# Patient Record
Sex: MALE | Race: WHITE | NOT HISPANIC OR LATINO | Employment: UNEMPLOYED | URBAN - METROPOLITAN AREA
[De-identification: names, ages, dates, MRNs, and addresses within clinical notes are randomized per-mention and may not be internally consistent; named-entity substitution may affect disease eponyms.]

---

## 2017-01-11 ENCOUNTER — OCCUPATIONAL MEDICINE (OUTPATIENT)
Dept: URGENT CARE | Facility: CLINIC | Age: 27
End: 2017-01-11
Payer: COMMERCIAL

## 2017-01-11 ENCOUNTER — APPOINTMENT (OUTPATIENT)
Dept: RADIOLOGY | Facility: IMAGING CENTER | Age: 27
End: 2017-01-11
Attending: FAMILY MEDICINE
Payer: COMMERCIAL

## 2017-01-11 VITALS
RESPIRATION RATE: 16 BRPM | TEMPERATURE: 97.5 F | SYSTOLIC BLOOD PRESSURE: 120 MMHG | DIASTOLIC BLOOD PRESSURE: 80 MMHG | HEART RATE: 82 BPM | OXYGEN SATURATION: 97 %

## 2017-01-11 DIAGNOSIS — S50.01XA CONTUSION OF RIGHT ELBOW, INITIAL ENCOUNTER: ICD-10-CM

## 2017-01-11 DIAGNOSIS — S63.501A WRIST SPRAIN, RIGHT, INITIAL ENCOUNTER: ICD-10-CM

## 2017-01-11 DIAGNOSIS — S93.401A SPRAIN OF RIGHT ANKLE, UNSPECIFIED LIGAMENT, INITIAL ENCOUNTER: ICD-10-CM
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
## 2017-01-11 LAB
AMP AMPHETAMINE: NORMAL
BREATH ALCOHOL COMMENT: NORMAL
COC COCAINE: NORMAL
INT CON NEG: NORMAL
INT CON POS: NORMAL
MET METHAMPHETAMINES: NORMAL
OPI OPIATES: NORMAL
PCP PHENCYCLIDINE: NORMAL
POC BREATHALIZER: 0 PERCENT (ref 0–0.01)
POC DRUG COMMENT 753798-OCCUPATIONAL HEALTH: NEGATIVE
THC: NORMAL

## 2017-01-11 PROCEDURE — 99214 OFFICE O/P EST MOD 30 MIN: CPT | Performed by: FAMILY MEDICINE

## 2017-01-11 PROCEDURE — 80305 DRUG TEST PRSMV DIR OPT OBS: CPT | Performed by: FAMILY MEDICINE

## 2017-01-11 PROCEDURE — 73080 X-RAY EXAM OF ELBOW: CPT | Mod: TC,RT | Performed by: FAMILY MEDICINE

## 2017-01-11 PROCEDURE — 82075 ASSAY OF BREATH ETHANOL: CPT | Performed by: FAMILY MEDICINE

## 2017-01-11 PROCEDURE — 73110 X-RAY EXAM OF WRIST: CPT | Mod: TC,RT | Performed by: FAMILY MEDICINE

## 2017-01-11 NOTE — Clinical Note
[address] [place] [address]  Phone: [phone] - Fax: [phone]        [facility] Progress Report and Disability Certification  Date of Service: 1/11/2017   No Show:  No  Date / Time of Next Visit: 1/13/17 @ 2:30   Claim Information   Patient Name: Eddie Daniel  Claim Number:     Employer:   [employer] Date of Injury: 1/11/2017     Insurer / TPA: Lars Cota  ID / SSN:     Occupation: Parts  Diagnosis: Diagnoses of Wrist sprain, right, initial encounter, Contusion of right elbow, initial encounter, and Sprain of right ankle, unspecified ligament, initial encounter were pertinent to this visit.    Medical Information   Related to Industrial Injury? Yes    Subjective Complaints:    DOI:   1/11    Fall   The fall occurred while getting out of his truck - slipped and fell.   . Pt fell from a height of 3 to 5 ft. Pt landed on hard ground. There was no blood loss. Point of impact:  Right outstretched hand, rt elbow and he rolled   his rt ankle.   Pain 4/10, constant, throbbing, worse to the touch over rt elbow, wrist and ankle.   Objective Findings: Musculoskeletal:   Right ankle - tender over lateral malleolus.   No bruising, minimal swelling.  Full ROM  Right wrist - tender over scaphoid.   Full ROM, no edema.  Neurovascularly intact  Right elbow - tender over olecranon process.   No bruising,   abrasions, or edema.    Pre-Existing Condition(s):     Assessment:   Initial Visit    Status: Additional Care Required    Permanent Disability:No    Plan: Medication    Diagnostics: X-ray  Comments:no fracture    Comments:       Disability Information   Status: Released to Restricted Duty    From:  1/11/2017  Through: 1/14/2017 Restrictions are: Temporary   Physical Restrictions   Sitting:    Standing:    Stooping:    Bending:  < or = to 2 hrs/day   Squatting:    Walking:  < or = to 2 hrs/day Climbing:    Pushing:      Pulling:   Other:    Reaching Above Shoulder (L):   Reaching Above Shoulder (R):       Reaching Below Shoulder (L):    Reaching Below Shoulder (R):      Not to exceed Weight Limits   Carrying(hrs):   Weight Limit(lb): < or = to 10 pounds Lifting(hrs):   Weight  Limit(lb): < or = to 10 pounds   Comments: 1. Wrist sprain, right, initial encounter  2. Contusion of right elbow, initial encounter  3. Sprain of right ankle, unspecified ligament, initial encounter       RICE protocol  xrays personally reviewed - no fracture.     Work restrictions per D39    F/u 3-5 d    Repetitive Actions   Hands: i.e. Fine Manipulations from Grasping:     Feet: i.e. Operating Foot Controls:     Driving / Operate Machinery:     Physician Name: Jake Diana Physician Signature: JAKE Thomas M.D. e-Signature: Dr. Brannon Strong, Medical Director   Clinic Name / Location: [address] [place] [address] Clinic Phone Number: Dept: [phone]   Appointment Time: 11:15 Am Visit Start Time:    Check-In Time:  11:29 Am Visit Discharge Time:  1:06pm   Original-Treating Physician or Chiropractor    Page 2-Insurer/TPA    Page 3-Employer    Page 4-Employee

## 2017-01-11 NOTE — MR AVS SNAPSHOT
Eddie Zhu Fredy   2017 11:15 AM   Occupational Medicine   MRN: [id number]    Department:  [facility]   Dept Phone:  [phone]    Description:  Male : 1990   Provider:  Jake Diana M.D.           Reason for Visit     Fall NEW WC pt fell getting out of truck, Righ ankle, right knee, right elbow, right wrist and right side of head, pt is dizzy       Allergies as of 2017     Allergen Noted Reactions    Hydrocodone-Acetaminophen 2014   Rash, Vomiting    Rash on neck and nausea/vomiting.      You were diagnosed with     Wrist sprain, right, initial encounter   [673555]       Contusion of right elbow, initial encounter   [509188]       Sprain of right ankle, unspecified ligament, initial encounter   [2245535]         Vital Signs     Blood Pressure Pulse Temperature Respirations Oxygen Saturation Smoking Status    120/80 mmHg 82 36.4 °C (97.5 °F) 16 97% Never Smoker       Basic Information     Date Of Birth Sex Race Ethnicity Preferred Language    1990 Male White Non- English      Your appointments     2017  2:30 PM   Workers Compensation with Arsenio Lebron D.O.   [facility] [address] [address] [phone]              Problem List              ICD-10-CM Priority Class Noted - Resolved    Attention to ileostomy ([facility]) Z43.2   4/3/2012 - Present    Enteritis K52.9   2012 - Present    Dehydration E86.0   2014 - Present    SBO (small bowel obstruction) ([facility]) K56.69   2014 - Present    Abdominal pain R10.9   2014 - Present    Internal derangement of knee M23.90   2014 - Present      Health Maintenance        Date Due Completion Dates    IMM DTaP/Tdap/Td Vaccine (1 - Tdap) 2007 ---    IMM INFLUENZA (1) 2016 ---            Results     POCT Breath Alcohol Test      Component Value Standard Range & Units    POC Breathalizer 0.000 0.00 - 0.01 Percent    Breath Alcohol  Comment                  POCT 6 Panel Urine Drug Screen      Component    AMPHETAMINE    POC THC    COCAINE    OPIATES    PHENCYCLIDINE    METHAMPHETAMINES    POC Urine Drug Screen Comment    Negative    Internal Control Positive    Valid    Internal Control Negative    Valid                        Current Immunizations     No immunizations on file.      Below and/or attached are the medications your provider expects you to take. Review all of your home medications and newly ordered medications with your provider and/or pharmacist. Follow medication instructions as directed by your provider and/or pharmacist. Please keep your medication list with you and share with your provider. Update the information when medications are discontinued, doses are changed, or new medications (including over-the-counter products) are added; and carry medication information at all times in the event of emergency situations     Allergies:  HYDROCODONE-ACETAMINOPHEN - Rash,Vomiting               Medications  Valid as of: January 11, 2017 -  1:22 PM    Generic Name Brand Name Tablet Size Instructions for use    Acetaminophen (Tab) TYLENOL 325 MG Take 650 mg by mouth every four hours as needed.        Ibuprofen (Tab) MOTRIN 600 MG Take 1 Tab by mouth every 6 hours as needed.        Oxycodone-Acetaminophen (Tab) PERCOCET 5-325 MG Take 1 Tab by mouth every four hours as needed.        Oxycodone-Acetaminophen (Tab) PERCOCET 5-325 MG Take 1-2 Tabs by mouth every 6 hours as needed (pain).        Oxycodone-Acetaminophen (Tab) PERCOCET 5-325 MG Take 1-2 Tabs by mouth every 6 hours as needed (pain).        .                 Medicines prescribed today were sent to:     [facility] #[id number] - [place] - [address] [place] [address]    Phone: [phone] Fax: [phone]    Open 24 Hours?: No      Medication refill instructions:       If your prescription bottle indicates you have medication refills left, it is not necessary to call  your provider’s office. Please contact your pharmacy and they will refill your medication.    If your prescription bottle indicates you do not have any refills left, you may request refills at any time through one of the following ways: The online V[employer] system (except Urgent Care), by calling your provider’s office, or by asking your pharmacy to contact your provider’s office with a refill request. Medication refills are processed only during regular business hours and may not be available until the next business day. Your provider may request additional information or to have a follow-up visit with you prior to refilling your medication.   *Please Note: Medication refills are assigned a new Rx number when refilled electronically. Your pharmacy may indicate that no refills were authorized even though a new prescription for the same medication is available at the pharmacy. Please request the medicine by name with the pharmacy before contacting your provider for a refill.        Your To Do List     Future Labs/Procedures Complete By Expires    DX-ELBOW-COMPLETE 3+ RIGHT  As directed 1/11/2018    DX-WRIST-COMPLETE 3+ RIGHT  As directed 7/11/2017         [employer] Access Code: [email or web address]  Expires: 1/25/2017  8:48 AM    [employer]  A secure, online tool to manage your health information     [employer] Vita Sound® is a secure, online tool that connects you to your personalized health information from the privacy of your home -- day or night - making it very easy for you to manage your healthcare. Once the activation process is completed, you can even access your medical information using the V[employer] Sound ivy, which is available for free in the Apple Ivy store or Google Play store.     [employer] provides the following levels of access (as shown below):   My Chart Features   [facility] Doctor [facility]  Specialists [facility]  Urgent  Care Non-[facility]  Primary Care  Doctor   Email your healthcare team securely and privately  24/7 X X X    Manage appointments: schedule your next appointment; view details of past/upcoming appointments X      Request prescription refills. X      View recent personal medical records, including lab and immunizations X X X X   View health record, including health history, allergies, medications X X X X   Read reports about your outpatient visits, procedures, consult and ER notes X X X X   See your discharge summary, which is a recap of your hospital and/or ER visit that includes your diagnosis, lab results, and care plan. X X       How to register for [facility]:  1. Go to  [email or web address].  2. Click on the Sign Up Now box, which takes you to the New Member Sign Up page. You will need to provide the following information:  a. Enter your [facility] Access Code exactly as it appears at the top of this page. (You will not need to use this code after you’ve completed the sign-up process. If you do not sign up before the expiration date, you must request a new code.)   b. Enter your date of birth.   c. Enter your home email address.   d. Click Submit, and follow the next screen’s instructions.  3. Create a [facility] ID. This will be your [facility] login ID and cannot be changed, so think of one that is secure and easy to remember.  4. Create a [facility] password. You can change your password at any time.  5. Enter your Password Reset Question and Answer. This can be used at a later time if you forget your password.   6. Enter your e-mail address. This allows you to receive e-mail notifications when new information is available in [facility].  7. Click Sign Up. You can now view your health information.    For assistance activating your [facility] account, call [phone]

## 2017-01-11 NOTE — Clinical Note
"EMPLOYEE’S CLAIM FOR COMPENSATION/ REPORT OF INITIAL TREATMENT  FORM C-4    EMPLOYEE’S CLAIM - PROVIDE ALL INFORMATION REQUESTED   First Name  Eddie Last Name  Chick Birthdate                    1990                Sex  male Claim Number   Home Address  [address] [place] [address] Age  26 y.o. 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 (if applicable)  Getting out my truck    How did this injury or occupational disease occur? (Be specific an answer in detail. Use additional sheet if necessary)  I was getting out my truck rolled my ankle and slipped on Ice, Falling on my right knee,   wrist, elbow, my head.   If you believe that you have an occupational disease, when did you first have knowledge of the disability and it relationship to your employment?  na Witnesses to the Accident  na      Nature of Injury or Occupational Disease  Sprain  Part(s) of Body Injured or Affected  Knee (R), Wrist (R) and Hand (R), Skull    I certify that the above is true and correct to the best of my knowledge and that I have provided this information in order to obtain the " benefits of [place]  I hereby authorize any physician, chiropractor, surgeon, practitioner, or other person, any hospital, including [facility] or [facility], any medical service organization, any insurance company, or other institution or organization to release to each other, any medical or other information, including benefits paid or payable, pertinent to this injury or disease, except information relative to diagnosis, treatment and/or counseling for AIDS, psychological conditions, alcohol or controlled substances, for which I must give specific authorization.  A Photostat of this authorization shall be as valid as the original.     Date   Place   Employee’s Signature   THIS REPORT MUST BE COMPLETED AND MAILED WITHIN 3 WORKING DAYS OF TREATMENT   Place  [facility]  Name of [facility]   Date  1/11/2017 Diagnosis  (S63.501A) Wrist sprain, right, initial encounter  (S50.01XA) Contusion of right elbow, initial encounter  (S93.401A) Sprain of right ankle, unspecified ligament, initial encounter Is there evidence the injured employee was under the influence of alcohol and/or another controlled substance at the time of accident?   Hour   Description of Injury or Disease  Diagnoses of Wrist sprain, right, initial encounter, Contusion of right elbow, initial encounter, and Sprain of right ankle, unspecified ligament, initial encounter were pertinent to this visit. No   Treatment  1. Wrist sprain, right, initial encounter  2. Contusion of right elbow, initial encounter  3. Sprain of right ankle, unspecified ligament, initial encounter       RICE protocol  xrays personally reviewed - no fracture.     Work restrictions per D39    F/u 3-5 d  Have you advised the patient to remain off work five days or more? No   X-Ray Findings  Negative   If Yes   From Date  To Date      From  "information given by the employee, together with medical evidence, can you directly connect this injury or occupational disease as job incurred?  Yes If No Full Duty  No Modified Duty  Yes   Is additional medical care by a physician indicated?  Yes If Modified Duty, Specify any Limitations / Restrictions  Restrictions per D39   Do you know of any previous injury or disease contributing to this condition or occupational disease?                            No   Date  1/11/2017 Print Doctor’s Name Jake Diana M.D. I certify the employer’s copy of  this form was mailed on:   Address  [address]s [address] Only     [place] Zip  [address]    Provider’s Tax ID Number  [id number]  Telephone  Dept: [phone]        e-JAKE Schrader M.D.   e-Signature: Dr. Brannon Strong, Medical Director Degree  MD        ORIGINAL-TREATING PHYSICIAN OR CHIROPRACTOR    PAGE 2-INSURER/TPA    PAGE 3-EMPLOYER    PAGE 4-EMPLOYEE             Form C-4 (rev10/07)              BRIEF DESCRIPTION OF RIGHTS AND BENEFITS  (Pursuant to [place] 616C.050)    Notice of Injury or Occupational Disease (Incident Report Form C-1): If an injury or occupational disease (OD) arises out of and in the  course of employment, you must provide written notice to your employer as soon as practicable, but no later than 7 days after the accident or  OD. Your employer shall maintain a sufficient supply of the required forms.    Claim for Compensation (Form C-4): If medical treatment is sought, the form C-4 is available at the place of initial treatment. A completed  \"Claim for Compensation\" (Form C-4) must be filed within 90 days after an accident or OD. The treating physician or chiropractor must,  within 3 working days after treatment, complete and mail to the employer, the employer's insurer and third-party , the Claim for  Compensation.    Medical Treatment: If you require medical treatment for your on-the-job injury " or OD, you may be required to select a physician or  chiropractor from a list provided by your workers’ compensation insurer, if it has contracted with an Organization for Managed Care (MCO) or  Preferred Provider Organization (PPO) or providers of health care. If your employer has not entered into a contract with an MCO or PPO, you  may select a physician or chiropractor from the Panel of Physicians and Chiropractors. Any medical costs related to your industrial injury or  OD will be paid by your insurer.    Temporary Total Disability (TTD): If your doctor has certified that you are unable to work for a period of at least 5 consecutive days, or 5  cumulative days in a 20-day period, or places restrictions on you that your employer does not accommodate, you may be entitled to TTD  compensation.    Temporary Partial Disability (TPD): If the wage you receive upon reemployment is less than the compensation for TTD to which you are  entitled, the insurer may be required to pay you TPD compensation to make up the difference. TPD can only be paid for a maximum of 24  months.    Permanent Partial Disability (PPD): When your medical condition is stable and there is an indication of a PPD as a result of your injury or  OD, within 30 days, your insurer must arrange for an evaluation by a rating physician or chiropractor to determine the degree of your PPD. The  amount of your PPD award depends on the date of injury, the results of the PPD evaluation and your age and wage.    Permanent Total Disability (PTD): If you are medically certified by a treating physician or chiropractor as permanently and totally disabled  and have been granted a PTD status by your insurer, you are entitled to receive monthly benefits not to exceed 66 2/3% of your average  monthly wage. The amount of your PTD payments is subject to reduction if you previously received a PPD award.    Vocational Rehabilitation Services: You may be eligible for  vocational rehabilitation services if you are unable to return to the job due to a  permanent physical impairment or permanent restrictions as a result of your injury or occupational disease.    Transportation and Per Johan Reimbursement: You may be eligible for travel expenses and per johan associated with medical treatment.    Reopening: You may be able to reopen your claim if your condition worsens after claim closure.    Appeal Process: If you disagree with a written determination issued by the insurer or the insurer does not respond to your request, you may  appeal to the Department of Administration, , by following the instructions contained in your determination letter. You must  appeal the determination within 70 days from the date of the determination letter at [address], [address], [place], [place]  [address], or [address], [address], [place], [place] [address]. If you disagree with the  decision, you may appeal to the  Department of Administration, . You must file your appeal within 30 days from the date of the  decision  letter at [address], [address], [place], [place] [address], or [address], [address], [place], [place] [address]. If you  disagree with a decision of an , you may file a petition for judicial review with the District Court. You must do so within 30  days of the Appeal Officer’s decision. You may be represented by an  at your own expense or you may contact the [employer] for possible  representation.    [place]  for Injured Workers (NAIW): If you disagree with a  decision, you may request that NAIW represent you  without charge at an  Hearing. For information regarding denial of benefits, you may contact the [employer] at: [address], [address], [place] [address], [phone], or [address], [place]  [address], [phone]    To File a Complaint with the Division: If you wish to file a complaint with the  of the Division of Industrial Relations (DIR),  please contact the Workers’ Compensation Section, [address], [address], [place], [place] [address], telephone (910) [phone], or  [address], [address], [place], [place] [address], telephone (821) [phone].    For assistance with Workers’ Compensation Issues: you may contact the Office of the Governor Consumer Health Assistance, [address] [place] [address], [address], [place], [place] [address], Toll Free 1-528.774.6774, Web site: http://Needishcha.[facility].[email or web address].us, E-mail  [email or web address].                                                                                                                                                                                                                                   __________________________________________________________________                                                                   _________________                Employee Name / Signature                                                                                                                                                       Date                                                                                                                                                                                                     D-2 (rev. 10/07)

## 2017-01-13 ENCOUNTER — OCCUPATIONAL MEDICINE (OUTPATIENT)
Dept: OCCUPATIONAL MEDICINE | Facility: CLINIC | Age: 27
End: 2017-01-13
Payer: COMMERCIAL

## 2017-01-13 VITALS
RESPIRATION RATE: 16 BRPM | DIASTOLIC BLOOD PRESSURE: 82 MMHG | OXYGEN SATURATION: 94 % | HEART RATE: 90 BPM | HEIGHT: 69 IN | TEMPERATURE: 98.5 F | SYSTOLIC BLOOD PRESSURE: 118 MMHG

## 2017-01-13 DIAGNOSIS — S63.501D RIGHT WRIST SPRAIN, SUBSEQUENT ENCOUNTER: ICD-10-CM

## 2017-01-13 DIAGNOSIS — S50.01XD CONTUSION OF RIGHT ELBOW, SUBSEQUENT ENCOUNTER: ICD-10-CM

## 2017-01-13 PROCEDURE — 99213 OFFICE O/P EST LOW 20 MIN: CPT | Performed by: PREVENTIVE MEDICINE

## 2017-01-13 RX ORDER — IBUPROFEN 200 MG
200 TABLET ORAL EVERY 6 HOURS PRN
COMMUNITY
End: 2018-11-09

## 2017-01-13 NOTE — MR AVS SNAPSHOT
"        Eddie Zhu Fredy   2017 2:30 PM   Occupational Medicine   MRN: [id number]    Department:  [facility]   Dept Phone:  [phone]    Description:  Male : 1990   Provider:  Arsenio Lebron D.O.           Reason for Visit     Follow-Up procedure room #1 - (R) ankle, knee, head, arm, DOI 17 improving, but arm is still sore      Allergies as of 2017     Allergen Noted Reactions    Hydrocodone-Acetaminophen 2014   Rash, Vomiting    Rash on neck and nausea/vomiting.      You were diagnosed with     Contusion of right elbow, subsequent encounter   [625677]       Right wrist sprain, subsequent encounter   [[id number]]         Vital Signs     Blood Pressure Pulse Temperature Respirations Height Oxygen Saturation    118/82 mmHg 90 36.9 °C (98.5 °F) 16 1.753 m (5' 9\") 94%    Smoking Status                   Never Smoker            Basic Information     Date Of Birth Sex Race Ethnicity Preferred Language    1990 Male White Non- English      Problem List              ICD-10-CM Priority Class Noted - Resolved    Attention to ileostomy (CMS-HCC) Z43.2   4/3/2012 - Present    Enteritis K52.9   2012 - Present    Dehydration E86.0   2014 - Present    SBO (small bowel obstruction) (CMS-HCC) K56.69   2014 - Present    Abdominal pain R10.9   2014 - Present    Internal derangement of knee M23.90   2014 - Present      Health Maintenance        Date Due Completion Dates    IMM DTaP/Tdap/Td Vaccine (1 - Tdap) 2007 ---    IMM INFLUENZA (1) 2016 ---            Current Immunizations     No immunizations on file.      Below and/or attached are the medications your provider expects you to take. Review all of your home medications and newly ordered medications with your provider and/or pharmacist. Follow medication instructions as directed by your provider and/or pharmacist. Please keep your medication list with you and share with your provider. Update the " information when medications are discontinued, doses are changed, or new medications (including over-the-counter products) are added; and carry medication information at all times in the event of emergency situations     Allergies:  HYDROCODONE-ACETAMINOPHEN - Rash,Vomiting               Medications  Valid as of: January 13, 2017 -  3:54 PM    Generic Name Brand Name Tablet Size Instructions for use    Acetaminophen (Tab) TYLENOL 325 MG Take 650 mg by mouth every four hours as needed.        Ibuprofen (Tab) MOTRIN 600 MG Take 1 Tab by mouth every 6 hours as needed.        Ibuprofen (Tab) MOTRIN 200 MG Take 200 mg by mouth every 6 hours as needed.        Oxycodone-Acetaminophen (Tab) PERCOCET 5-325 MG Take 1 Tab by mouth every four hours as needed.        Oxycodone-Acetaminophen (Tab) PERCOCET 5-325 MG Take 1-2 Tabs by mouth every 6 hours as needed (pain).        Oxycodone-Acetaminophen (Tab) PERCOCET 5-325 MG Take 1-2 Tabs by mouth every 6 hours as needed (pain).        .                 Medicines prescribed today were sent to:     [facility]Y #[id number] - [place] - [address] [address]    Phone: [phone] Fax: [phone]    Open 24 Hours?: No      Medication refill instructions:       If your prescription bottle indicates you have medication refills left, it is not necessary to call your provider’s office. Please contact your pharmacy and they will refill your medication.    If your prescription bottle indicates you do not have any refills left, you may request refills at any time through one of the following ways: The online Wipster system (except Urgent Care), by calling your provider’s office, or by asking your pharmacy to contact your provider’s office with a refill request. Medication refills are processed only during regular business hours and may not be available until the next business day. Your provider may request additional information or to have a follow-up visit with you prior  to refilling your medication.   *Please Note: Medication refills are assigned a new Rx number when refilled electronically. Your pharmacy may indicate that no refills were authorized even though a new prescription for the same medication is available at the pharmacy. Please request the medicine by name with the pharmacy before contacting your provider for a refill.           Preen.Me Access Code: [email or web address]  Expires: 1/25/2017  8:48 AM    Preen.Me  A secure, online tool to manage your health information     [employer]’s Preen.Me® is a secure, online tool that connects you to your personalized health information from the privacy of your home -- day or night - making it very easy for you to manage your healthcare. Once the activation process is completed, you can even access your medical information using the Preen.Me ivy, which is available for free in the Apple Ivy store or Google Play store.     Preen.Me provides the following levels of access (as shown below):   My Chart Features   [facility] Primary Care Doctor [facility]  Specialists [facility]  Urgent  Care Non-[facility]  Primary Care  Doctor   Email your healthcare team securely and privately 24/7 X X X    Manage appointments: schedule your next appointment; view details of past/upcoming appointments X      Request prescription refills. X      View recent personal medical records, including lab and immunizations X X X X   View health record, including health history, allergies, medications X X X X   Read reports about your outpatient visits, procedures, consult and ER notes X X X X   See your discharge summary, which is a recap of your hospital and/or ER visit that includes your diagnosis, lab results, and care plan. X X       How to register for Preen.Me:  1. Go to  [email or web address].  2. Click on the Sign Up Now box, which takes you to the New Member Sign Up page. You will need to provide the following information:  a. Enter your Preen.Me Access Code exactly as  it appears at the top of this page. (You will not need to use this code after you’ve completed the sign-up process. If you do not sign up before the expiration date, you must request a new code.)   b. Enter your date of birth.   c. Enter your home email address.   d. Click Submit, and follow the next screen’s instructions.  3. Create a CubeTree ID. This will be your CubeTree login ID and cannot be changed, so think of one that is secure and easy to remember.  4. Create a CubeTree password. You can change your password at any time.  5. Enter your Password Reset Question and Answer. This can be used at a later time if you forget your password.   6. Enter your e-mail address. This allows you to receive e-mail notifications when new information is available in CubeTree.  7. Click Sign Up. You can now view your health information.    For assistance activating your CubeTree account, call [phone]
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
## 2017-01-13 NOTE — Clinical Note
[address],   [address] [place] [address]  Phone: [phone] - Fax: [phone]        [facility] Progress Report and Disability Certification  Date of Service: 1/13/2017   No Show:  No  Date / Time of Next Visit:  Release from care   Claim Information   Patient Name: Eddie Daniel  Claim Number:     Employer:  [employer]  Date of Injury: 1/11/2017     Insurer / TPA: Lars Cota  ID / SSN:     Occupation: Parts  Diagnosis: Diagnoses of Contusion of right elbow, subsequent encounter and Right wrist sprain, subsequent encounter were pertinent to this visit.    Medical Information   Related to Industrial Injury? Yes    Subjective Complaints:  DOI 1/11/2017: Patient was getting out of truck and slipped on ice and fell onto ground landing on right outstretched hand, right elbow and he rolled his right side. Seen in UC, XR of right elbow and wrist were normal. Patient states that his pain is   mostly improved he still has some tenderness at the medial elbow and dorsal wrist but otherwise feels improved. He takes Advil over-the-counter for relief which is been helping. He states he feels that he can do his normal job at this point.   Objective Findings: Right elbow: Tenderness to palpation medial epicondyles full range of motion without pain or difficulty. Strength intact  Right wrist/hand: Tenderness to palpation over radial aspect of wrist. Full range of motion with minimal difficulty. Strength in  tact   Pre-Existing Condition(s):     Assessment:   Condition Improved    Status: Discharged /  MMI    Permanent Disability:No    Plan:      Diagnostics:      Comments:  Continue OTC Advil as needed  Release to full duty  Released from care  Expect complete resolution of symptoms in 1-2 weeks, if symptoms worsen or do not improve return to clinic    Disability Information   Status: Released to Full Duty    From:  1/13/2017  Through:    Restrictions are:     Physical Restrictions   Sitting:    Standing:    Stooping:    Bending:      Squatting:    Walking:    Climbing:    Pushing:      Pulling:    Other:    Reaching Above Shoulder (L):   Reaching Above Shoulder (R):       Reaching Below Shoulder (L):    Reaching Below Shoulder (R):      Not to exceed Weight Limits   Carrying(hrs):   Weight Limit(lb):   Lifting(hrs):   Weight  Limit(lb):     Comments:      Repetitive Actions   Hands: i.e. Fine Manipulations from Grasping:     Feet: i.e. Operating Foot Controls:     Driving / Operate Machinery:     Physician Name: Arsenio Lebron Physician Signature: ARSENIO Shearer D.O. e-Signature: Dr. Brannon Strong, Medical Director   Clinic Name / Location: [address],   [address] [place] [id number] Clinic Phone Number: Dept: [phone]   Appointment Time: 2:30 Pm Visit Start Time: 2:37 PM   Check-In Time:  2:26 Pm Visit Discharge Time:  @3:06PM   Original-Treating Physician or Chiropractor    Page 2-Insurer/TPA    Page 3-Employer    Page 4-Employee

## 2017-04-15 ENCOUNTER — APPOINTMENT (OUTPATIENT)
Dept: RADIOLOGY | Facility: MEDICAL CENTER | Age: 27
End: 2017-04-15
Attending: EMERGENCY MEDICINE
Payer: MEDICARE

## 2017-04-15 ENCOUNTER — HOSPITAL ENCOUNTER (EMERGENCY)
Facility: MEDICAL CENTER | Age: 27
End: 2017-04-15
Attending: EMERGENCY MEDICINE
Payer: MEDICARE

## 2017-04-15 VITALS
TEMPERATURE: 97.9 F | DIASTOLIC BLOOD PRESSURE: 68 MMHG | BODY MASS INDEX: 27.62 KG/M2 | RESPIRATION RATE: 20 BRPM | HEART RATE: 66 BPM | SYSTOLIC BLOOD PRESSURE: 122 MMHG | OXYGEN SATURATION: 100 % | WEIGHT: 186.51 LBS | HEIGHT: 69 IN

## 2017-04-15 DIAGNOSIS — K92.2 GASTROINTESTINAL HEMORRHAGE, UNSPECIFIED GASTROINTESTINAL HEMORRHAGE TYPE: ICD-10-CM

## 2017-04-15 LAB
ABO GROUP BLD: NORMAL
ALBUMIN SERPL BCP-MCNC: 4.5 G/DL (ref 3.2–4.9)
ALBUMIN/GLOB SERPL: 1.4 G/DL
ALP SERPL-CCNC: 106 U/L (ref 30–99)
ALT SERPL-CCNC: 22 U/L (ref 2–50)
ANION GAP SERPL CALC-SCNC: 11 MMOL/L (ref 0–11.9)
APTT PPP: 26.4 SEC (ref 24.7–36)
AST SERPL-CCNC: 17 U/L (ref 12–45)
BASOPHILS # BLD AUTO: 0.7 % (ref 0–1.8)
BASOPHILS # BLD: 0.05 K/UL (ref 0–0.12)
BILIRUB SERPL-MCNC: 0.6 MG/DL (ref 0.1–1.5)
BLD GP AB SCN SERPL QL: NORMAL
BUN SERPL-MCNC: 14 MG/DL (ref 8–22)
CALCIUM SERPL-MCNC: 9.8 MG/DL (ref 8.5–10.5)
CHLORIDE SERPL-SCNC: 103 MMOL/L (ref 96–112)
CO2 SERPL-SCNC: 24 MMOL/L (ref 20–33)
CREAT SERPL-MCNC: 0.99 MG/DL (ref 0.5–1.4)
EOSINOPHIL # BLD AUTO: 0.13 K/UL (ref 0–0.51)
EOSINOPHIL NFR BLD: 1.8 % (ref 0–6.9)
ERYTHROCYTE [DISTWIDTH] IN BLOOD BY AUTOMATED COUNT: 40.6 FL (ref 35.9–50)
GFR SERPL CREATININE-BSD FRML MDRD: >60 ML/MIN/1.73 M 2
GLOBULIN SER CALC-MCNC: 3.3 G/DL (ref 1.9–3.5)
GLUCOSE SERPL-MCNC: 79 MG/DL (ref 65–99)
HCT VFR BLD AUTO: 46.9 % (ref 42–52)
HGB BLD-MCNC: 16.1 G/DL (ref 14–18)
IMM GRANULOCYTES # BLD AUTO: 0.04 K/UL (ref 0–0.11)
IMM GRANULOCYTES NFR BLD AUTO: 0.5 % (ref 0–0.9)
INR PPP: 0.95 (ref 0.87–1.13)
LIPASE SERPL-CCNC: 31 U/L (ref 11–82)
LYMPHOCYTES # BLD AUTO: 1.53 K/UL (ref 1–4.8)
LYMPHOCYTES NFR BLD: 20.9 % (ref 22–41)
MCH RBC QN AUTO: 29.9 PG (ref 27–33)
MCHC RBC AUTO-ENTMCNC: 34.3 G/DL (ref 33.7–35.3)
MCV RBC AUTO: 87.2 FL (ref 81.4–97.8)
MONOCYTES # BLD AUTO: 0.5 K/UL (ref 0–0.85)
MONOCYTES NFR BLD AUTO: 6.8 % (ref 0–13.4)
NEUTROPHILS # BLD AUTO: 5.07 K/UL (ref 1.82–7.42)
NEUTROPHILS NFR BLD: 69.3 % (ref 44–72)
NRBC # BLD AUTO: 0 K/UL
NRBC BLD AUTO-RTO: 0 /100 WBC
PLATELET # BLD AUTO: 337 K/UL (ref 164–446)
PMV BLD AUTO: 8.9 FL (ref 9–12.9)
POTASSIUM SERPL-SCNC: 4.2 MMOL/L (ref 3.6–5.5)
PROT SERPL-MCNC: 7.8 G/DL (ref 6–8.2)
PROTHROMBIN TIME: 13 SEC (ref 12–14.6)
RBC # BLD AUTO: 5.38 M/UL (ref 4.7–6.1)
RH BLD: NORMAL
SODIUM SERPL-SCNC: 138 MMOL/L (ref 135–145)
WBC # BLD AUTO: 7.3 K/UL (ref 4.8–10.8)

## 2017-04-15 PROCEDURE — 36415 COLL VENOUS BLD VENIPUNCTURE: CPT

## 2017-04-15 PROCEDURE — 85730 THROMBOPLASTIN TIME PARTIAL: CPT

## 2017-04-15 PROCEDURE — 80053 COMPREHEN METABOLIC PANEL: CPT

## 2017-04-15 PROCEDURE — 71010 DX-CHEST-LIMITED (1 VIEW): CPT

## 2017-04-15 PROCEDURE — 86900 BLOOD TYPING SEROLOGIC ABO: CPT

## 2017-04-15 PROCEDURE — 85610 PROTHROMBIN TIME: CPT

## 2017-04-15 PROCEDURE — 86850 RBC ANTIBODY SCREEN: CPT

## 2017-04-15 PROCEDURE — 85025 COMPLETE CBC W/AUTO DIFF WBC: CPT

## 2017-04-15 PROCEDURE — 86901 BLOOD TYPING SEROLOGIC RH(D): CPT

## 2017-04-15 PROCEDURE — 99284 EMERGENCY DEPT VISIT MOD MDM: CPT

## 2017-04-15 PROCEDURE — 82272 OCCULT BLD FECES 1-3 TESTS: CPT

## 2017-04-15 PROCEDURE — 83690 ASSAY OF LIPASE: CPT

## 2017-04-15 NOTE — ED AVS SNAPSHOT
Home Care Instructions                                                                                                                Eddie Daniel   MRN: [id number]    Department:  [facility], Emergency Dept   Date of Visit:  4/15/2017            [facility], Emergency Dept    [address]    [place] [address]    Phone:  [phone]      You were seen by     Tay Saenz M.D.      Your Diagnosis Was     Gastrointestinal hemorrhage, unspecified gastrointestinal hemorrhage type     K92.2       Follow-up Information     1. Schedule an appointment as soon as possible for a visit with [facility].    Contact information    [address]  [place]  [phone]          2. Schedule an appointment as soon as possible for a visit with [facility].    Contact information    [facility] [address]  [phone]        Medication Information     Review all of your home medications and newly ordered medications with your primary doctor and/or pharmacist as soon as possible. Follow medication instructions as directed by your doctor and/or pharmacist.     Please keep your complete medication list with you and share with your physician. Update the information when medications are discontinued, doses are changed, or new medications (including over-the-counter products) are added; and carry medication information at all times in the event of emergency situations.               Medication List      ASK your doctor about these medications        Instructions    Morning Afternoon Evening Bedtime    acetaminophen 325 MG Tabs   Commonly known as:  TYLENOL        Take 650 mg by mouth every four hours as needed.   Dose:  650 mg                        * ADVIL 200 MG Tabs   Generic drug:  ibuprofen        Take 200 mg by mouth every 6 hours as needed.   Dose:  200 mg                        * ibuprofen 600 MG Tabs   Commonly known as:  MOTRIN        Take 1 Tab by mouth  "every 6 hours as needed.   Dose:  600 mg                        * oxycodone-acetaminophen 5-325 MG Tabs   Commonly known as:  PERCOCET        Take 1 Tab by mouth every four hours as needed.   Dose:  1 Tab                        * oxycodone-acetaminophen 5-325 MG Tabs   Commonly known as:  PERCOCET        Take 1-2 Tabs by mouth every 6 hours as needed (pain).   Dose:  1-2 Tab                        * oxycodone-acetaminophen 5-325 MG Tabs   Commonly known as:  PERCOCET        Take 1-2 Tabs by mouth every 6 hours as needed (pain).   Dose:  1-2 Tab                        * Notice:  This list has 5 medication(s) that are the same as other medications prescribed for you. Read the directions carefully, and ask your doctor or other care provider to review them with you.            Procedures and tests performed during your visit     APTT    CARDIAC MONITORING    CBC WITH DIFFERENTIAL    COD (ADULT)    COMP METABOLIC PANEL    DX-CHEST-LIMITED (1 VIEW)    ESTIMATED GFR    LIPASE    O2 Protocol    PROTHROMBIN TIME    SALINE LOCK        Discharge Instructions       Bloody Stools  Bloody stools often mean that there is a problem in the digestive tract. Your caregiver may use the term \"melena\" to describe black, tarry, and bad smelling stools or \"hematochezia\" to describe red or maroon-colored stools. Blood seen in the stool can be caused by bleeding anywhere along the intestinal tract.   A black stool usually means that blood is coming from the upper part of the gastrointestinal tract (esophagus, stomach, or small bowel). Passing maroon-colored stools or bright red blood usually means that blood is coming from lower down in the large bowel or the rectum. However, sometimes massive bleeding in the stomach or small intestine can cause bright red bloody stools.   Consuming black licorice, lead, iron pills, medicines containing bismuth subsalicylate, or blueberries can also cause black stools. Your caregiver can test black stools to " see if blood is present.  It is important that the cause of the bleeding be found. Treatment can then be started, and the problem can be corrected. Rectal bleeding may not be serious, but you should not assume everything is okay until you know the cause. It is very important to follow up with your caregiver or a specialist in gastrointestinal problems.  CAUSES   Blood in the stools can come from various underlying causes. Often, the cause is not found during your first visit. Testing is often needed to discover the cause of bleeding in the gastrointestinal tract. Causes range from simple to serious or even life-threatening. Possible causes include:  · Hemorrhoids. These are veins that are full of blood (engorged) in the rectum. They cause pain, inflammation, and may bleed.  · Anal fissures. These are areas of painful tearing which may bleed. They are often caused by passing hard stool.  · Diverticulosis. These are pouches that form on the colon over time, with age, and may bleed significantly.  · Diverticulitis. This is inflammation in areas with diverticulosis. It can cause pain, fever, and bloody stools, although bleeding is rare.  · Proctitis and colitis. These are inflamed areas of the rectum or colon. They may cause pain, fever, and bloody stools.  · Polyps and cancer. Colon cancer is a leading cause of preventable cancer death. It often starts out as precancerous polyps that can be removed during a colonoscopy, preventing progression into cancer. Sometimes, polyps and cancer may cause rectal bleeding.  · Gastritis and ulcers. Bleeding from the upper gastrointestinal tract (near the stomach) may travel through the intestines and produce black, sometimes tarry, often bad smelling stools. In certain cases, if the bleeding is fast enough, the stools may not be black, but red and the condition may be life-threatening.  SYMPTOMS   You may have stools that are bright red and bloody, that are normal color with blood  on them, or that are dark black and tarry. In some cases, you may only have blood in the toilet bowl. Any of these cases need medical care. You may also have:  · Pain at the anus or anywhere in the rectum.  · Lightheadedness or feeling faint.  · Extreme weakness.  · Nausea or vomiting.  · Fever.  DIAGNOSIS  Your caregiver may use the following methods to find the cause of your bleeding:  · Taking a medical history. Age is important. Older people tend to develop polyps and cancer more often. If there is anal pain and a hard, large stool associated with bleeding, a tear of the anus may be the cause. If blood drips into the toilet after a bowel movement, bleeding hemorrhoids may be the problem. The color and frequency of the bleeding are additional considerations. In most cases, the medical history provides clues, but seldom the final answer.  · A visual and finger (digital) exam. Your caregiver will inspect the anal area, looking for tears and hemorrhoids. A finger exam can provide information when there is tenderness or a growth inside. In men, the prostate is also examined.  · Endoscopy. Several types of small, long scopes (endoscopes) are used to view the colon.  · In the office, your caregiver may use a rigid, or more commonly, a flexible viewing sigmoidoscope. This exam is called flexible sigmoidoscopy. It is performed in 5 to 10 minutes.  · A more thorough exam is accomplished with a colonoscope. It allows your caregiver to view the entire 5 to 6 foot long colon. Medicine to help you relax (sedative) is usually given for this exam. Frequently, a bleeding lesion may be present beyond the reach of the sigmoidoscope. So, a colonoscopy may be the best exam to start with. Both exams are usually done on an outpatient basis. This means the patient does not stay overnight in the hospital or surgery center.  · An upper endoscopy may be needed to examine your stomach. Sedation is used and a flexible endoscope is put in  your mouth, down to your stomach.  · A barium enema X-ray. This is an X-ray exam. It uses liquid barium inserted by enema into the rectum. This test alone may not identify an actual bleeding point. X-rays highlight abnormal shadows, such as those made by lumps (tumors), diverticuli, or colitis.  TREATMENT   Treatment depends on the cause of your bleeding.   · For bleeding from the stomach or colon, the caregiver doing your endoscopy or colonoscopy may be able to stop the bleeding as part of the procedure.  · Inflammation or infection of the colon can be treated with medicines.  · Many rectal problems can be treated with creams, suppositories, or warm baths.  · Surgery is sometimes needed.  · Blood transfusions are sometimes needed if you have lost a lot of blood.  · For any bleeding problem, let your caregiver know if you take aspirin or other blood thinners regularly.  HOME CARE INSTRUCTIONS   · Take any medicines exactly as prescribed.  · Keep your stools soft by eating a diet high in fiber. Prunes (1 to 3 a day) work well for many people.  · Drink enough water and fluids to keep your urine clear or pale yellow.  · Take sitz baths if advised. A sitz bath is when you sit in a bathtub with warm water for 10 to 15 minutes to soak, soothe, and cleanse the rectal area.  · If enemas or suppositories are advised, be sure you know how to use them. Tell your caregiver if you have problems with this.  · Monitor your bowel movements to look for signs of improvement or worsening.  SEEK MEDICAL CARE IF:   · You do not improve in the time expected.  · Your condition worsens after initial improvement.  · You develop any new symptoms.  SEEK IMMEDIATE MEDICAL CARE IF:   · You develop severe or prolonged rectal bleeding.  · You vomit blood.  · You feel weak or faint.  · You have a fever.  MAKE SURE YOU:  · Understand these instructions.  · Will watch your condition.  · Will get help right away if you are not doing well or get  worse.  Document Released: 12/08/2003 Document Revised: 03/11/2013 Document Reviewed: 05/04/2012  [employer]® Patient Information ©2014 [employer].            Patient Information     Patient Information    Following emergency treatment: all patient requiring follow-up care must return either to a private physician or a clinic if your condition worsens before you are able to obtain further medical attention, please return to the emergency room.     Billing Information    At [facility], we work to make the billing process streamlined for our patients.  Our Representatives are here to answer any questions you may have regarding your hospital bill.  If you have insurance coverage and have supplied your insurance information to us, we will submit a claim to your insurer on your behalf.  Should you have any questions regarding your bill, we can be reached online or by phone as follows:  Online: You are able pay your bills online or live chat with our representatives about any billing questions you may have. We are here to help Monday - Friday from 8:00am to 7:30pm and 9:00am - 12:00pm on Saturdays.  Please visit [email or web address]  for more information.   Phone:  [phone] or [phone]    Please note that your emergency physician, surgeon, pathologist, radiologist, anesthesiologist, and other specialists are not employed by [facility] and will therefore bill separately for their services.  Please contact them directly for any questions concerning their bills at the numbers below:     Emergency Physician Services:  [phone]  [facility]:  [phone]  [facility]:  [phone]  [facility]:  [phone]    1. Your final bill may vary from the amount quoted upon discharge if all procedures are not complete at that time, or if your doctor has additional procedures of which we are not aware. You will receive an additional bill  if you return to the Emergency Department at [facility] for suture removal regardless of the facility of which the sutures were placed.     2. Please arrange for settlement of this account at the emergency registration.    3. All self-pay accounts are due in full at the time of treatment.  If you are unable to meet this obligation then payment is expected within 4-5 days.     4. If you have had radiology studies (CT, X-ray, Ultrasound, MRI), you have received a preliminary result during your emergency department visit. Please contact the radiology department [phone] to receive a copy of your final result. Please discuss the Final result with your primary physician or with the follow up physician provided.     Crisis Hotline:  [place] Crisis Hotline:  8-676-IUYIRSL or 1-601.998.7577  [place] Crisis Hotline:    1-845.920.2968 or 196-331-2671         ED Discharge Follow Up Questions    1. In order to provide you with very good care, we would like to follow up with a phone call in the next few days.  May we have your permission to contact you?     YES /  NO    2. What is the best phone number to call you? (       )_____-__________    3. What is the best time to call you?      Morning  /  Afternoon  /  Evening                   Patient Signature:  ____________________________________________________________    Date:  ____________________________________________________________

## 2017-04-15 NOTE — ED AVS SNAPSHOT
4/15/2017    Eddei Zhu Chick  [address] [place] [address]    Dear Eddie:    [facility] wants to ensure your discharge home is safe and you or your loved ones have had all of your questions answered regarding your care after you leave the hospital.    Below is a list of resources and contact information should you have any questions regarding your hospital stay, follow-up instructions, or active medical symptoms.    Questions or Concerns Regarding… Contact   Medical Questions Related to Your Discharge  (7 days a week, 8am-5pm) Contact a Nurse Care Coordinator   [phone]   Medical Questions Not Related to Your Discharge  (24 hours a day / 7 days a week)  Contact the Nurse Health Line   [phone]    Medications or Discharge Instructions Refer to your discharge packet   or contact your [facility] Primary Care Provider   [phone]   Follow-up Appointment(s) Schedule your appointment via [employer]   or contact Scheduling [phone]   Billing Review your statement via [employer]  or contact Billing [phone]   Medical Records Review your records via [employer]   or contact Medical Records [phone]     You may receive a telephone call within two days of discharge. This call is to make certain you understand your discharge instructions and have the opportunity to have any questions answered. You can also easily access your medical information, test results and upcoming appointments via the [employer] free online health management tool. You can learn more and sign up at [employer]. For assistance setting up your [employer] account, please call [phone].    Once again, we want to ensure your discharge home is safe and that you have a clear understanding of any next steps in your care. If you have any questions or concerns, please do not hesitate to contact us, we are here for you. Thank you for choosing [facility] for your healthcare needs.    Sincerely,    Your [facility] Healthcare Team

## 2017-04-15 NOTE — ED NOTES
Pt ambulatory to triage c/o both bright red blood and black blood in stool for over the past month. Pt states today he has also noted some facial numbness to lower face and felt dizzy. Pt states hx of colon cancer in past. nad

## 2017-04-15 NOTE — ED AVS SNAPSHOT
[employer] Access Code: [email or web address]  Expires: 5/15/2017  6:44 PM    [employer]  A secure, online tool to manage your health information     [employer]® is a secure, online tool that connects you to your personalized health information from the privacy of your home -- day or night - making it very easy for you to manage your healthcare. Once the activation process is completed, you can even access your medical information using the [employer] ivy, which is available for free in the [employer] Ivy store or Google Play store.     [employer] provides the following levels of access (as shown below):   My Chart Features   [facility] Primary Care Doctor [facility]  Specialists [facility]  Urgent  Care Non-[facility]  Primary Care  Doctor   Email your healthcare team securely and privately 24/7 X X X X   Manage appointments: schedule your next appointment; view details of past/upcoming appointments X      Request prescription refills. X      View recent personal medical records, including lab and immunizations X X X X   View health record, including health history, allergies, medications X X X X   Read reports about your outpatient visits, procedures, consult and ER notes X X X X   See your discharge summary, which is a recap of your hospital and/or ER visit that includes your diagnosis, lab results, and care plan. X X       How to register for [employer]:  1. Go to  [email or web address].  2. Click on the Sign Up Now box, which takes you to the New Member Sign Up page. You will need to provide the following information:  a. Enter your [employer] Access Code exactly as it appears at the top of this page. (You will not need to use this code after you’ve completed the sign-up process. If you do not sign up before the expiration date, you must request a new code.)   b. Enter your date of birth.   c. Enter your home email address.   d. Click Submit, and follow the next screen’s instructions.  3. Create a [employer] ID. This will be your [employer]  login ID and cannot be changed, so think of one that is secure and easy to remember.  4. Create a [employer] password. You can change your password at any time.  5. Enter your Password Reset Question and Answer. This can be used at a later time if you forget your password.   6. Enter your e-mail address. This allows you to receive e-mail notifications when new information is available in [employer].  7. Click Sign Up. You can now view your health information.    For assistance activating your [employer] account, call [phone]

## 2017-04-16 NOTE — ED PROVIDER NOTES
ED Provider Note    Scribed for Tay Saenz M.D. by Sharyn Farfan. 4/15/2017, 5:55 PM.    Primary care provider: Pcp Pt States None  Means of arrival: walk-in  History obtained from: Patient  History limited by: none    CHIEF COMPLAINT  Chief Complaint   Patient presents with   • Bloody Stools       [facility]  Eddie Daniel is a 26 y.o. Male with a family and individual history of colon cancer(familial paliposis) who presents to the Emergency Department for bright red as well as black colored stools, present for the last month with associated lightheadedness, dizziness, and facial numbness. This has been steadily worsening over the last month due to what the patient believes is stress from home life. Since his GI surgeries 6 years ago the patient has experienced intermittent blood on the toilet paper 1-2x a month. Patient is not supposed to be on any daily medications. He denies alcohol, tobacco, or drug use. Patient is describing pain overlying his colostomy bag.  Patient's GI surgery performed 6 years ago was completed by an unknown retired physician and he has not been following up with any specialists since then due to monetary reasons, nor does he have a Primary Care. He is supposed to be consulting with [facility] at this time.  Denies any history of hemorrhoids.    REVIEW OF SYSTEMS  See HPI for further details. All other systems are negative.     PAST MEDICAL HISTORY   has a past medical history of Colon polyps; Colon cancer (CMS-HCC) (2011); MRSA (methicillin resistant Staphylococcus aureus) (05/2011); Indigestion; Cancer (CMS-HCC) (2011); Other specified disorder of intestines; Dental disorder; Snoring; Heart burn; and Pain.    SURGICAL HISTORY   has past surgical history that includes repair bladder wound/inj,complic (2007); arthroscopy, knee (2/28/2014); ileostomy (3/8/2011); colectomy (3/8/2011); ileostomy (5/3/2011); exploratory laparotomy (5/23/2011); ileostomy (5/23/2011); ileostomy  "(4/3/2012); knee arthrotomy (2007); knee arthroscopy (11/6/2014); and open osteochondral allograft (11/6/2014).    SOCIAL HISTORY  Social History   Substance Use Topics   • Smoking status: Never Smoker    • Alcohol Use: No      History   Drug Use No       FAMILY HISTORY  Family History   Problem Relation Age of Onset   • Cancer         CURRENT MEDICATIONS  Reviewed.  See Encounter Summary.     ALLERGIES  Allergies   Allergen Reactions   • Hydrocodone-Acetaminophen Rash and Vomiting     Rash on neck and nausea/vomiting.       PHYSICAL EXAM  VITAL SIGNS: /68 mmHg  Pulse 66  Temp(Src) 36.6 °C (97.9 °F) (Temporal)  Resp 20  Ht 1.753 m (5' 9\")  Wt 84.6 kg (186 lb 8.2 oz)  BMI 27.53 kg/m2  SpO2 100%    Constitutional: Alert in no apparent distress.  HENT: No signs of trauma, Bilateral external ears normal, Nose normal.   Eyes: Pupils are equal and reactive, Conjunctiva normal, Non-icteric.   Neck: Normal range of motion, No tenderness, Supple, No stridor.   Lymphatic: No lymphadenopathy noted.   Cardiovascular: Regular rate and rhythm, no murmurs.   Thorax & Lungs: Normal breath sounds, No respiratory distress, No wheezing, No chest tenderness.   Abdomen: Bowel sounds normal, Soft, No tenderness, No masses, No pulsatile masses. No peritoneal signs. Rectal: Guaiac positive, no hemorrhoids  Skin: Warm, Dry, No erythema, No rash.   Back: No bony tenderness, No CVA tenderness.   Extremities: Intact distal pulses, No edema, No tenderness, No cyanosis  Musculoskeletal: Good range of motion in all major joints. No tenderness to palpation or major deformities noted.   Neurologic: Alert , Normal motor function, Normal sensory function, No focal deficits noted.   Psychiatric: Affect normal, Judgment normal, Mood normal.     DIAGNOSTIC STUDIES / PROCEDURES     LABS  Results for orders placed or performed during the hospital encounter of 04/15/17   COD (ADULT)   Result Value Ref Range    ABO Grouping Only A     Rh " Grouping Only POS     Antibody Screen-Cod NEG    CBC WITH DIFFERENTIAL   Result Value Ref Range    WBC 7.3 4.8 - 10.8 K/uL    RBC 5.38 4.70 - 6.10 M/uL    Hemoglobin 16.1 14.0 - 18.0 g/dL    Hematocrit 46.9 42.0 - 52.0 %    MCV 87.2 81.4 - 97.8 fL    MCH 29.9 27.0 - 33.0 pg    MCHC 34.3 33.7 - 35.3 g/dL    RDW 40.6 35.9 - 50.0 fL    Platelet Count 337 164 - 446 K/uL    MPV 8.9 (L) 9.0 - 12.9 fL    Neutrophils-Polys 69.30 44.00 - 72.00 %    Lymphocytes 20.90 (L) 22.00 - 41.00 %    Monocytes 6.80 0.00 - 13.40 %    Eosinophils 1.80 0.00 - 6.90 %    Basophils 0.70 0.00 - 1.80 %    Immature Granulocytes 0.50 0.00 - 0.90 %    Nucleated RBC 0.00 /100 WBC    Neutrophils (Absolute) 5.07 1.82 - 7.42 K/uL    Lymphs (Absolute) 1.53 1.00 - 4.80 K/uL    Monos (Absolute) 0.50 0.00 - 0.85 K/uL    Eos (Absolute) 0.13 0.00 - 0.51 K/uL    Baso (Absolute) 0.05 0.00 - 0.12 K/uL    Immature Granulocytes (abs) 0.04 0.00 - 0.11 K/uL    NRBC (Absolute) 0.00 K/uL   COMP METABOLIC PANEL   Result Value Ref Range    Sodium 138 135 - 145 mmol/L    Potassium 4.2 3.6 - 5.5 mmol/L    Chloride 103 96 - 112 mmol/L    Co2 24 20 - 33 mmol/L    Anion Gap 11.0 0.0 - 11.9    Glucose 79 65 - 99 mg/dL    Bun 14 8 - 22 mg/dL    Creatinine 0.99 0.50 - 1.40 mg/dL    Calcium 9.8 8.5 - 10.5 mg/dL    AST(SGOT) 17 12 - 45 U/L    ALT(SGPT) 22 2 - 50 U/L    Alkaline Phosphatase 106 (H) 30 - 99 U/L    Total Bilirubin 0.6 0.1 - 1.5 mg/dL    Albumin 4.5 3.2 - 4.9 g/dL    Total Protein 7.8 6.0 - 8.2 g/dL    Globulin 3.3 1.9 - 3.5 g/dL    A-G Ratio 1.4 g/dL   LIPASE   Result Value Ref Range    Lipase 31 11 - 82 U/L   PROTHROMBIN TIME   Result Value Ref Range    PT 13.0 12.0 - 14.6 sec    INR 0.95 0.87 - 1.13   APTT   Result Value Ref Range    APTT 26.4 24.7 - 36.0 sec   ESTIMATED GFR   Result Value Ref Range    GFR If African American >60 >60 mL/min/1.73 m 2    GFR If Non African American >60 >60 mL/min/1.73 m 2       All labs were reviewed by  me.    RADIOLOGY  DX-CHEST-LIMITED (1 VIEW)   Final Result         No acute cardiac or pulmonary abnormality is identified.        The radiologist's interpretation of all radiological studies and images have been reviewed by me.    COURSE & MEDICAL DECISION MAKING  Pertinent Labs & Imaging studies reviewed. (See chart for details)      5:55 PM - Patient seen and examined at bedside. Ordered chest xray, estimated GFR, COD, CBC, CMP, lipase, prothrombin time, APTT to evaluate his symptoms. I informed the patient that his blood work shows no anemia and that the next step would be a follow up with GI specialists not necessarily a CT.    6:08 PM [facility]aged [facility]    6:13 PM Spoke with Dr. [facility], about the patient's condition. He advises outpatient follow up with his office.    6:31 PM I re-evaluated patient at bedside and informed him of what Dr. Lake suggested. I explained that he would need to schedule an appointment with [facility] for a follow up of his symptoms. At this point there is nothing more to be done at an ER perspective and the patient will be discharged with a GI follow up as well as references for Primary Care.      Decision Making:  This is a 26 y.o. year old male who presents with blood per rectum. History and physical exams above. After evaluation as noted. I discussed the case with [facility] on call and are agreeable ongoing outpatient care and follow-up with agreement that the patient will not require acute inpatient care. Patient is understanding of the consultation as well as plan for outpatient follow-up. He is understanding and percussions if needed. No need for acute blood transfusion given his current H&H.     The patient will return for new or worsening symptoms and is stable at the time of discharge.    DISPOSITION:  Patient will be discharged home in stable condition.    FOLLOW UP:  [address]  [phone]    Schedule an  appointment as soon as possible for a visit      [facility]  [place] [id number]  [phone]    Schedule an appointment as soon as possible for a visit          FINAL IMPRESSION  1. Gastrointestinal hemorrhage, unspecified gastrointestinal hemorrhage type          I, Sharyn Farfan (Scribe), am scribing for, and in the presence of, Tay Saenz M.D..     Electronically signed by: Sharyn Farfan (Jose Luisibe), 4/15/2017    ITay M.D. personally performed the services described in this documentation, as scribed by Sharyn Farfan in my presence, and it is both accurate and complete.    The note accurately reflects work and decisions made by me.  Tay Saenz  4/26/2017  1:38 AM

## 2017-04-16 NOTE — ED NOTES
Pt provided with discharge instructions, instructions for follow up appointment with [facility], s/s of when to seek emergency care.  Pt verbalizes understanding.  Pt discharged in good condition.

## 2017-04-16 NOTE — DISCHARGE INSTRUCTIONS
"Bloody Stools  Bloody stools often mean that there is a problem in the digestive tract. Your caregiver may use the term \"melena\" to describe black, tarry, and bad smelling stools or \"hematochezia\" to describe red or maroon-colored stools. Blood seen in the stool can be caused by bleeding anywhere along the intestinal tract.   A black stool usually means that blood is coming from the upper part of the gastrointestinal tract (esophagus, stomach, or small bowel). Passing maroon-colored stools or bright red blood usually means that blood is coming from lower down in the large bowel or the rectum. However, sometimes massive bleeding in the stomach or small intestine can cause bright red bloody stools.   Consuming black licorice, lead, iron pills, medicines containing bismuth subsalicylate, or blueberries can also cause black stools. Your caregiver can test black stools to see if blood is present.  It is important that the cause of the bleeding be found. Treatment can then be started, and the problem can be corrected. Rectal bleeding may not be serious, but you should not assume everything is okay until you know the cause. It is very important to follow up with your caregiver or a specialist in gastrointestinal problems.  CAUSES   Blood in the stools can come from various underlying causes. Often, the cause is not found during your first visit. Testing is often needed to discover the cause of bleeding in the gastrointestinal tract. Causes range from simple to serious or even life-threatening. Possible causes include:  · Hemorrhoids. These are veins that are full of blood (engorged) in the rectum. They cause pain, inflammation, and may bleed.  · Anal fissures. These are areas of painful tearing which may bleed. They are often caused by passing hard stool.  · Diverticulosis. These are pouches that form on the colon over time, with age, and may bleed significantly.  · Diverticulitis. This is inflammation in areas with " diverticulosis. It can cause pain, fever, and bloody stools, although bleeding is rare.  · Proctitis and colitis. These are inflamed areas of the rectum or colon. They may cause pain, fever, and bloody stools.  · Polyps and cancer. Colon cancer is a leading cause of preventable cancer death. It often starts out as precancerous polyps that can be removed during a colonoscopy, preventing progression into cancer. Sometimes, polyps and cancer may cause rectal bleeding.  · Gastritis and ulcers. Bleeding from the upper gastrointestinal tract (near the stomach) may travel through the intestines and produce black, sometimes tarry, often bad smelling stools. In certain cases, if the bleeding is fast enough, the stools may not be black, but red and the condition may be life-threatening.  SYMPTOMS   You may have stools that are bright red and bloody, that are normal color with blood on them, or that are dark black and tarry. In some cases, you may only have blood in the toilet bowl. Any of these cases need medical care. You may also have:  · Pain at the anus or anywhere in the rectum.  · Lightheadedness or feeling faint.  · Extreme weakness.  · Nausea or vomiting.  · Fever.  DIAGNOSIS  Your caregiver may use the following methods to find the cause of your bleeding:  · Taking a medical history. Age is important. Older people tend to develop polyps and cancer more often. If there is anal pain and a hard, large stool associated with bleeding, a tear of the anus may be the cause. If blood drips into the toilet after a bowel movement, bleeding hemorrhoids may be the problem. The color and frequency of the bleeding are additional considerations. In most cases, the medical history provides clues, but seldom the final answer.  · A visual and finger (digital) exam. Your caregiver will inspect the anal area, looking for tears and hemorrhoids. A finger exam can provide information when there is tenderness or a growth inside. In men, the  prostate is also examined.  · Endoscopy. Several types of small, long scopes (endoscopes) are used to view the colon.  · In the office, your caregiver may use a rigid, or more commonly, a flexible viewing sigmoidoscope. This exam is called flexible sigmoidoscopy. It is performed in 5 to 10 minutes.  · A more thorough exam is accomplished with a colonoscope. It allows your caregiver to view the entire 5 to 6 foot long colon. Medicine to help you relax (sedative) is usually given for this exam. Frequently, a bleeding lesion may be present beyond the reach of the sigmoidoscope. So, a colonoscopy may be the best exam to start with. Both exams are usually done on an outpatient basis. This means the patient does not stay overnight in the hospital or surgery center.  · An upper endoscopy may be needed to examine your stomach. Sedation is used and a flexible endoscope is put in your mouth, down to your stomach.  · A barium enema X-ray. This is an X-ray exam. It uses liquid barium inserted by enema into the rectum. This test alone may not identify an actual bleeding point. X-rays highlight abnormal shadows, such as those made by lumps (tumors), diverticuli, or colitis.  TREATMENT   Treatment depends on the cause of your bleeding.   · For bleeding from the stomach or colon, the caregiver doing your endoscopy or colonoscopy may be able to stop the bleeding as part of the procedure.  · Inflammation or infection of the colon can be treated with medicines.  · Many rectal problems can be treated with creams, suppositories, or warm baths.  · Surgery is sometimes needed.  · Blood transfusions are sometimes needed if you have lost a lot of blood.  · For any bleeding problem, let your caregiver know if you take aspirin or other blood thinners regularly.  HOME CARE INSTRUCTIONS   · Take any medicines exactly as prescribed.  · Keep your stools soft by eating a diet high in fiber. Prunes (1 to 3 a day) work well for many people.  · Drink  enough water and fluids to keep your urine clear or pale yellow.  · Take sitz baths if advised. A sitz bath is when you sit in a bathtub with warm water for 10 to 15 minutes to soak, soothe, and cleanse the rectal area.  · If enemas or suppositories are advised, be sure you know how to use them. Tell your caregiver if you have problems with this.  · Monitor your bowel movements to look for signs of improvement or worsening.  SEEK MEDICAL CARE IF:   · You do not improve in the time expected.  · Your condition worsens after initial improvement.  · You develop any new symptoms.  SEEK IMMEDIATE MEDICAL CARE IF:   · You develop severe or prolonged rectal bleeding.  · You vomit blood.  · You feel weak or faint.  · You have a fever.  MAKE SURE YOU:  · Understand these instructions.  · Will watch your condition.  · Will get help right away if you are not doing well or get worse.  Document Released: 12/08/2003 Document Revised: 03/11/2013 Document Reviewed: 05/04/2012  [employer]® Patient Information ©2014 [employer].

## 2017-04-16 NOTE — ED NOTES
Pt reports intermittent bright red and dark bloody stools x 1 month.  Pt reports history of colon cancer.  Pt also reports some facial numbness and dizziness, neuro WDL.

## 2018-01-23 ENCOUNTER — APPOINTMENT (OUTPATIENT)
Dept: RADIOLOGY | Facility: MEDICAL CENTER | Age: 28
End: 2018-01-23
Attending: EMERGENCY MEDICINE
Payer: COMMERCIAL

## 2018-01-23 ENCOUNTER — HOSPITAL ENCOUNTER (EMERGENCY)
Facility: MEDICAL CENTER | Age: 28
End: 2018-01-23
Attending: EMERGENCY MEDICINE
Payer: COMMERCIAL

## 2018-01-23 VITALS
WEIGHT: 196.21 LBS | OXYGEN SATURATION: 94 % | HEIGHT: 69 IN | HEART RATE: 65 BPM | SYSTOLIC BLOOD PRESSURE: 135 MMHG | BODY MASS INDEX: 29.06 KG/M2 | RESPIRATION RATE: 18 BRPM | DIASTOLIC BLOOD PRESSURE: 75 MMHG | TEMPERATURE: 98.2 F

## 2018-01-23 DIAGNOSIS — R11.2 NAUSEA AND VOMITING, INTRACTABILITY OF VOMITING NOT SPECIFIED, UNSPECIFIED VOMITING TYPE: ICD-10-CM

## 2018-01-23 DIAGNOSIS — R10.10 PAIN OF UPPER ABDOMEN: ICD-10-CM

## 2018-01-23 LAB
ALBUMIN SERPL BCP-MCNC: 4.2 G/DL (ref 3.2–4.9)
ALBUMIN/GLOB SERPL: 1.6 G/DL
ALP SERPL-CCNC: 65 U/L (ref 30–99)
ALT SERPL-CCNC: 19 U/L (ref 2–50)
ANION GAP SERPL CALC-SCNC: 8 MMOL/L (ref 0–11.9)
APPEARANCE UR: CLEAR
AST SERPL-CCNC: 19 U/L (ref 12–45)
BASOPHILS # BLD AUTO: 0.6 % (ref 0–1.8)
BASOPHILS # BLD: 0.04 K/UL (ref 0–0.12)
BILIRUB SERPL-MCNC: 0.6 MG/DL (ref 0.1–1.5)
BUN SERPL-MCNC: 16 MG/DL (ref 8–22)
CALCIUM SERPL-MCNC: 8.8 MG/DL (ref 8.5–10.5)
CHLORIDE SERPL-SCNC: 103 MMOL/L (ref 96–112)
CO2 SERPL-SCNC: 25 MMOL/L (ref 20–33)
COLOR UR AUTO: YELLOW
CREAT SERPL-MCNC: 0.95 MG/DL (ref 0.5–1.4)
EOSINOPHIL # BLD AUTO: 0.16 K/UL (ref 0–0.51)
EOSINOPHIL NFR BLD: 2.5 % (ref 0–6.9)
ERYTHROCYTE [DISTWIDTH] IN BLOOD BY AUTOMATED COUNT: 40.7 FL (ref 35.9–50)
GLOBULIN SER CALC-MCNC: 2.7 G/DL (ref 1.9–3.5)
GLUCOSE SERPL-MCNC: 88 MG/DL (ref 65–99)
GLUCOSE UR QL STRIP.AUTO: NEGATIVE MG/DL
HCT VFR BLD AUTO: 44.3 % (ref 42–52)
HGB BLD-MCNC: 15.5 G/DL (ref 14–18)
IMM GRANULOCYTES # BLD AUTO: 0.04 K/UL (ref 0–0.11)
IMM GRANULOCYTES NFR BLD AUTO: 0.6 % (ref 0–0.9)
KETONES UR QL STRIP.AUTO: NEGATIVE MG/DL
LEUKOCYTE ESTERASE UR QL STRIP.AUTO: NEGATIVE
LIPASE SERPL-CCNC: 28 U/L (ref 11–82)
LYMPHOCYTES # BLD AUTO: 1.85 K/UL (ref 1–4.8)
LYMPHOCYTES NFR BLD: 28.9 % (ref 22–41)
MCH RBC QN AUTO: 30.2 PG (ref 27–33)
MCHC RBC AUTO-ENTMCNC: 35 G/DL (ref 33.7–35.3)
MCV RBC AUTO: 86.2 FL (ref 81.4–97.8)
MONOCYTES # BLD AUTO: 0.5 K/UL (ref 0–0.85)
MONOCYTES NFR BLD AUTO: 7.8 % (ref 0–13.4)
NEUTROPHILS # BLD AUTO: 3.82 K/UL (ref 1.82–7.42)
NEUTROPHILS NFR BLD: 59.6 % (ref 44–72)
NITRITE UR QL STRIP.AUTO: NEGATIVE
NRBC # BLD AUTO: 0 K/UL
NRBC BLD-RTO: 0 /100 WBC
PH UR STRIP.AUTO: 5.5 [PH]
PLATELET # BLD AUTO: 238 K/UL (ref 164–446)
PMV BLD AUTO: 9.1 FL (ref 9–12.9)
POTASSIUM SERPL-SCNC: 4.6 MMOL/L (ref 3.6–5.5)
PROT SERPL-MCNC: 6.9 G/DL (ref 6–8.2)
PROT UR QL STRIP: NEGATIVE MG/DL
RBC # BLD AUTO: 5.14 M/UL (ref 4.7–6.1)
RBC UR QL AUTO: NEGATIVE
SODIUM SERPL-SCNC: 136 MMOL/L (ref 135–145)
SP GR UR: 1.01
WBC # BLD AUTO: 6.4 K/UL (ref 4.8–10.8)

## 2018-01-23 PROCEDURE — 81002 URINALYSIS NONAUTO W/O SCOPE: CPT

## 2018-01-23 PROCEDURE — 85025 COMPLETE CBC W/AUTO DIFF WBC: CPT

## 2018-01-23 PROCEDURE — 700117 HCHG RX CONTRAST REV CODE 255: Performed by: EMERGENCY MEDICINE

## 2018-01-23 PROCEDURE — 700111 HCHG RX REV CODE 636 W/ 250 OVERRIDE (IP): Performed by: EMERGENCY MEDICINE

## 2018-01-23 PROCEDURE — 83690 ASSAY OF LIPASE: CPT

## 2018-01-23 PROCEDURE — 99285 EMERGENCY DEPT VISIT HI MDM: CPT

## 2018-01-23 PROCEDURE — 80053 COMPREHEN METABOLIC PANEL: CPT

## 2018-01-23 PROCEDURE — 74177 CT ABD & PELVIS W/CONTRAST: CPT

## 2018-01-23 PROCEDURE — 36415 COLL VENOUS BLD VENIPUNCTURE: CPT

## 2018-01-23 PROCEDURE — 96374 THER/PROPH/DIAG INJ IV PUSH: CPT | Mod: XU

## 2018-01-23 PROCEDURE — 96375 TX/PRO/DX INJ NEW DRUG ADDON: CPT

## 2018-01-23 PROCEDURE — 700105 HCHG RX REV CODE 258: Performed by: EMERGENCY MEDICINE

## 2018-01-23 RX ORDER — SODIUM CHLORIDE 9 MG/ML
1000 INJECTION, SOLUTION INTRAVENOUS ONCE
Status: COMPLETED | OUTPATIENT
Start: 2018-01-23 | End: 2018-01-23

## 2018-01-23 RX ORDER — ONDANSETRON 4 MG/1
4 TABLET, ORALLY DISINTEGRATING ORAL EVERY 8 HOURS PRN
Qty: 10 TAB | Refills: 1 | Status: SHIPPED | OUTPATIENT
Start: 2018-01-23 | End: 2019-04-05

## 2018-01-23 RX ORDER — ONDANSETRON 2 MG/ML
4 INJECTION INTRAMUSCULAR; INTRAVENOUS ONCE
Status: COMPLETED | OUTPATIENT
Start: 2018-01-23 | End: 2018-01-23

## 2018-01-23 RX ORDER — MORPHINE SULFATE 4 MG/ML
4 INJECTION, SOLUTION INTRAMUSCULAR; INTRAVENOUS ONCE
Status: COMPLETED | OUTPATIENT
Start: 2018-01-23 | End: 2018-01-23

## 2018-01-23 RX ADMIN — SODIUM CHLORIDE 1000 ML: 9 INJECTION, SOLUTION INTRAVENOUS at 10:26

## 2018-01-23 RX ADMIN — IOHEXOL 80 ML: 350 INJECTION, SOLUTION INTRAVENOUS at 13:45

## 2018-01-23 RX ADMIN — MORPHINE SULFATE 4 MG: 4 INJECTION INTRAVENOUS at 11:20

## 2018-01-23 RX ADMIN — ONDANSETRON 4 MG: 2 INJECTION INTRAMUSCULAR; INTRAVENOUS at 10:24

## 2018-01-23 ASSESSMENT — PAIN SCALES - GENERAL
PAINLEVEL_OUTOF10: 5
PAINLEVEL_OUTOF10: 2

## 2018-01-23 NOTE — ED PROVIDER NOTES
"ED Provider Note    CHIEF COMPLAINT  Chief Complaint   Patient presents with   • Abdominal Pain     R sided, onset this am, hx of colon cancer   • Vomiting     x2   • Diarrhea     \"I always have it.\"       HPI  Eddie Daniel is a 27 y.o. male who presents complaining of abdominal pain.    Patient states he was not feeling well yesterday and had nausea that was increasing particularly after having his eyes dilated at the eye doctor's office. Today he has had sharp, right upper quadrant pain radiating into his right shoulder blade. The pain increases in his back with movement and twisting. He admits to vomiting ×2 today without blood and having multiple episodes of diarrhea. He reports that diarrhea is chronic for him and that he intermittently has blood in his stool. He has a history of colon cancer secondary to familial polyposis and has not seen a doctor for the last 6 years for follow-up of this secondary to financial and insurance issues. Patient reports that multiple members of his family have had a GI bug in the last weeks.    Patient denies fever, chills, dysuria, hematuria, chest pain, shortness of breath, weight loss.      ALLERGIES  Allergies   Allergen Reactions   • Hydrocodone-Acetaminophen Rash and Vomiting     Rash on neck and nausea/vomiting.       CURRENT MEDICATIONS  Home Medications     Reviewed by Rachel Mitchell R.N. (Registered Nurse) on 01/23/18 at 0942  Med List Status: Complete   Medication Last Dose Status   ibuprofen (ADVIL) 200 MG Tab 1/13/2017 Active                PAST MEDICAL HISTORY   has a past medical history of Cancer (CMS-[facility]) (2011); Colon cancer (CMS-HCC) (2011); Colon polyps; Dental disorder; Heart burn; Indigestion; MRSA (methicillin resistant Staphylococcus aureus) (05/2011); Other specified disorder of intestines; Pain; and Snoring.    SURGICAL HISTORY   has a past surgical history that includes repair bladder wound/inj,complic (2007); arthroscopy, knee (2/28/2014); " "ileostomy (3/8/2011); colectomy (3/8/2011); ileostomy (5/3/2011); exploratory laparotomy (5/23/2011); ileostomy (5/23/2011); ileostomy (4/3/2012); knee arthrotomy (2007); knee arthroscopy (11/6/2014); and open osteochondral allograft (11/6/2014).    SOCIAL HISTORY  Social History     Social History Main Topics   • Smoking status: Never Smoker   • Smokeless tobacco: Not on file   • Alcohol use No   • Drug use: No   • Sexual activity: Not on file       Family Hx:  Familial polyposis/colon cancer in his grandfather, father, and brother      REVIEW OF SYSTEMS  See HPI for further details.  All other systems are negative except as above in HPI.      PHYSICAL EXAM  VITAL SIGNS: /75   Pulse 66   Temp 36.8 °C (98.2 °F)   Resp 16   Ht 1.753 m (5' 9\")   Wt 89 kg (196 lb 3.4 oz)   SpO2 98%   BMI 28.98 kg/m²     General:  WDWN, nontoxic appearing in NAD; A+Ox3; V/S as above   Skin: warm and dry; good color; no rash  HEENT: NCAT; EOMs intact; PERRL; no scleral icterus   Neck: FROM; supple  Cardiovascular: Regular heart rate and rhythm.  No murmurs, rubs, or gallops; pulses 2+ bilaterally radially   Lungs: Clear to auscultation with good air movement bilaterally.  No wheezes, rhonchi, or rales.   Abdomen: Multiple, well-healed scars across the abdomen; BS present; soft; NTND; no rebound, guarding, or rigidity.  No organomegaly or pulsatile mass  Extremities: SUMMERS x 4; no e/o trauma; no pedal edema; neg Raymond's  Neurologic: CNs III-XII grossly intact; speech clear; distal sensation intact; strength 5/5 UE/LEs  Psychiatric: Appropriate affect, normal mood    LABS  Results for orders placed or performed during the hospital encounter of 01/23/18   CBC WITH DIFFERENTIAL   Result Value Ref Range    WBC 6.4 4.8 - 10.8 K/uL    RBC 5.14 4.70 - 6.10 M/uL    Hemoglobin 15.5 14.0 - 18.0 g/dL    Hematocrit 44.3 42.0 - 52.0 %    MCV 86.2 81.4 - 97.8 fL    MCH 30.2 27.0 - 33.0 pg    MCHC 35.0 33.7 - 35.3 g/dL    RDW 40.7 35.9 - 50.0 " fL    Platelet Count 238 164 - 446 K/uL    MPV 9.1 9.0 - 12.9 fL    Neutrophils-Polys 59.60 44.00 - 72.00 %    Lymphocytes 28.90 22.00 - 41.00 %    Monocytes 7.80 0.00 - 13.40 %    Eosinophils 2.50 0.00 - 6.90 %    Basophils 0.60 0.00 - 1.80 %    Immature Granulocytes 0.60 0.00 - 0.90 %    Nucleated RBC 0.00 /100 WBC    Neutrophils (Absolute) 3.82 1.82 - 7.42 K/uL    Lymphs (Absolute) 1.85 1.00 - 4.80 K/uL    Monos (Absolute) 0.50 0.00 - 0.85 K/uL    Eos (Absolute) 0.16 0.00 - 0.51 K/uL    Baso (Absolute) 0.04 0.00 - 0.12 K/uL    Immature Granulocytes (abs) 0.04 0.00 - 0.11 K/uL    NRBC (Absolute) 0.00 K/uL   CMP   Result Value Ref Range    Sodium 136 135 - 145 mmol/L    Potassium 4.6 3.6 - 5.5 mmol/L    Chloride 103 96 - 112 mmol/L    Co2 25 20 - 33 mmol/L    Anion Gap 8.0 0.0 - 11.9    Glucose 88 65 - 99 mg/dL    Bun 16 8 - 22 mg/dL    Creatinine 0.95 0.50 - 1.40 mg/dL    Calcium 8.8 8.5 - 10.5 mg/dL    AST(SGOT) 19 12 - 45 U/L    ALT(SGPT) 19 2 - 50 U/L    Alkaline Phosphatase 65 30 - 99 U/L    Total Bilirubin 0.6 0.1 - 1.5 mg/dL    Albumin 4.2 3.2 - 4.9 g/dL    Total Protein 6.9 6.0 - 8.2 g/dL    Globulin 2.7 1.9 - 3.5 g/dL    A-G Ratio 1.6 g/dL   LIPASE   Result Value Ref Range    Lipase 28 11 - 82 U/L   ESTIMATED GFR   Result Value Ref Range    GFR If African American >60 >60 mL/min/1.73 m 2    GFR If Non African American >60 >60 mL/min/1.73 m 2   POC UA   Result Value Ref Range    POC Color Yellow     POC Appearance Clear     POC Glucose Negative Negative mg/dL    POC Ketones Negative Negative mg/dL    POC Specific Gravity 1.010 1.005 - 1.030    POC Blood Negative Negative    POC Urine PH 5.5 5.0 - 8.0    POC Protein Negative Negative mg/dL    POC Nitrites Negative Negative    POC Leukocyte Esterase Negative Negative       IMAGING  CT-ABDOMEN-PELVIS WITH   Final Result      1.  Again seen dilated loop of small intestine within the upper abdomen in an area of surgical change. This is similar in appearance to  the prior examination and there is no other evidence of bowel obstruction or focal inflammatory change.      2.  Prior colectomy.      3.  Ventral hernia which contains fat.      4.  Fatty liver.          MEDICAL RECORD  I have reviewed patient's medical record and pertinent results are listed below.      COURSE & MEDICAL DECISION MAKING  I have reviewed any medical record information, laboratory studies and radiographic results as noted.    Eddie Daniel is a 27 y.o. male who presents complaining of abdominal pain, vomiting, diarrhea. Patient does report that diarrhea is baseline for him status post colectomy for colon cancer 6 years ago.    Patient has a soft, nonsurgical abdomen. He is at risk for adhesions/SBO and has not had follow-up of his colon cancer for quite some time. CT scan of the abdomen/pelvis was ordered. Patient preferred to not have oral contrast.      NS bolus was ordered for possible dehydration related to patient's sxs of vomiting and diarrhea.    Patient was given Zofran and morphine    Labs demonstrate a normal white blood cell count, normal hemoglobin and hematocrit, and normal chemistry panel.    Pt was re-evaluated at 1:47 PM  Patient's labs and CT imaging are resulted. No evidence of acute pathology on CT scan and labs demonstrate no significant abnormalities.    2:52 PM  Pt was advised of CT neg for acute/surgical findings.  Pt tolerated po fluids.  Will dc home with return precautions.  Rx for Zofran.    Pt's blood pressure was noted to be above 120/80 here in the ER.  Pt was informed and advised to follow up as an outpatient for recheck for possible dx/management of hypertension.      FINAL IMPRESSION  1. Pain of upper abdomen    2. Nausea and vomiting, intractability of vomiting not specified, unspecified vomiting type        Electronically signed by: Rupa Vega, 1/23/2018 10:00 AM

## 2018-01-23 NOTE — DISCHARGE INSTRUCTIONS
See Dr. Person from GI for colonoscopy  Establish care with a primary care provider;  call the [facility] or \[facility]\"" clinic  Take Zofran as needed for nausea  Return to ER for pain, fever, vomiting, or other concerns    THE EXACT CAUSE OF YOUR PAIN IS UNCLEAR--THIS MIGHT BE EARLY IN THE DISEASE PROCESS AND WE ARE UNABLE TO IDENTIFY IT AT THIS TIME (SUCH AS EARLY APPENDICITIS, FOR EXAMPLE).      RETURN TO ER IN 8-12 HRS UNLESS YOU ARE FEELING COMPLETELY BETTER.    RETURN SOONER FOR PAIN, VOMITING NOT CONTROLLED BY MEDICATIONS, FEVER, ANY OTHER CONCERN.    CLEAR LIQUIDS FOR NEXT 12 HOURS.  ADVANCE DIET AS TOLERATED.        Abdominal Pain, Adult  Many things can cause abdominal pain. Usually, abdominal pain is not caused by a disease and will improve without treatment. It can often be observed and treated at home. Your health care provider will do a physical exam and possibly order blood tests and X-rays to help determine the seriousness of your pain. However, in many cases, more time must pass before a clear cause of the pain can be found. Before that point, your health care provider may not know if you need more testing or further treatment.  HOME CARE INSTRUCTIONS   Monitor your abdominal pain for any changes. The following actions may help to alleviate any discomfort you are experiencing:  · Only take over-the-counter or prescription medicines as directed by your health care provider.  · Do not take laxatives unless directed to do so by your health care provider.  · Try a clear liquid diet (broth, tea, or water) as directed by your health care provider. Slowly move to a bland diet as tolerated.  SEEK MEDICAL CARE IF:  · You have unexplained abdominal pain.  · You have abdominal pain associated with nausea or diarrhea.  · You have pain when you urinate or have a bowel movement.  · You experience abdominal pain that wakes you in the night.  · You have abdominal pain that is worsened or improved by eating food.  · You have  abdominal pain that is worsened with eating fatty foods.  · You have a fever.  SEEK IMMEDIATE MEDICAL CARE IF:   · Your pain does not go away within 2 hours.  · You keep throwing up (vomiting).  · Your pain is felt only in portions of the abdomen, such as the right side or the left lower portion of the abdomen.  · You pass bloody or black tarry stools.  MAKE SURE YOU:  · Understand these instructions.    · Will watch your condition.    · Will get help right away if you are not doing well or get worse.       This information is not intended to replace advice given to you by your health care provider. Make sure you discuss any questions you have with your health care provider.     Document Released: 09/27/2006 Document Revised: 01/08/2016 Document Reviewed: 08/27/2014  Elsevier Interactive Patient Education ©2016 [employer]

## 2018-01-23 NOTE — ED NOTES
Patient dc'd to home w/ family. Patient alert and oriented x 4. Patient ambulatory w/ steady gait. Patient verbalizes understanding of discharge instructions, follow up care, and Rx x 1. Patient denies questions upon discharge.

## 2018-01-23 NOTE — ED NOTES
"Pt amb to triage.  Chief Complaint   Patient presents with   • Abdominal Pain     R sided, onset this am, hx of colon cancer   • Vomiting     x2   • Diarrhea     \"I always have it.\"       "

## 2018-01-23 NOTE — ED NOTES
Patient denies nausea at this time. Patient given 8 ounces of apple juice for PO challenge per patient request. Will re-evaluate patient.

## 2018-01-30 ENCOUNTER — OFFICE VISIT (OUTPATIENT)
Dept: URGENT CARE | Facility: PHYSICIAN GROUP | Age: 28
End: 2018-01-30
Payer: COMMERCIAL

## 2018-01-30 VITALS
SYSTOLIC BLOOD PRESSURE: 124 MMHG | WEIGHT: 196 LBS | DIASTOLIC BLOOD PRESSURE: 76 MMHG | OXYGEN SATURATION: 97 % | BODY MASS INDEX: 29.03 KG/M2 | RESPIRATION RATE: 16 BRPM | TEMPERATURE: 98.1 F | HEART RATE: 76 BPM | HEIGHT: 69 IN

## 2018-01-30 DIAGNOSIS — R21 PENILE RASH: ICD-10-CM

## 2018-01-30 PROCEDURE — 99213 OFFICE O/P EST LOW 20 MIN: CPT | Performed by: EMERGENCY MEDICINE

## 2018-01-30 ASSESSMENT — ENCOUNTER SYMPTOMS
EYE DISCHARGE: 0
FEVER: 0
NAUSEA: 0
COUGH: 0
CHILLS: 0
NERVOUS/ANXIOUS: 0
VOMITING: 0
EYE REDNESS: 1
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
## 2018-03-13 ENCOUNTER — OFFICE VISIT (OUTPATIENT)
Dept: URGENT CARE | Facility: PHYSICIAN GROUP | Age: 28
End: 2018-03-13
Payer: COMMERCIAL

## 2018-03-13 ENCOUNTER — APPOINTMENT (OUTPATIENT)
Dept: RADIOLOGY | Facility: IMAGING CENTER | Age: 28
End: 2018-03-13
Attending: PHYSICIAN ASSISTANT
Payer: COMMERCIAL

## 2018-03-13 VITALS
HEART RATE: 74 BPM | OXYGEN SATURATION: 96 % | SYSTOLIC BLOOD PRESSURE: 118 MMHG | WEIGHT: 197 LBS | BODY MASS INDEX: 29.18 KG/M2 | TEMPERATURE: 98.8 F | DIASTOLIC BLOOD PRESSURE: 68 MMHG | HEIGHT: 69 IN

## 2018-03-13 DIAGNOSIS — S40.011A CONTUSION OF RIGHT SHOULDER, INITIAL ENCOUNTER: Primary | ICD-10-CM

## 2018-03-13 DIAGNOSIS — S49.91XA INJURY OF RIGHT SHOULDER, INITIAL ENCOUNTER: ICD-10-CM
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
## 2018-03-13 PROCEDURE — 99214 OFFICE O/P EST MOD 30 MIN: CPT | Performed by: PHYSICIAN ASSISTANT

## 2018-03-13 PROCEDURE — 73030 X-RAY EXAM OF SHOULDER: CPT | Mod: TC,LT | Performed by: PHYSICIAN ASSISTANT

## 2018-03-13 RX ORDER — MELOXICAM 15 MG/1
15 TABLET ORAL DAILY
Qty: 30 TAB | Refills: 0 | Status: SHIPPED | OUTPATIENT
Start: 2018-03-13 | End: 2018-11-09

## 2018-03-13 RX ORDER — CYCLOBENZAPRINE HCL 10 MG
10 TABLET ORAL 3 TIMES DAILY PRN
Qty: 30 TAB | Refills: 0 | Status: SHIPPED | OUTPATIENT
Start: 2018-03-13 | End: 2018-11-09
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
## 2018-03-15 ASSESSMENT — ENCOUNTER SYMPTOMS
NUMBNESS: 0
SENSORY CHANGE: 0
BACK PAIN: 0
NECK PAIN: 0
MUSCLE WEAKNESS: 0
TINGLING: 0
FOCAL WEAKNESS: 0
LOSS OF CONSCIOUSNESS: 0

## 2018-04-23 ENCOUNTER — EH NON-PROVIDER (OUTPATIENT)
Dept: OCCUPATIONAL MEDICINE | Facility: CLINIC | Age: 28
End: 2018-04-23

## 2018-04-23 ENCOUNTER — EMPLOYEE HEALTH (OUTPATIENT)
Dept: OCCUPATIONAL MEDICINE | Facility: CLINIC | Age: 28
End: 2018-04-23

## 2018-04-23 ENCOUNTER — HOSPITAL ENCOUNTER (OUTPATIENT)
Facility: MEDICAL CENTER | Age: 28
End: 2018-04-23
Attending: PREVENTIVE MEDICINE
Payer: COMMERCIAL

## 2018-04-23 VITALS
WEIGHT: 201 LBS | HEIGHT: 69 IN | SYSTOLIC BLOOD PRESSURE: 122 MMHG | BODY MASS INDEX: 29.77 KG/M2 | DIASTOLIC BLOOD PRESSURE: 80 MMHG

## 2018-04-23 DIAGNOSIS — Z02.1 PRE-EMPLOYMENT HEALTH SCREENING EXAMINATION: ICD-10-CM

## 2018-04-23 DIAGNOSIS — Z02.1 PRE-EMPLOYMENT DRUG SCREENING: ICD-10-CM

## 2018-04-23 LAB
AMP AMPHETAMINE: NORMAL
BAR BARBITURATES: NORMAL
BZO BENZODIAZEPINES: NORMAL
COC COCAINE: NORMAL
INT CON NEG: NORMAL
INT CON POS: NORMAL
MDMA ECSTASY: NORMAL
MET METHAMPHETAMINES: NORMAL
MTD METHADONE: NORMAL
OPI OPIATES: NORMAL
OXY OXYCODONE: NORMAL
PCP PHENCYCLIDINE: NORMAL
POC URINE DRUG SCREEN OCDRS: NORMAL
THC: NORMAL

## 2018-04-23 PROCEDURE — 86480 TB TEST CELL IMMUN MEASURE: CPT | Performed by: PREVENTIVE MEDICINE

## 2018-04-23 PROCEDURE — 90715 TDAP VACCINE 7 YRS/> IM: CPT | Performed by: PREVENTIVE MEDICINE

## 2018-04-23 PROCEDURE — 8915 PR COMPREHENSIVE PHYSICAL: Performed by: PREVENTIVE MEDICINE

## 2018-04-23 PROCEDURE — 90686 IIV4 VACC NO PRSV 0.5 ML IM: CPT | Performed by: PREVENTIVE MEDICINE

## 2018-04-23 PROCEDURE — 80305 DRUG TEST PRSMV DIR OPT OBS: CPT | Performed by: PREVENTIVE MEDICINE

## 2018-04-23 PROCEDURE — 86787 VARICELLA-ZOSTER ANTIBODY: CPT | Performed by: PREVENTIVE MEDICINE

## 2018-04-23 PROCEDURE — 90746 HEPB VACCINE 3 DOSE ADULT IM: CPT | Performed by: PREVENTIVE MEDICINE

## 2018-04-24 LAB — VZV IGG SER IA-ACNC: 2.45

## 2018-04-25 LAB
M TB TUBERC IFN-G BLD QL: NEGATIVE
M TB TUBERC IFN-G/MITOGEN IGNF BLD: 0
M TB TUBERC IGNF/MITOGEN IGNF CONTROL: 48.83 [IU]/ML
MITOGEN IGNF BCKGRD COR BLD-ACNC: 0.02 [IU]/ML

## 2018-04-27 ENCOUNTER — DOCUMENTATION (OUTPATIENT)
Dept: OCCUPATIONAL MEDICINE | Facility: CLINIC | Age: 28
End: 2018-04-27

## 2018-08-19 ENCOUNTER — HOSPITAL ENCOUNTER (EMERGENCY)
Facility: MEDICAL CENTER | Age: 28
End: 2018-08-19
Attending: EMERGENCY MEDICINE
Payer: COMMERCIAL

## 2018-08-19 ENCOUNTER — APPOINTMENT (OUTPATIENT)
Dept: RADIOLOGY | Facility: MEDICAL CENTER | Age: 28
End: 2018-08-19
Attending: EMERGENCY MEDICINE
Payer: COMMERCIAL

## 2018-08-19 VITALS
TEMPERATURE: 98.2 F | HEART RATE: 84 BPM | HEIGHT: 69 IN | RESPIRATION RATE: 18 BRPM | DIASTOLIC BLOOD PRESSURE: 77 MMHG | SYSTOLIC BLOOD PRESSURE: 133 MMHG | BODY MASS INDEX: 29.62 KG/M2 | OXYGEN SATURATION: 99 % | WEIGHT: 199.96 LBS

## 2018-08-19 DIAGNOSIS — F07.81 CONCUSSION SYNDROME: ICD-10-CM

## 2018-08-19 DIAGNOSIS — S01.81XA FACIAL LACERATION, INITIAL ENCOUNTER: ICD-10-CM

## 2018-08-19 PROCEDURE — 304217 HCHG IRRIGATION SYSTEM

## 2018-08-19 PROCEDURE — 99284 EMERGENCY DEPT VISIT MOD MDM: CPT

## 2018-08-19 PROCEDURE — 303747 HCHG EXTRA SUTURE

## 2018-08-19 PROCEDURE — 304999 HCHG REPAIR-SIMPLE/INTERMED LEVEL 1

## 2018-08-19 PROCEDURE — 70450 CT HEAD/BRAIN W/O DYE: CPT

## 2018-08-19 PROCEDURE — 700101 HCHG RX REV CODE 250: Performed by: EMERGENCY MEDICINE

## 2018-08-19 PROCEDURE — 70486 CT MAXILLOFACIAL W/O DYE: CPT

## 2018-08-19 RX ORDER — LIDOCAINE HYDROCHLORIDE 10 MG/ML
20 INJECTION, SOLUTION INFILTRATION; PERINEURAL ONCE
Status: COMPLETED | OUTPATIENT
Start: 2018-08-19 | End: 2018-08-19

## 2018-08-19 RX ADMIN — LIDOCAINE HYDROCHLORIDE 20 ML: 10 INJECTION, SOLUTION INFILTRATION; PERINEURAL at 03:00

## 2018-08-19 ASSESSMENT — PAIN SCALES - GENERAL: PAINLEVEL_OUTOF10: 9
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
## 2018-08-19 NOTE — DISCHARGE INSTRUCTIONS
Please follow-up with primary care, urgent care, or this emergency department for suture removal and wound recheck.  Return to the emergency department if you develop any new or worsening symptoms including worsening pain, drainage from the wound, redness or swelling, worsening headache, nausea or vomiting, or any further concerns.  The sutures will have to be removed in 3-5 days.  After 24 hours you may shower normally, do not soak the area until the sutures are removed.  Please follow-up with your primary care physician within 24-48 hours for recheck.  Do not participate in any contact activities until cleared to return by her primary care physician.        Laceration Care, Adult  A laceration is a cut that goes through all of the layers of the skin and into the tissue that is right under the skin. Some lacerations heal on their own. Others need to be closed with stitches (sutures), staples, skin adhesive strips, or skin glue. Proper laceration care minimizes the risk of infection and helps the laceration to heal better.  HOW TO CARE FOR YOUR LACERATION  If sutures or staples were used:  · Keep the wound clean and dry.  · If you were given a bandage (dressing), you should change it at least one time per day or as told by your health care provider. You should also change it if it becomes wet or dirty.  · Keep the wound completely dry for the first 24 hours or as told by your health care provider. After that time, you may shower or bathe. However, make sure that the wound is not soaked in water until after the sutures or staples have been removed.  · Clean the wound one time each day or as told by your health care provider:  ¨ Wash the wound with soap and water.  ¨ Rinse the wound with water to remove all soap.  ¨ Pat the wound dry with a clean towel. Do not rub the wound.  · After cleaning the wound, apply a thin layer of antibiotic ointment as told by your health care provider. This will help to prevent infection  and keep the dressing from sticking to the wound.  · Have the sutures or staples removed as told by your health care provider.  If skin adhesive strips were used:  · Keep the wound clean and dry.  · If you were given a bandage (dressing), you should change it at least one time per day or as told by your health care provider. You should also change it if it becomes dirty or wet.  · Do not get the skin adhesive strips wet. You may shower or bathe, but be careful to keep the wound dry.  · If the wound gets wet, pat it dry with a clean towel. Do not rub the wound.  · Skin adhesive strips fall off on their own. You may trim the strips as the wound heals. Do not remove skin adhesive strips that are still stuck to the wound. They will fall off in time.  If skin glue was used:  · Try to keep the wound dry, but you may briefly wet it in the shower or bath. Do not soak the wound in water, such as by swimming.  · After you have showered or bathed, gently pat the wound dry with a clean towel. Do not rub the wound.  · Do not do any activities that will make you sweat heavily until the skin glue has fallen off on its own.  · Do not apply liquid, cream, or ointment medicine to the wound while the skin glue is in place. Using those may loosen the film before the wound has healed.  · If you were given a bandage (dressing), you should change it at least one time per day or as told by your health care provider. You should also change it if it becomes dirty or wet.  · If a dressing is placed over the wound, be careful not to apply tape directly over the skin glue. Doing that may cause the glue to be pulled off before the wound has healed.  · Do not pick at the glue. The skin glue usually remains in place for 5-10 days, then it falls off of the skin.  General Instructions  · Take over-the-counter and prescription medicines only as told by your health care provider.  · If you were prescribed an antibiotic medicine or ointment, take or  apply it as told by your doctor. Do not stop using it even if your condition improves.  · To help prevent scarring, make sure to cover your wound with sunscreen whenever you are outside after stitches are removed, after adhesive strips are removed, or when glue remains in place and the wound is healed. Make sure to wear a sunscreen of at least 30 SPF.  · Do not scratch or pick at the wound.  · Keep all follow-up visits as told by your health care provider. This is important.  · Check your wound every day for signs of infection. Watch for:  ¨ Redness, swelling, or pain.  ¨ Fluid, blood, or pus.  · Raise (elevate) the injured area above the level of your heart while you are sitting or lying down, if possible.  SEEK MEDICAL CARE IF:  · You received a tetanus shot and you have swelling, severe pain, redness, or bleeding at the injection site.  · You have a fever.  · A wound that was closed breaks open.  · You notice a bad smell coming from your wound or your dressing.  · You notice something coming out of the wound, such as wood or glass.  · Your pain is not controlled with medicine.  · You have increased redness, swelling, or pain at the site of your wound.  · You have fluid, blood, or pus coming from your wound.  · You notice a change in the color of your skin near your wound.  · You need to change the dressing frequently due to fluid, blood, or pus draining from the wound.  · You develop a new rash.  · You develop numbness around the wound.  SEEK IMMEDIATE MEDICAL CARE IF:  · You develop severe swelling around the wound.  · Your pain suddenly increases and is severe.  · You develop painful lumps near the wound or on skin that is anywhere on your body.  · You have a red streak going away from your wound.  · The wound is on your hand or foot and you cannot properly move a finger or toe.  · The wound is on your hand or foot and you notice that your fingers or toes look pale or bluish.     This information is not  intended to replace advice given to you by your health care provider. Make sure you discuss any questions you have with your health care provider.     Document Released: 12/18/2006 Document Revised: 05/03/2016 Document Reviewed: 12/14/2015  [employer] Interactive Patient Education ©2016 [employer]    Concussion, Adult  A concussion is a brain injury. It is caused by:  · A hit to the head.  · A quick and sudden movement (jolt) of the head or neck.  A concussion is usually not life threatening. Even so, it can cause serious problems. If you had a concussion before, you may have concussion-like problems after a hit to your head.  Follow these instructions at home:  General instructions  · Follow your doctor's directions carefully.  · Take medicines only as told by your doctor.  · Only take medicines your doctor says are safe.  · Do not drink alcohol until your doctor says it is okay. Alcohol and some drugs can slow down healing. They can also put you at risk for further injury.  · If you are having trouble remembering things, write them down.  · Try to do one thing at a time if you get distracted easily. For example, do not watch TV while making dinner.  · Talk to your family members or close friends when making important decisions.  · Follow up with your doctor as told.  · Watch your symptoms. Tell others to do the same. Serious problems can sometimes happen after a concussion. Older adults are more likely to have these problems.  · Tell your teachers, school nurse, school counselor, , , or  about your concussion. Tell them about what you can or cannot do. They should watch to see if:  ¨ It gets even harder for you to pay attention or concentrate.  ¨ It gets even harder for you to remember things or learn new things.  ¨ You need more time than normal to finish things.  ¨ You become annoyed (irritable) more than before.  ¨ You are not able to deal with stress as well.  ¨ You have more  problems than before.  · Rest. Make sure you:  ¨ Get plenty of sleep at night.  ¨ Go to sleep early.  ¨ Go to bed at the same time every day. Try to wake up at the same time.  ¨ Rest during the day.  ¨ Take naps when you feel tired.  · Limit activities where you have to think a lot or concentrate. These include:  ¨ Doing homework.  ¨ Doing work related to a job.  ¨ Watching TV.  ¨ Using the computer.  Returning To Your Regular Activities   Return to your normal activities slowly, not all at once. You must give your body and brain enough time to heal.  · Do not play sports or do other athletic activities until your doctor says it is okay.  · Ask your doctor when you can drive, ride a bicycle, or work other vehicles or machines. Never do these things if you feel dizzy.  · Ask your doctor about when you can return to work or school.  Preventing Another Concussion   It is very important to avoid another brain injury, especially before you have healed. In rare cases, another injury can lead to permanent brain damage, brain swelling, or death. The risk of this is greatest during the first 7-10 days after your injury. Avoid injuries by:  · Wearing a seat belt when riding in a car.  · Not drinking too much alcohol.  · Avoiding activities that could lead to a second concussion (such as contact sports).  · Wearing a helmet when doing activities like:  ¨ Biking.  ¨ Skiing.  ¨ Skateboarding.  ¨ Skating.  · Making your home safer by:  ¨ Removing things from the floor or stairways that could make you trip.  ¨ Using grab bars in bathrooms and handrails by stairs.  ¨ Placing non-slip mats on floors and in bathtubs.  ¨ Improve lighting in dark areas.  Contact a doctor if:  · It gets even harder for you to pay attention or concentrate.  · It gets even harder for you to remember things or learn new things.  · You need more time than normal to finish things.  · You become annoyed (irritable) more than before.  · You are not able to  deal with stress as well.  · You have more problems than before.  · You have problems keeping your balance.  · You are not able to react quickly when you should.  Get help if you have any of these problems for more than 2 weeks:  · Lasting (chronic) headaches.  · Dizziness or trouble balancing.  · Feeling sick to your stomach (nausea).  · Seeing (vision) problems.  · Being affected by noises or light more than normal.  · Feeling sad, low, down in the dumps, blue, gloomy, or empty (depressed).  · Mood changes (mood swings).  · Feeling of fear or nervousness about what may happen (anxiety).  · Feeling annoyed.  · Memory problems.  · Problems concentrating or paying attention.  · Sleep problems.  · Feeling tired all the time.  Get help right away if:  · You have bad headaches or your headaches get worse.  · You have weakness (even if it is in one hand, leg, or part of the face).  · You have loss of feeling (numbness).  · You feel off balance.  · You keep throwing up (vomiting).  · You feel tired.  · One black center of your eye (pupil) is larger than the other.  · You twitch or shake violently (convulse).  · Your speech is not clear (slurred).  · You are more confused, easily angered (agitated), or annoyed than before.  · You have more trouble resting than before.  · You are unable to recognize people or places.  · You have neck pain.  · It is difficult to wake you up.  · You have unusual behavior changes.  · You pass out (lose consciousness).  This information is not intended to replace advice given to you by your health care provider. Make sure you discuss any questions you have with your health care provider.  Document Released: 12/06/2010 Document Revised: 05/25/2017 Document Reviewed: 07/10/2014  [employer] Interactive Patient Education © 2017 [employer]

## 2018-08-20 ENCOUNTER — OFFICE VISIT (OUTPATIENT)
Dept: URGENT CARE | Facility: PHYSICIAN GROUP | Age: 28
End: 2018-08-20
Payer: COMMERCIAL

## 2018-08-20 VITALS
WEIGHT: 196 LBS | SYSTOLIC BLOOD PRESSURE: 116 MMHG | BODY MASS INDEX: 29.03 KG/M2 | HEIGHT: 69 IN | DIASTOLIC BLOOD PRESSURE: 80 MMHG | TEMPERATURE: 98.9 F | OXYGEN SATURATION: 96 % | HEART RATE: 97 BPM

## 2018-08-20 DIAGNOSIS — S00.83XD CONTUSION OF JAW, SUBSEQUENT ENCOUNTER: ICD-10-CM

## 2018-08-20 DIAGNOSIS — R11.0 NAUSEA: ICD-10-CM

## 2018-08-20 DIAGNOSIS — S06.0X0D CONCUSSION WITHOUT LOSS OF CONSCIOUSNESS, SUBSEQUENT ENCOUNTER: ICD-10-CM

## 2018-08-20 PROCEDURE — 99214 OFFICE O/P EST MOD 30 MIN: CPT | Performed by: NURSE PRACTITIONER

## 2018-08-20 RX ORDER — ACETAMINOPHEN AND CODEINE PHOSPHATE 300; 30 MG/1; MG/1
1-2 TABLET ORAL EVERY 4 HOURS PRN
Qty: 10 TAB | Refills: 0 | Status: SHIPPED | OUTPATIENT
Start: 2018-08-20 | End: 2018-08-23

## 2018-08-20 RX ORDER — ONDANSETRON 4 MG/1
4 TABLET, ORALLY DISINTEGRATING ORAL EVERY 4 HOURS PRN
Qty: 12 TAB | Refills: 0 | Status: SHIPPED | OUTPATIENT
Start: 2018-08-20 | End: 2019-04-05

## 2018-08-20 RX ORDER — ACETAMINOPHEN 500 MG
500-1000 TABLET ORAL EVERY 6 HOURS PRN
COMMUNITY
End: 2019-04-05

## 2018-08-20 ASSESSMENT — ENCOUNTER SYMPTOMS
NAUSEA: 1
DIZZINESS: 1
HEADACHES: 1
LOSS OF CONSCIOUSNESS: 0
VOMITING: 0

## 2018-08-20 NOTE — LETTER
August 20, 2018         Patient: Eddie Daniel   YOB: 1990   Date of Visit: 8/20/2018           To Whom it May Concern:    Eddie Daniel was seen in my clinic on 8/20/2018. Please excuse him from work 8/21/18-8/23/18 and 8/25/18.        Sincerely,           KIRSTY Beatty.  Electronically Signed

## 2018-08-20 NOTE — LETTER
August 20, 2018         Patient: Eddie Daniel   YOB: 1990   Date of Visit: 8/20/2018           To Whom it May Concern:    Eddie Daniel was seen in my clinic on 8/20/2018. Please excuse him from work 8/21/18-8/24/18.      Sincerely,           KIRSTY Beatty.  Electronically Signed
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
## 2018-11-09 ENCOUNTER — OFFICE VISIT (OUTPATIENT)
Dept: URGENT CARE | Facility: MEDICAL CENTER | Age: 28
End: 2018-11-09
Payer: COMMERCIAL

## 2018-11-09 ENCOUNTER — HOSPITAL ENCOUNTER (OUTPATIENT)
Dept: RADIOLOGY | Facility: MEDICAL CENTER | Age: 28
End: 2018-11-09
Attending: PHYSICIAN ASSISTANT
Payer: COMMERCIAL

## 2018-11-09 VITALS
HEART RATE: 68 BPM | RESPIRATION RATE: 16 BRPM | TEMPERATURE: 98 F | BODY MASS INDEX: 31.3 KG/M2 | OXYGEN SATURATION: 98 % | SYSTOLIC BLOOD PRESSURE: 120 MMHG | WEIGHT: 199.4 LBS | DIASTOLIC BLOOD PRESSURE: 82 MMHG | HEIGHT: 67 IN

## 2018-11-09 DIAGNOSIS — S39.012A LUMBOSACRAL STRAIN, INITIAL ENCOUNTER: Primary | ICD-10-CM

## 2018-11-09 DIAGNOSIS — R10.30 LOWER ABDOMINAL PAIN: ICD-10-CM

## 2018-11-09 LAB
APPEARANCE UR: CLEAR
BILIRUB UR STRIP-MCNC: NEGATIVE MG/DL
COLOR UR AUTO: YELLOW
GLUCOSE UR STRIP.AUTO-MCNC: NEGATIVE MG/DL
KETONES UR STRIP.AUTO-MCNC: NEGATIVE MG/DL
LEUKOCYTE ESTERASE UR QL STRIP.AUTO: NEGATIVE
NITRITE UR QL STRIP.AUTO: NEGATIVE
PH UR STRIP.AUTO: 5.5 [PH] (ref 5–8)
PROT UR QL STRIP: NEGATIVE MG/DL
RBC UR QL AUTO: NORMAL
SP GR UR STRIP.AUTO: 1.03
UROBILINOGEN UR STRIP-MCNC: NEGATIVE MG/DL

## 2018-11-09 PROCEDURE — 99214 OFFICE O/P EST MOD 30 MIN: CPT | Performed by: PHYSICIAN ASSISTANT

## 2018-11-09 PROCEDURE — 81002 URINALYSIS NONAUTO W/O SCOPE: CPT | Performed by: PHYSICIAN ASSISTANT

## 2018-11-09 PROCEDURE — 74019 RADEX ABDOMEN 2 VIEWS: CPT

## 2018-11-09 RX ORDER — CYCLOBENZAPRINE HCL 10 MG
10 TABLET ORAL 3 TIMES DAILY PRN
Qty: 30 TAB | Refills: 0 | Status: SHIPPED | OUTPATIENT
Start: 2018-11-09 | End: 2019-04-05

## 2018-11-09 RX ORDER — MELOXICAM 15 MG/1
15 TABLET ORAL DAILY
Qty: 30 TAB | Refills: 0 | Status: SHIPPED | OUTPATIENT
Start: 2018-11-09 | End: 2018-12-09

## 2018-11-09 ASSESSMENT — ENCOUNTER SYMPTOMS
ABDOMINAL PAIN: 1
BACK PAIN: 1
BOWEL INCONTINENCE: 0
DIARRHEA: 1
VOMITING: 0
SENSORY CHANGE: 0
CHILLS: 0
MYALGIAS: 1
TINGLING: 0
FEVER: 0
CONSTIPATION: 0
BLOOD IN STOOL: 0
PARESTHESIAS: 0
FOCAL WEAKNESS: 0
NAUSEA: 0

## 2018-11-09 ASSESSMENT — PAIN SCALES - GENERAL: PAINLEVEL: 9=SEVERE PAIN

## 2018-11-09 NOTE — LETTER
November 9, 2018         Patient: Eddie Daniel   YOB: 1990   Date of Visit: 11/9/2018           To Whom it May Concern:    Eddie Daniel was seen in my clinic on 11/9/2018. He may return to work on 11/17/2018.    If you have any questions or concerns, please don't hesitate to call.        Sincerely,           Shalini Jalloh P.A.-C.  Electronically Signed
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
## 2018-11-20 ENCOUNTER — APPOINTMENT (OUTPATIENT)
Dept: RADIOLOGY | Facility: IMAGING CENTER | Age: 28
End: 2018-11-20
Attending: PHYSICIAN ASSISTANT
Payer: COMMERCIAL

## 2018-11-20 ENCOUNTER — OFFICE VISIT (OUTPATIENT)
Dept: URGENT CARE | Facility: CLINIC | Age: 28
End: 2018-11-20
Payer: COMMERCIAL

## 2018-11-20 VITALS
TEMPERATURE: 98.9 F | WEIGHT: 199 LBS | SYSTOLIC BLOOD PRESSURE: 116 MMHG | DIASTOLIC BLOOD PRESSURE: 70 MMHG | HEIGHT: 67 IN | BODY MASS INDEX: 31.23 KG/M2 | OXYGEN SATURATION: 96 % | HEART RATE: 79 BPM

## 2018-11-20 DIAGNOSIS — S49.91XA INJURY OF RIGHT SHOULDER, INITIAL ENCOUNTER: ICD-10-CM

## 2018-11-20 PROCEDURE — 99214 OFFICE O/P EST MOD 30 MIN: CPT | Performed by: PHYSICIAN ASSISTANT

## 2018-11-20 PROCEDURE — 73080 X-RAY EXAM OF ELBOW: CPT | Mod: TC,RT | Performed by: PHYSICIAN ASSISTANT

## 2018-11-20 PROCEDURE — 73030 X-RAY EXAM OF SHOULDER: CPT | Mod: TC,RT | Performed by: PHYSICIAN ASSISTANT

## 2018-11-20 PROCEDURE — 73060 X-RAY EXAM OF HUMERUS: CPT | Mod: TC,RT | Performed by: PHYSICIAN ASSISTANT

## 2018-11-20 RX ORDER — OXYCODONE HYDROCHLORIDE AND ACETAMINOPHEN 5; 325 MG/1; MG/1
1 TABLET ORAL EVERY 8 HOURS PRN
Qty: 12 TAB | Refills: 0 | Status: SHIPPED | OUTPATIENT
Start: 2018-11-20 | End: 2018-11-24

## 2018-11-20 ASSESSMENT — ENCOUNTER SYMPTOMS
NAUSEA: 0
TINGLING: 1
MUSCLE WEAKNESS: 0
FOCAL WEAKNESS: 0
FEVER: 0
MYALGIAS: 1
VOMITING: 0
NUMBNESS: 0
SENSORY CHANGE: 0
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
## 2018-11-25 ENCOUNTER — HOSPITAL ENCOUNTER (OUTPATIENT)
Dept: RADIOLOGY | Facility: MEDICAL CENTER | Age: 28
End: 2018-11-25
Attending: PHYSICIAN ASSISTANT
Payer: COMMERCIAL

## 2018-11-25 DIAGNOSIS — S49.91XA INJURY OF RIGHT SHOULDER, INITIAL ENCOUNTER: ICD-10-CM

## 2018-11-25 PROCEDURE — 73221 MRI JOINT UPR EXTREM W/O DYE: CPT | Mod: RT

## 2018-11-27 ENCOUNTER — TELEPHONE (OUTPATIENT)
Dept: URGENT CARE | Facility: MEDICAL CENTER | Age: 28
End: 2018-11-27

## 2018-11-27 DIAGNOSIS — S49.91XA INJURY OF RIGHT SHOULDER, INITIAL ENCOUNTER: ICD-10-CM

## 2018-11-27 DIAGNOSIS — S46.001A INJURY OF RIGHT ROTATOR CUFF, INITIAL ENCOUNTER: ICD-10-CM

## 2018-11-27 NOTE — TELEPHONE ENCOUNTER
Discussed patient's MRI with patient. Patient needs Ortho evaluation. Urgent referral placed. Patient verbalized understanding.    Sharyn Martell P.A.-C.     Patient/Caregiver provided printed discharge information.

## 2018-12-26 ENCOUNTER — PHYSICAL THERAPY (OUTPATIENT)
Dept: PHYSICAL THERAPY | Facility: REHABILITATION | Age: 28
End: 2018-12-26
Attending: ORTHOPAEDIC SURGERY
Payer: COMMERCIAL

## 2018-12-26 DIAGNOSIS — S43.014A: ICD-10-CM

## 2018-12-26 PROCEDURE — 97162 PT EVAL MOD COMPLEX 30 MIN: CPT

## 2018-12-26 PROCEDURE — 97110 THERAPEUTIC EXERCISES: CPT

## 2018-12-26 ASSESSMENT — ENCOUNTER SYMPTOMS
ALLEVIATING FACTORS: COLD/ICE
ALLEVIATING FACTORS: REST
PAIN TIMING: CONSTANT
QUALITY: SHARP
EXACERBATED BY: ACTIVITY
PAIN SCALE: 8
PAIN SCALE AT LOWEST: 6
PAIN SCALE AT HIGHEST: 10
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
## 2018-12-28 ENCOUNTER — PHYSICAL THERAPY (OUTPATIENT)
Dept: PHYSICAL THERAPY | Facility: REHABILITATION | Age: 28
End: 2018-12-28
Attending: ORTHOPAEDIC SURGERY
Payer: COMMERCIAL

## 2018-12-28 DIAGNOSIS — S43.014A: ICD-10-CM

## 2018-12-28 PROCEDURE — 97140 MANUAL THERAPY 1/> REGIONS: CPT

## 2018-12-28 NOTE — OP THERAPY DAILY TREATMENT
"  Outpatient Physical Therapy  DAILY TREATMENT     [facility] Physical [address].  [address]  [place] [address]  Phone:  [phone]  Fax:  [phone]    Date: 12/28/2018    Patient: Eddie Daniel  YOB: 1990  MRN: [id number]     Time Calculation  Start time: 1027  Stop time: 1053 Time Calculation (min): 26 minutes     Chief Complaint: Shoulder Injury    Visit #: 2    SUBJECTIVE:  It's \"just sore\".  Doing HEP daily.    OBJECTIVE:  Improved PROM of R shoulder flexion and ER.  Improved scapular AROM.          Therapeutic Treatments and Modalities:     1. Manual Therapy (CPT 92397), R shoulder and scapula    Therapeutic Treatment and Modalities Summary: PROM R shoulder flexion, scaption, IR/ER, and elbow flex/ext.  STM to R distal pecs, LS, UT, supraspinatus.  Gentle R GHJ circles, Gr I.  Sidelying R scapular mobs (upward rot, abd/add, elev/depression).      Time-based treatments/modalities:  Manual therapy minutes (CPT 58000): 26 minutes     ASSESSMENT:   Response to treatment: Pt demo decreased guarding and allows increased R shoulder PROM.  Still resistant to R scapular mobility, mariano upward rotation PROM/ joint mobs.    PLAN/RECOMMENDATIONS:   Plan for treatment: therapy treatment to continue next visit.  Planned interventions for next visit: continue with current treatment.      "

## 2019-01-02 ENCOUNTER — PHYSICAL THERAPY (OUTPATIENT)
Dept: PHYSICAL THERAPY | Facility: REHABILITATION | Age: 29
End: 2019-01-02
Attending: ORTHOPAEDIC SURGERY
Payer: COMMERCIAL

## 2019-01-02 DIAGNOSIS — S43.014A: ICD-10-CM

## 2019-01-02 PROCEDURE — 97140 MANUAL THERAPY 1/> REGIONS: CPT

## 2019-01-02 NOTE — OP THERAPY DAILY TREATMENT
Outpatient Physical Therapy  DAILY TREATMENT     [facility] Physical [address].  [address]  [place] [address]  Phone:  [phone]  Fax:  [phone]    Date: 01/02/2019    Patient: Eddie Daniel  YOB: 1990  MRN: [id number]     Time Calculation  Start time: 0930  Stop time: 1000 Time Calculation (min): 30 minutes     Chief Complaint: Shoulder Problem    Visit #: 3    SUBJECTIVE:  Feeling sore sometimes.    OBJECTIVE:  Current objective measures: Supine PROM: flexion= 56 deg, ER= 13deg.          Therapeutic Treatments and Modalities:     1. Manual Therapy (CPT 15372), R shoulder and scapula    Therapeutic Treatment and Modalities Summary: PROM R shoulder flexion, scaption, IR/ER, and elbow flex/ext.  STM to R lat deltoid, LS, UT, supraspinatus.  Gentle R GHJ circles, distraction with inf mobs, post-lat mobs, GR I-II.  Sidelying R scapular mobs (upward rot, abd/add, elev/depression).    Time-based treatments/modalities:  Manual therapy minutes (CPT 42903): 30 minutes       ASSESSMENT:   Response to treatment: Improving GHJ and scap mobility and nguyễn to pasive movement.    PLAN/RECOMMENDATIONS:   Plan for treatment: therapy treatment to continue next visit.  Planned interventions for next visit: continue with current treatment.

## 2019-01-04 ENCOUNTER — PHYSICAL THERAPY (OUTPATIENT)
Dept: PHYSICAL THERAPY | Facility: REHABILITATION | Age: 29
End: 2019-01-04
Attending: ORTHOPAEDIC SURGERY
Payer: COMMERCIAL

## 2019-01-04 DIAGNOSIS — S43.014A: ICD-10-CM

## 2019-01-04 PROCEDURE — 97140 MANUAL THERAPY 1/> REGIONS: CPT

## 2019-01-04 NOTE — OP THERAPY DAILY TREATMENT
Outpatient Physical Therapy  DAILY TREATMENT     [facility] Physical [address].  [address]  [place] [address]  Phone:  [phone]  Fax:  [phone]    Date: 01/04/2019    Patient: Eddie Daniel  YOB: 1990  MRN: [id number]     Time Calculation  Start time: 1030  Stop time: 1107 Time Calculation (min): 37 minutes     Chief Complaint: Shoulder Injury    Visit #: 4    SUBJECTIVE:  Pt reports shoulder continues to be sore.  Pt reports his girlfriend is helping with passive shoulder flexion, 3x 15 sec hold daily per ortho physician's instructions.    OBJECTIVE:        Therapeutic Treatments and Modalities:     1. Manual Therapy (CPT 42893), R shoulder and scapula    Therapeutic Treatment and Modalities Summary: PROM R shoulder flexion, scaption, IR/ER.  STM to R lat deltoid, LS, UT, prox bicep, distal pec.  Gentle R GHJ circles, distraction with inf mobs, post-lat mobs, GR I-II.  Sidelying R scapular mobs (upward rot, abd/add, elev/depression).  Ice applied to R shoulder/scap area following tx, x10 min.    Time-based treatments/modalities:  Manual therapy minutes (CPT 56031): 27 minutes       ASSESSMENT:   Response to treatment: Continues to demonstrate increased shoulder PROM and joint mobility.     PLAN/RECOMMENDATIONS:   Plan for treatment: therapy treatment to continue next visit.  Planned interventions for next visit: continue with current treatment.

## 2019-01-09 ENCOUNTER — PHYSICAL THERAPY (OUTPATIENT)
Dept: PHYSICAL THERAPY | Facility: REHABILITATION | Age: 29
End: 2019-01-09
Attending: ORTHOPAEDIC SURGERY
Payer: COMMERCIAL

## 2019-01-09 DIAGNOSIS — S43.014A: ICD-10-CM

## 2019-01-09 PROCEDURE — 97140 MANUAL THERAPY 1/> REGIONS: CPT

## 2019-01-09 NOTE — OP THERAPY DAILY TREATMENT
Outpatient Physical Therapy  DAILY TREATMENT     [facility] Physical [address].  [address]  [place] [address]  Phone:  [phone]  Fax:  [phone]    Date: 01/09/2019    Patient: Eddie Daniel  YOB: 1990  MRN: [id number]     Time Calculation  Start time: 1127  Stop time: 1157 Time Calculation (min): 30 minutes     Chief Complaint: Shoulder Injury    Visit #: 5    SUBJECTIVE:  Pt reports no change in symptoms.  Often has difficulty sleeping due to shoulder and sling.  Used his girlfriend's pregnancy pillow last night and slept better.     OBJECTIVE:  Current objective measures: R shoulder PROM in supine: flexion= 77, ER= 18 deg.          Therapeutic Treatments and Modalities:     1. Manual Therapy (CPT 28216), R shoulder and scapula    Therapeutic Treatment and Modalities Summary: PROM R shoulder flexion, scaption, IR/ER.  STM to R lat deltoid, LS, UT.  Gentle R GHJ circles, distraction with inf mobs, post-lat mobs, GR I-II.  Passive scaption/flexion with post GHJ mob Gr I.  Sidelying R scapular mobs (upward rot, abd/add, elev/depression).  Ice applied to R shoulder/scap area following tx, x10 min.    Time-based treatments/modalities:  Manual therapy minutes (CPT 76963): 21 minutes     Pain rating after treatment: 5    ASSESSMENT:   Response to treatment: Improving joint mobility, increased PROM, and decreased muscle guarding.     PLAN/RECOMMENDATIONS:   Plan for treatment: therapy treatment to continue next visit.  Planned interventions for next visit: continue with current treatment.

## 2019-01-11 ENCOUNTER — PHYSICAL THERAPY (OUTPATIENT)
Dept: PHYSICAL THERAPY | Facility: REHABILITATION | Age: 29
End: 2019-01-11
Attending: ORTHOPAEDIC SURGERY
Payer: COMMERCIAL

## 2019-01-11 DIAGNOSIS — S43.014A: ICD-10-CM

## 2019-01-11 PROCEDURE — 97140 MANUAL THERAPY 1/> REGIONS: CPT

## 2019-01-11 NOTE — OP THERAPY DAILY TREATMENT
Outpatient Physical Therapy  DAILY TREATMENT     [facility] Physical [address].  [address]  [place] [address]  Phone:  [phone]  Fax:  [phone]    Date: 01/11/2019    Patient: Eddie Daniel  YOB: 1990  MRN: [id number]     Time Calculation  Start time: 1131  Stop time: 1210 Time Calculation (min): 39 minutes     Chief Complaint: Shoulder Injury    Visit #: 6    SUBJECTIVE:  Neck sometimes gets painful or sore due to R arm sling.  Also reports top of R hand sometimes goes numb.    OBJECTIVE:        Therapeutic Treatments and Modalities:     1. Manual Therapy (CPT 14044), R shoulder and scapula    Therapeutic Treatment and Modalities Summary: PROM R shoulder flexion, scaption, IR/ER.  STM to R LS, UT, upper T/S paraspinals, supraspinatus.  Gentle R GHJ circles, distraction with inf mobs, post-lat mobs, GR II.   Sidelying R scapular mobs (upward rot, abd/add, elev/depression).  Ice applied to R shoulder/scap area following tx, x10 min.    Time-based treatments/modalities:  Manual therapy minutes (CPT 57286): 30 minutes       ASSESSMENT:   Response to treatment: Pt nguyễn man there without pain.    PLAN/RECOMMENDATIONS:   Plan for treatment: therapy treatment to continue next visit.  Planned interventions for next visit: continue with current treatment.

## 2019-01-16 ENCOUNTER — PHYSICAL THERAPY (OUTPATIENT)
Dept: PHYSICAL THERAPY | Facility: REHABILITATION | Age: 29
End: 2019-01-16
Attending: ORTHOPAEDIC SURGERY
Payer: COMMERCIAL

## 2019-01-16 DIAGNOSIS — S43.014A: ICD-10-CM

## 2019-01-16 PROCEDURE — 97140 MANUAL THERAPY 1/> REGIONS: CPT

## 2019-01-16 NOTE — OP THERAPY DAILY TREATMENT
Outpatient Physical Therapy  DAILY TREATMENT     [facility] Physical [address]  [address]  [place] [address]  Phone:  [phone]  Fax:  [phone]    Date: 01/16/2019    Patient: Eddie Daniel  YOB: 1990  MRN: [id number]     Time Calculation  Start time: 0955  Stop time: 1034 Time Calculation (min): 39 minutes     Chief Complaint: Shoulder Injury    Visit #: 7    SUBJECTIVE:  Pt reports increased soreness today, difficulty sleeping last night.    OBJECTIVE:        Therapeutic Treatments and Modalities:     1. Manual Therapy (CPT 90831), R shoulder and scapula    Therapeutic Treatment and Modalities Summary: PROM R shoulder flexion, scaption, IR/ER.  STM to R LS, UT, RC tendon and supraspinatus  Gentle R GHJ circles, distraction with inf mobs, post-lat mobs, GR II.   Sidelying R scapular mobs (upward rot, abd/add, elev/depression).  Ice applied to R shoulder/scap area following tx, x10 min.    Time-based treatments/modalities:  Manual therapy minutes (CPT 10571): 25 minutes       ASSESSMENT:   Response to treatment: Pt demo limited PROM today due to pain and guarding, less supple tissues surrounding shoulder.    PLAN/RECOMMENDATIONS:   Plan for treatment: therapy treatment to continue next visit.  Planned interventions for next visit: continue with current treatment.

## 2019-01-18 ENCOUNTER — PHYSICAL THERAPY (OUTPATIENT)
Dept: PHYSICAL THERAPY | Facility: REHABILITATION | Age: 29
End: 2019-01-18
Attending: ORTHOPAEDIC SURGERY
Payer: COMMERCIAL

## 2019-01-18 DIAGNOSIS — S43.014A: ICD-10-CM

## 2019-01-18 PROCEDURE — 97140 MANUAL THERAPY 1/> REGIONS: CPT

## 2019-01-18 NOTE — OP THERAPY DAILY TREATMENT
Outpatient Physical Therapy  DAILY TREATMENT     [facility] Physical [address].  [address]  [place] [address]  Phone:  [phone]  Fax:  [phone]    Date: 01/18/2019    Patient: Eddie Daniel  YOB: 1990  MRN: [id number]     Time Calculation  Start time: 1000  Stop time: 1037 Time Calculation (min): 37 minutes     Chief Complaint: Shoulder Injury    Visit #: 8    SUBJECTIVE:  Shoulder was more sore yesterday, hasn't been sleeping well this week but slept better last night.    OBJECTIVE:        Therapeutic Treatments and Modalities:     1. Manual Therapy (CPT 13884), R shoulder and scapula    Therapeutic Treatment and Modalities Summary: PROM R shoulder flexion, scaption,abd, IR/ER.  STM to R LS, UT, RC tendon, distal pec and supraspinatus  Gentle R GHJ circles, distraction with inf mobs, post-lat mobs, GR II.   Sidelying R scapular mobs (upward rot, abd/add, elev/depression).  Ice applied to R shoulder/scap area following tx, x10 min.    Time-based treatments/modalities:  Manual therapy minutes (CPT 44356): 25 minutes         ASSESSMENT:   Response to treatment: Pt demo less muscle guarding than last visit, able to achieve greater PROM today.    PLAN/RECOMMENDATIONS:   Plan for treatment: therapy treatment to continue next visit.  Planned interventions for next visit: continue with current treatment.

## 2019-01-21 ENCOUNTER — OCCUPATIONAL MEDICINE (OUTPATIENT)
Dept: URGENT CARE | Facility: CLINIC | Age: 29
End: 2019-01-21
Payer: COMMERCIAL

## 2019-01-21 ENCOUNTER — APPOINTMENT (OUTPATIENT)
Dept: RADIOLOGY | Facility: IMAGING CENTER | Age: 29
End: 2019-01-21
Attending: EMERGENCY MEDICINE
Payer: COMMERCIAL

## 2019-01-21 VITALS
SYSTOLIC BLOOD PRESSURE: 108 MMHG | HEIGHT: 67 IN | TEMPERATURE: 97.8 F | RESPIRATION RATE: 14 BRPM | OXYGEN SATURATION: 99 % | HEART RATE: 68 BPM | WEIGHT: 199 LBS | BODY MASS INDEX: 31.23 KG/M2 | DIASTOLIC BLOOD PRESSURE: 68 MMHG

## 2019-01-21 DIAGNOSIS — S69.91XA HAND INJURY, RIGHT, INITIAL ENCOUNTER: ICD-10-CM

## 2019-01-21 DIAGNOSIS — T07.XXXA ABRASION, MULTIPLE SITES: ICD-10-CM

## 2019-01-21 DIAGNOSIS — S66.911A STRAIN OF RIGHT HAND, INITIAL ENCOUNTER: ICD-10-CM

## 2019-01-21 DIAGNOSIS — Z98.890 HISTORY OF SHOULDER SURGERY: ICD-10-CM

## 2019-01-21 DIAGNOSIS — T07.XXXA CONTUSION, MULTIPLE SITES: ICD-10-CM

## 2019-01-21 LAB
AMP AMPHETAMINE: NORMAL
BAR BARBITURATES: NORMAL
BREATH ALCOHOL COMMENT: NORMAL
BZO BENZODIAZEPINES: NORMAL
COC COCAINE: NORMAL
INT CON NEG: NORMAL
INT CON POS: NORMAL
MDMA ECSTASY: NORMAL
MET METHAMPHETAMINES: NORMAL
MTD METHADONE: NORMAL
OPI OPIATES: NORMAL
OXY OXYCODONE: NORMAL
PCP PHENCYCLIDINE: NORMAL
POC BREATHALIZER: 0 PERCENT (ref 0–0.01)
POC URINE DRUG SCREEN OCDRS: NEGATIVE
THC: NORMAL

## 2019-01-21 PROCEDURE — 80305 DRUG TEST PRSMV DIR OPT OBS: CPT | Performed by: EMERGENCY MEDICINE

## 2019-01-21 PROCEDURE — 99214 OFFICE O/P EST MOD 30 MIN: CPT | Performed by: EMERGENCY MEDICINE

## 2019-01-21 PROCEDURE — 73130 X-RAY EXAM OF HAND: CPT | Mod: 26,RT | Performed by: EMERGENCY MEDICINE

## 2019-01-21 PROCEDURE — 82075 ASSAY OF BREATH ETHANOL: CPT | Performed by: EMERGENCY MEDICINE

## 2019-01-21 RX ORDER — OXYCODONE AND ACETAMINOPHEN 7.5; 325 MG/1; MG/1
TABLET ORAL
COMMUNITY
Start: 2018-12-21 | End: 2019-04-05

## 2019-01-21 RX ORDER — TRAMADOL HYDROCHLORIDE 50 MG/1
TABLET ORAL
COMMUNITY
Start: 2018-12-21 | End: 2019-04-05

## 2019-01-21 ASSESSMENT — ENCOUNTER SYMPTOMS
BACK PAIN: 0
NECK PAIN: 0

## 2019-01-21 NOTE — LETTER
[facility] [place] [address] [facility] [address] [place] [address]  Phone:  [phone] - Fax:  [phone]   [facility] Progress Report and Disability Certification  Date of Service: 1/21/2019   No Show:  No  Date / Time of Next Visit:  1/25/2019@8:40am   Claim Information   Patient Name: Eddie Daniel  Claim Number:     Employer: [employer]  Date of Injury: 1/18/2019     Insurer / TPA: [employer]  ID / SSN:     Occupation: Transport Distribuition Specialist   Diagnosis: Diagnoses of Strain of right hand, initial encounter, Contusion, multiple sites, Abrasion, multiple sites, Hand injury, right, initial encounter, and History of shoulder surgery were pertinent to this visit.    Medical Information   Related to Industrial Injury? Yes    Subjective Complaints:  Date of injury: 01/18/2019. Injured at work: yes; states tripped and fell over pallet. Previous injury: Right shoulder, right knee. Second job: none active. Outside activity: none. Contributing medical illness: Chronic right ankle instability, chronic right knee instability, prior right hand fractures, recent right shoulder surgery. Severity: Improving, continued right hand pain. Prior treatment:  Ice. Location: right hand, right lower leg, right knee, left hip, left lower leg. Radiation: none. Numbness/tingling: none. Focal weakness: none.  Fever: none.  Specifically notes no increase in right shoulder pain from postoperative baseline.   Objective Findings: General: Alert, cooperative, no acute distress.  Neck: Supple, normal range of motion, no focal tenderness.  Musculoskeletal-right shoulder: Mild tenderness scapular, AC joint region.  Limited range of motion postoperatively; no subjective increased tenderness.  Right hand: Tenderness dorsal ulnar region diffusely.  Decreased range of motion secondary to pain.  No focal tendon function deficit.  Right wrist, forearm elbow nontender.  Right knee: Minimal  anterolateral tenderness, no effusion, intact range of motion, no gross instability.  Right lower leg: Tenderness, mild swelling, bruising right medial calf region.  Right ankle: Mild tenderness, swelling distal anterolateral.  Intact range of motion, no gross instability.  Left hip: Minimal tenderness greater trochanter region.  Left lower leg: Minimal tenderness left anterior shin region.  Neurological: Distal light touch sensation symmetrically intact, no focal motor deficit.  Vascular: Normal distal capillary refill symmetrically.  Skin: Warm, dry.  Superficial abrasions right anterior knee, right medial lower leg, left shin; no evidence of infection or foreign body.   Pre-Existing Condition(s):     Assessment:   Initial Visit    Status: Additional Care Required  Permanent Disability:No    Plan: Medication    Diagnostics: X-ray    Comments:       Disability Information   Status: Released to Restricted Duty    From:     Through:   Restrictions are:     Physical Restrictions   Sitting:    Standing:    Stooping:    Bending:      Squatting:    Walking:    Climbing:    Pushing:      Pulling:    Other:    Reaching Above Shoulder (L):   Reaching Above Shoulder (R): 0 hrs/day     Reaching Below Shoulder (L):    Reaching Below Shoulder (R):  0 hrs/day   Not to exceed Weight Limits   Carrying(hrs):   Weight Limit(lb): < or = to 10 pounds Lifting(hrs):   Weight  Limit(lb): < or = to 10 pounds   Comments:      Repetitive Actions   Hands: i.e. Fine Manipulations from Grasping: < or = to 2 hrs/day   Feet: i.e. Operating Foot Controls:     Driving / Operate Machinery:     Physician Name: Gurwinder Van M.D. Physician Signature: GURWINDER Brown M.D. e-Signature: Dr. Brannon Strong, Medical Director   Clinic Name / Location: [address] [address] Clinic Phone Number: Dept: [phone]   Appointment Time: 10:00 Am Visit Start Time: 10:31 AM   Check-In Time:  10:10 Am Visit  Discharge Time:  11:26am   Original-Treating Physician or Chiropractor    Page 2-Insurer/TPA    Page 3-Employer    Page 4-Employee

## 2019-01-21 NOTE — PATIENT INSTRUCTIONS
Contusion  Introduction  A contusion is a deep bruise. Contusions happen when an injury causes bleeding under the skin. Symptoms of bruising include pain, swelling, and discolored skin. The skin may turn blue, purple, or yellow.  Follow these instructions at home:  · Rest the injured area.  · If told, put ice on the injured area.  ¨ Put ice in a plastic bag.  ¨ Place a towel between your skin and the bag.  ¨ Leave the ice on for 20 minutes, 2-3 times per day.  · If told, put light pressure (compression) on the injured area using an elastic bandage. Make sure the bandage is not too tight. Remove it and put it back on as told by your doctor.  · If possible, raise (elevate) the injured area above the level of your heart while you are sitting or lying down.  · Take over-the-counter and prescription medicines only as told by your doctor.  Contact a doctor if:  · Your symptoms do not get better after several days of treatment.  · Your symptoms get worse.  · You have trouble moving the injured area.  Get help right away if:  · You have very bad pain.  · You have a loss of feeling (numbness) in a hand or foot.  · Your hand or foot turns pale or cold.  This information is not intended to replace advice given to you by your health care provider. Make sure you discuss any questions you have with your health care provider.  Document Released: 06/05/2009 Document Revised: 05/25/2017 Document Reviewed: 05/04/2016  © 2017 [employer]
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
## 2019-01-21 NOTE — LETTER
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    Medical Information   Related to Industrial Injury? Yes    Subjective Complaints:  Date of injury: 01/18/2019. Injured at work: yes; states tripped and fell over pallet. Previous injury: Right shoulder, right knee. Second job: none active. Outside activity: none. Contributing medical illness: Chronic right ankle instability, chronic right knee instability, prior right hand fractures, recent right shoulder surgery. Severity: Improving, continued right hand pain. Prior treatment:  Ice. Location: right hand, right lower leg, right knee, left hip, left lower leg. Radiation: none. Numbness/tingling: none. Focal weakness: none.  Fever: none.  Specifically notes no increase in right shoulder pain from postoperative baseline.   Objective Findings: General: Alert, cooperative, no acute distress.  Neck: Supple, normal range of motion, no focal tenderness.  Musculoskeletal-right shoulder: Mild tenderness scapular, AC joint region.  Limited range of motion postoperatively; no subjective increased tenderness.  Right hand: Tenderness dorsal ulnar region diffusely.  Decreased range of motion secondary to pain.  No focal tendon function deficit.  Right wrist, forearm elbow nontender.  Right knee: Minimal anterolateral  tenderness, no effusion, intact range of motion, no gross instability.  Right lower leg: Tenderness, mild swelling, bruising right medial calf region.  Right ankle: Mild tenderness, swelling distal anterolateral.  Intact range of motion, no gross instability.  Left hip: Minimal tenderness greater trochanter region.  Left lower leg: Minimal tenderness left anterior shin region.  Neurological: Distal light touch sensation symmetrically intact, no focal motor deficit.  Vascular: Normal distal capillary refill symmetrically.  Skin: Warm, dry.  Superficial abrasions right anterior knee, right medial lower leg, left shin; no evidence of infection or foreign body.   Pre-Existing Condition(s):     Assessment:   Initial Visit    Status: Additional Care Required  Permanent Disability:No    Plan: Medication    Diagnostics: X-ray    Comments:       Disability Information   Status: Released to Restricted Duty    From:  1/21/2019  Through: 1/25/2019 Restrictions are: Temporary   Physical Restrictions   Sitting:    Standing:    Stooping:    Bending:      Squatting:    Walking:    Climbing:    Pushing:      Pulling:    Other:    Comments:Must wear right hand splint, clean and dry Reaching Above Shoulder (L):   Reaching Above Shoulder (R): 0 hrs/day     Reaching Below Shoulder (L):    Reaching Below Shoulder (R):  0 hrs/day   Not to exceed Weight Limits   Carrying(hrs):   Weight Limit(lb): < or = to 10 pounds Lifting(hrs):   Weight  Limit(lb): < or = to 10 pounds   Comments:      Repetitive Actions   Hands: i.e. Fine Manipulations from Grasping: < or = to 2 hrs/day   Feet: i.e. Operating Foot Controls:     Driving / Operate Machinery:     Physician Name: Gurwinder Van M.D. Physician Signature: GURWINDER Brown M.D. e-Signature: Dr. Brannon Strong, Medical Director   Clinic Name / Location: [address] [place] [address], [place] [address] Clinic Phone Number: Dept: [phone]   Appointment Time: 10:00 Am  Visit Start Time: 10:31 AM   Check-In Time:  10:10 Am Visit Discharge Time: 11:08 Am    Original-Treating Physician or Chiropractor    Page 2-Insurer/TPA    Page 3-Employer    Page 4-Employee

## 2019-01-21 NOTE — LETTER
[facility] [place] [id number] 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    Medical Information   Related to Industrial Injury? Yes    Subjective Complaints:  Date of injury: 01/18/2019. Injured at work: yes; states tripped and fell over pallet. Previous injury: Right shoulder, right knee. Second job: none active. Outside activity: none. Contributing medical illness: Chronic right ankle instability, chronic right knee instability, prior right hand fractures, recent right shoulder surgery. Severity: Improving, continued right hand pain. Prior treatment:  Ice. Location: right hand, right lower leg, right knee, left hip, left lower leg. Radiation: none. Numbness/tingling: none. Focal weakness: none.  Fever: none.  Specifically notes no increase in right shoulder pain from postoperative baseline.   Objective Findings: General: Alert, cooperative, no acute distress.  Neck: Supple, normal range of motion, no focal tenderness.  Musculoskeletal-right shoulder: Mild tenderness scapular, AC joint region.  Limited range of motion postoperatively; no subjective increased tenderness.  Right hand: Tenderness dorsal ulnar region diffusely.  Decreased range of motion secondary to pain.  No focal tendon function deficit.  Right wrist, forearm elbow nontender.  Right knee: Minimal anterolateral  tenderness, no effusion, intact range of motion, no gross instability.  Right lower leg: Tenderness, mild swelling, bruising right medial calf region.  Right ankle: Mild tenderness, swelling distal anterolateral.  Intact range of motion, no gross instability.  Left hip: Minimal tenderness greater trochanter region.  Left lower leg: Minimal tenderness left anterior shin region.  Neurological: Distal light touch sensation symmetrically intact, no focal motor deficit.  Vascular: Normal distal capillary refill symmetrically.  Skin: Warm, dry.  Superficial abrasions right anterior knee, right medial lower leg, left shin; no evidence of infection or foreign body.   Pre-Existing Condition(s):     Assessment:   Initial Visit    Status: Additional Care Required  Permanent Disability:No    Plan: Medication    Diagnostics: X-ray    Comments:       Disability Information   Status: Released to Restricted Duty    From:  1/21/2019  Through: 1/25/2019 Restrictions are: Temporary   Physical Restrictions   Sitting:    Standing:    Stooping:    Bending:      Squatting:    Walking:    Climbing:    Pushing:      Pulling:    Other:    Comments:Must wear right hand splint, clean and dry Reaching Above Shoulder (L):   Reaching Above Shoulder (R): 0 hrs/day     Reaching Below Shoulder (L):    Reaching Below Shoulder (R):  0 hrs/day   Not to exceed Weight Limits   Carrying(hrs):   Weight Limit(lb): < or = to 10 pounds Lifting(hrs):   Weight  Limit(lb): < or = to 10 pounds   Comments:      Repetitive Actions   Hands: i.e. Fine Manipulations from Grasping: < or = to 2 hrs/day   Feet: i.e. Operating Foot Controls:     Driving / Operate Machinery:     Physician Name: Gurwinder Van M.D. Physician Signature: GURWINDER Brown M.D. e-Signature: Dr. Brannon Strong, Medical Director   Clinic Name / Location: [address] [place] [address], [place] [address] Clinic Phone Number: Dept: [phone]   Appointment Time: 10:00 Am  Visit Start Time: 10:31 AM   Check-In Time:  10:10 Am Visit Discharge Time: 11:08 Am    Original-Treating Physician or Chiropractor    Page 2-Insurer/TPA    Page 3-Employer    Page 4-Employee

## 2019-01-21 NOTE — LETTER
"EMPLOYEE’S CLAIM FOR COMPENSATION/ REPORT OF INITIAL TREATMENT  FORM C-4    EMPLOYEE’S CLAIM - PROVIDE ALL INFORMATION REQUESTED   First Name  Eddie Last Name  Chick Birthdate                    1990                Sex  male Claim Number   Home Address  [address] Age  28 y.o. 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 (if applicable)  Working     How did this injury or occupational disease occur? (Be specific an answer in detail. Use additional sheet if necessary)  I went to [employer] around & Tripped over pallet, hit Right shoulder, r arm, r leg, left hip, left ankle  L knee, left arm and hand   If you believe that you have an occupational disease, when did you first have knowledge of the disability and it relationship to your employment?  Na Witnesses to the Accident  Hoang Gaytan Garreson      Nature of Injury or Occupational Disease  Sprain  Part(s) of Body Injured or Affected  Shoulder (R), Wrist (R) and Hand (R), Wrist (L) and Hand (L)    I certify that the above is true and correct to the best of my knowledge and that I have " provided this information in order to obtain the benefits of [place] Chapter 617 [place]  I hereby authorize any physician, chiropractor, surgeon, practitioner, or other person, any hospital, including [facility] or [facility] hospital, any medical service organization, any insurance company, or other institution or organization to release to each other, any medical or other information, including benefits paid or payable, pertinent to this injury or disease, except information relative to diagnosis, treatment and/or counseling for AIDS, psychological conditions, alcohol or controlled substances, for which I must give specific authorization.  A Photostat of this authorization shall be as valid as the original.     Date   Place   Employee’s Signature   THIS REPORT MUST BE COMPLETED AND MAILED WITHIN 3 WORKING DAYS OF TREATMENT   Place  [facility]  Name of [facility]   Date  1/21/2019 Diagnosis  (S66.911A) Strain of right hand, initial encounter  (T07.XXXA) Contusion, multiple sites  (T07.XXXA) Abrasion, multiple sites  (S69.91XA) Hand injury, right, initial encounter  (Z98.890) History of shoulder surgery Is there evidence the injured employee was under the influence of alcohol and/or another controlled substance at the time of accident?   Hour  10:31 AM Description of Injury or Disease  Diagnoses of Strain of right hand, initial encounter, Contusion, multiple sites, Abrasion, multiple sites, Hand injury, right, initial encounter, and History of shoulder surgery were pertinent to this visit. No   Treatment  Ice, OTC analgesia PRN. Right hand splint  Have you advised the patient to remain off work five days or more? No   X-Ray Findings  Negative   If Yes   From Date  To Date      From information given by the employee, together with medical evidence, can you directly connect this injury or  "occupational disease as job incurred?  Yes If No Full Duty  No Modified Duty  Yes   Is additional medical care by a physician indicated?  Yes If Modified Duty, Specify any Limitations / Restrictions  Limited right arm use due to postoperative status, limited right hand use   Do you know of any previous injury or disease contributing to this condition or occupational disease?                            Yes  Comments:Multiple orthopedic issues, surgery   Date  1/21/2019 Print Doctor’s Name Gurwinder Van M.D. I certify the employer’s copy of  this form was mailed on:   Address  [address] [place] [address] [address] Insurer’s Use Only     [facility] Zip  [address]    Provider’s Tax ID Number  [id number] Telephone  Dept: [phone]        e-GURWINDER Zavala M.D.   e-Signature: Dr. Brannon Strong, Medical Director Degree  MD FLANAGAN-TREATING PHYSICIAN OR CHIROPRACTOR    PAGE 2-INSURER/TPA    PAGE 3-EMPLOYER    PAGE 4-EMPLOYEE             Form C-4 (rev10/07)              BRIEF DESCRIPTION OF RIGHTS AND BENEFITS  (Pursuant to [place]RS 616C.050)    Notice of Injury or Occupational Disease (Incident Report Form C-1): If an injury or occupational disease (OD) arises out of and in the  course of employment, you must provide written notice to your employer as soon as practicable, but no later than 7 days after the accident or  OD. Your employer shall maintain a sufficient supply of the required forms.    Claim for Compensation (Form C-4): If medical treatment is sought, the form C-4 is available at the place of initial treatment. A completed  \"Claim for Compensation\" (Form C-4) must be filed within 90 days after an accident or OD. The treating physician or chiropractor must,  within 3 working days after treatment, complete and mail to the employer, the employer's insurer and third-party , the Claim for  Compensation.    Medical Treatment: If you require medical treatment for your on-the-job injury or OD, you may " be required to select a physician or  chiropractor from a list provided by your workers’ compensation insurer, if it has contracted with an Organization for Managed Care (MCO) or  Preferred Provider Organization (PPO) or providers of health care. If your employer has not entered into a contract with an MCO or PPO, you  may select a physician or chiropractor from the Panel of Physicians and Chiropractors. Any medical costs related to your industrial injury or  OD will be paid by your insurer.    Temporary Total Disability (TTD): If your doctor has certified that you are unable to work for a period of at least 5 consecutive days, or 5  cumulative days in a 20-day period, or places restrictions on you that your employer does not accommodate, you may be entitled to TTD  compensation.    Temporary Partial Disability (TPD): If the wage you receive upon reemployment is less than the compensation for TTD to which you are  entitled, the insurer may be required to pay you TPD compensation to make up the difference. TPD can only be paid for a maximum of 24  months.    Permanent Partial Disability (PPD): When your medical condition is stable and there is an indication of a PPD as a result of your injury or  OD, within 30 days, your insurer must arrange for an evaluation by a rating physician or chiropractor to determine the degree of your PPD. The  amount of your PPD award depends on the date of injury, the results of the PPD evaluation and your age and wage.    Permanent Total Disability (PTD): If you are medically certified by a treating physician or chiropractor as permanently and totally disabled  and have been granted a PTD status by your insurer, you are entitled to receive monthly benefits not to exceed 66 2/3% of your average  monthly wage. The amount of your PTD payments is subject to reduction if you previously received a PPD award.    Vocational Rehabilitation Services: You may be eligible for vocational  rehabilitation services if you are unable to return to the job due to a  permanent physical impairment or permanent restrictions as a result of your injury or occupational disease.    Transportation and Per Johan Reimbursement: You may be eligible for travel expenses and per johan associated with medical treatment.    Reopening: You may be able to reopen your claim if your condition worsens after claim closure.    Appeal Process: If you disagree with a written determination issued by the insurer or the insurer does not respond to your request, you may  appeal to the Department of Administration, , by following the instructions contained in your determination letter. You must  appeal the determination within 70 days from the date of the determination letter at [address], [address], [place], [place]  [address], or [address], [address], [place], [place] [address]. If you disagree with the  decision, you may appeal to the  Department of Administration, . You must file your appeal within 30 days from the date of the  decision  letter at [address], [address], [place], [place] [address], or [address], [address], [place], [place] [address]. If you  disagree with a decision of an , you may file a petition for judicial review with the District Court. You must do so within 30  days of the Appeal Officer’s decision. You may be represented by an  at your own expense or you may contact the [facility] for possible  representation.    [place]  for Injured Workers (NAIW): If you disagree with a  decision, you may request that NAIW represent you  without charge at an  Hearing. For information regarding denial of benefits, you may contact the [facility] at: [address], [address], [place] [address], [phone], or [address], [address], [place] [address],  [phone]    To File a Complaint with the Division: If you wish to file a complaint with the  of the Division of Industrial Relations (DIR),  please contact the Workers’ Compensation Section, [address], [address], [place], [place] [address], telephone [phone]) [phone], or  [address], [address], [place], [place] [address], telephone [phone].    For assistance with Workers’ Compensation Issues: you may contact the Office of the Governor Consumer Health Assistance, [address] [place] [address], [address], [place], [place] [address], Toll Free 1-623.343.2374, Web site: http://[employer].[facility].nv.us, E-mail  [email or web address].                                                                                                                                                                                                                                   __________________________________________________________________                                                                   _________________                Employee Name / Signature                                                                                                                                                       Date                                                                                                                                                                                                     D-2 (rev. 10/07)

## 2019-01-23 ENCOUNTER — PHYSICAL THERAPY (OUTPATIENT)
Dept: PHYSICAL THERAPY | Facility: REHABILITATION | Age: 29
End: 2019-01-23
Attending: ORTHOPAEDIC SURGERY
Payer: COMMERCIAL

## 2019-01-23 DIAGNOSIS — S43.014A: ICD-10-CM

## 2019-01-23 PROCEDURE — 97140 MANUAL THERAPY 1/> REGIONS: CPT

## 2019-01-23 NOTE — OP THERAPY DAILY TREATMENT
Outpatient Physical Therapy  DAILY TREATMENT     [facility] [address]  [address]  [place] [address]  Phone:  [phone]  Fax:  [phone]    Date: 01/23/2019    Patient: Eddie Daniel  YOB: 1990  MRN: [id number]     Time Calculation  Start time: 1528  Stop time: 1552 Time Calculation (min): 24 minutes     Chief Complaint: Shoulder Injury    Visit #: 9    SUBJECTIVE:  Pt tripped and fell on Friday afternoon, states he fell onto R shoulder and hand, then to L hand and hip.  Reports no increased pain or soreness in R shoulder or arm.  When to UC following fall, applied ice, XRay of R hand shows no fracture.    OBJECTIVE:  Current objective measures: R shoulder PROM: flexion to 90.        Therapeutic Treatments and Modalities:     1. Manual Therapy (CPT 71237), R shoulder and scapula    Therapeutic Treatment and Modalities Summary: PROM R shoulder flexion, scaption,abd, IR/ER.  STM to R LS, UT, RC tendon, distal pec and supraspinatus  Gentle R GHJ circles, distraction with inf mobs, post-lat mobs, GR II.   Sidelying R scapular mobs (upward rot, abd/add, elev/depression).  Ice applied to R shoulder/scap area following tx, x10 min.    Time-based treatments/modalities:  Manual therapy minutes (CPT 04662): 24 minutes       ASSESSMENT:   Response to treatment: Gradual improvement in PROM, no acute changes since fall on 1/21.    PLAN/RECOMMENDATIONS:   Plan for treatment: therapy treatment to continue next visit.  Planned interventions for next visit: continue with current treatment.

## 2019-01-25 ENCOUNTER — PHYSICAL THERAPY (OUTPATIENT)
Dept: PHYSICAL THERAPY | Facility: REHABILITATION | Age: 29
End: 2019-01-25
Attending: ORTHOPAEDIC SURGERY
Payer: COMMERCIAL

## 2019-01-25 ENCOUNTER — OCCUPATIONAL MEDICINE (OUTPATIENT)
Dept: URGENT CARE | Facility: CLINIC | Age: 29
End: 2019-01-25
Payer: COMMERCIAL

## 2019-01-25 VITALS
DIASTOLIC BLOOD PRESSURE: 72 MMHG | BODY MASS INDEX: 31.23 KG/M2 | SYSTOLIC BLOOD PRESSURE: 124 MMHG | RESPIRATION RATE: 14 BRPM | WEIGHT: 199 LBS | HEART RATE: 69 BPM | OXYGEN SATURATION: 97 % | HEIGHT: 67 IN | TEMPERATURE: 99.1 F

## 2019-01-25 DIAGNOSIS — S43.014A: ICD-10-CM

## 2019-01-25 DIAGNOSIS — S80.01XD CONTUSION OF RIGHT KNEE, SUBSEQUENT ENCOUNTER: ICD-10-CM

## 2019-01-25 DIAGNOSIS — Z98.890 HISTORY OF SHOULDER SURGERY: ICD-10-CM

## 2019-01-25 DIAGNOSIS — S66.911D STRAIN OF RIGHT HAND, SUBSEQUENT ENCOUNTER: ICD-10-CM

## 2019-01-25 PROCEDURE — 97140 MANUAL THERAPY 1/> REGIONS: CPT

## 2019-01-25 PROCEDURE — 99213 OFFICE O/P EST LOW 20 MIN: CPT | Mod: 29 | Performed by: PHYSICIAN ASSISTANT

## 2019-01-25 RX ORDER — ONDANSETRON 4 MG/1
TABLET, FILM COATED ORAL
COMMUNITY
Start: 2018-12-21 | End: 2019-04-05

## 2019-01-25 ASSESSMENT — ENCOUNTER SYMPTOMS
SENSORY CHANGE: 0
FEVER: 0
PALPITATIONS: 0
BLURRED VISION: 0
SORE THROAT: 0
SHORTNESS OF BREATH: 0
NAUSEA: 0
CHILLS: 0
VOMITING: 0
TINGLING: 0
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
## 2019-01-25 NOTE — OP THERAPY DAILY TREATMENT
Outpatient Physical Therapy  DAILY TREATMENT     [facility] [address].  [address]  [place] [address]  Phone:  [phone]  Fax:  [phone]    Date: 01/25/2019    Patient: Eddie Daniel  YOB: 1990  MRN: [id number]     Time Calculation  Start time: 1102  Stop time: 1127 Time Calculation (min): 25 minutes     Chief Complaint: Shoulder Injury    Visit #: 10    SUBJECTIVE:  Shoulder feels pretty good today.  Hurt more yesterday because he had rolled onto R side for about 10 min when sleeping the night before.    OBJECTIVE        Therapeutic Treatments and Modalities:     1. Manual Therapy (CPT 68845), R shoulder and scapula    Therapeutic Treatment and Modalities Summary: PROM R shoulder flexion, scaption,abd, IR/ER.  STM to R LS, UT, RC tendon, distal pec and supraspinatus  Gentle R GHJ circles, distraction with inf mobs, post-lat mobs, GR II.   Sidelying R scapular mobs (upward rot, abd/add, elev/depression).      Time-based treatments/modalities:  Manual therapy minutes (CPT 19450): 25 minutes         ASSESSMENT:   Response to treatment: Continued use of sling and PROM.    PLAN/RECOMMENDATIONS:   Plan for treatment: therapy treatment to continue next visit.  Planned interventions for next visit: continue with current treatment.

## 2019-01-25 NOTE — LETTER
[facility] [place] [address] [facility] [place] [address]  Phone:  [phone] - Fax:  [phone]   [facility] Progress Report and Disability Certification  Date of Service: 1/25/2019   No Show:  No  Date / Time of Next Visit: 2/4/2019 @ 4pm   Claim Information   Patient Name: Eddie Daniel  Claim Number:     Employer: [employer]  Date of Injury: 1/18/2019     Insurer / TPA: [employer]  ID / SSN:     Occupation: Transport Distribuition Specialist   Diagnosis: Diagnoses of Strain of right hand, subsequent encounter, Contusion of right knee, subsequent encounter, and History of shoulder surgery were pertinent to this visit.    Medical Information   Related to Industrial Injury? Yes    Subjective Complaints:  DOI 1/18/2019: Patient was at work when he tripped over a pallet and fell to the ground landing on his right side.  He had previously injured his right shoulder surgery in December.  When he fell his shoulder was in a sling immobilizer.  He reported that he had pain in his right hand and right knee after the fall.  He says he has a history of chronic right knee instability and has had prior right hand fractures.  At his last appointment he was given an ulnar gutter splint for his hand despite negative x-ray findings but he says that he ripped it off a day or 2 later because the shoulder sling that he was wearing was causing the splint to get dug into his arm.  He states that he has not been applying any ice to his hand or his knee and has just been taking OTC NSAIDs occasionally.  He still denies any increase in right shoulder pain or decrease in right shoulder range of motion from his postop baseline.  He denies any new injury since his last visit.  He denies numbness/tingling.     Objective Findings: Right shoulder: He exhibits decreased range of motion (Limited ROM postoperatively) and tenderness (Mild TTP over AC joint).        Right knee: He exhibits  normal range of motion, no swelling, no effusion, no ecchymosis, no deformity and no erythema. Tenderness (Minimal TTP at the superior lateral aspect of the patella) found.        Right hand: He exhibits tenderness. He exhibits normal range of motion. Normal sensation noted. Normal strength noted.     N/V intact. Sensation intact   Pre-Existing Condition(s):     Assessment:   Condition Improved    Status: Additional Care Required  Permanent Disability:No    Plan:      Diagnostics:      Comments:       Disability Information   Status: Released to Restricted Duty    From:  1/25/2019  Through: 2/4/2019 Restrictions are: Temporary   Physical Restrictions   Sitting:    Standing:    Stooping:    Bending:      Squatting:    Walking:    Climbing:    Pushing:      Pulling:    Other:    Reaching Above Shoulder (L):   Reaching Above Shoulder (R):       Reaching Below Shoulder (L):    Reaching Below Shoulder (R):  0 hrs/day   Not to exceed Weight Limits   Carrying(hrs):   Weight Limit(lb): < or = to 10 pounds Lifting(hrs):   Weight  Limit(lb): < or = to 10 pounds   Comments:      Repetitive Actions   Hands: i.e. Fine Manipulations from Grasping: < or = to 2 hrs/day   Feet: i.e. Operating Foot Controls:     Driving / Operate Machinery:     Physician Name: Carolina Delgado P.A.-C. Physician Signature: CAROLINA Mathew P.A.-C. e-Signature: Dr. Brannon Strong, Medical Director   Clinic Name / Location: [address] [address] Clinic Phone Number: Dept: [phone]   Appointment Time: 8:45 Am Visit Start Time: 8:36 AM   Check-In Time:  8:34 Am Visit Discharge Time:  9:13am   Original-Treating Physician or Chiropractor    Page 2-Insurer/TPA    Page 3-Employer    Page 4-Employee

## 2019-01-30 ENCOUNTER — PHYSICAL THERAPY (OUTPATIENT)
Dept: PHYSICAL THERAPY | Facility: REHABILITATION | Age: 29
End: 2019-01-30
Attending: ORTHOPAEDIC SURGERY
Payer: COMMERCIAL

## 2019-01-30 DIAGNOSIS — S43.431S GLENOID LABRAL TEAR, RIGHT, SEQUELA: ICD-10-CM

## 2019-01-30 DIAGNOSIS — Z98.890 STATUS POST REPAIR OF GLENOID LABRUM: ICD-10-CM

## 2019-01-30 PROCEDURE — 97140 MANUAL THERAPY 1/> REGIONS: CPT

## 2019-01-30 NOTE — OP THERAPY DAILY TREATMENT
Outpatient Physical Therapy  DAILY TREATMENT     [facility] Physical [address].  [address]  [place] [address]  Phone:  [phone]  Fax:  [phone]    Date: 01/30/2019    Patient: Eddie Daniel  YOB: 1990  MRN: [id number]     Time Calculation  Start time: 1130  Stop time: 1153 Time Calculation (min): 23 minutes     Chief Complaint: Shoulder Injury    Visit #: 11    SUBJECTIVE:  Pt reports he has not been sleeping well.    OBJECTIVE:        Therapeutic Treatments and Modalities:     1. Manual Therapy (CPT 52598), R shoulder and scapula    Therapeutic Treatment and Modalities Summary: PROM R shoulder flexion, scaption,abd, IR/ER.  STM to R LS, UT.  Gentle R GHJ circles, distraction with inf mobs, post-lat mobs, GR II.   Sidelying R scapular mobs (upward rot, abd/add, elev/depression).      Time-based treatments/modalities:  Manual therapy minutes (CPT 33128): 23 minutes       ASSESSMENT:   Response to treatment: Pt more guarded today with R shoulder PROM.  Pt reports shoulder is sore.    PLAN/RECOMMENDATIONS:   Plan for treatment: therapy treatment to continue next visit.  Planned interventions for next visit: continue with current treatment.  Pt is 6 weeks post-op on 1/31.  Progress AROM per post-op instructions/restrictions.

## 2019-02-01 ENCOUNTER — TELEPHONE (OUTPATIENT)
Dept: OCCUPATIONAL MEDICINE | Facility: CLINIC | Age: 29
End: 2019-02-01

## 2019-02-01 ENCOUNTER — PHYSICAL THERAPY (OUTPATIENT)
Dept: PHYSICAL THERAPY | Facility: REHABILITATION | Age: 29
End: 2019-02-01
Attending: ORTHOPAEDIC SURGERY
Payer: COMMERCIAL

## 2019-02-01 DIAGNOSIS — S43.431S GLENOID LABRAL TEAR, RIGHT, SEQUELA: ICD-10-CM

## 2019-02-01 DIAGNOSIS — Z98.890 STATUS POST REPAIR OF GLENOID LABRUM: ICD-10-CM

## 2019-02-01 PROCEDURE — 97140 MANUAL THERAPY 1/> REGIONS: CPT

## 2019-02-01 NOTE — OP THERAPY DAILY TREATMENT
Outpatient Physical Therapy  DAILY TREATMENT     [facility] Physical [address].  [address]  [place] [address]  Phone:  [phone]  Fax:  [phone]    Date: 02/01/2019    Patient: Eddie Daniel  YOB: 1990  MRN: [id number]     Time Calculation  Start time: 0930  Stop time: 1010 Time Calculation (min): 40 minutes     Chief Complaint: Shoulder Injury    Visit #: 12    SUBJECTIVE:  Shoulder feels pretty good.     OBJECTIVE:  Current objective measures: See progress note for PROM and AROM measurements.          Therapeutic Treatments and Modalities:     1. Manual Therapy (CPT 85657), R shoulder and scapula    Therapeutic Treatment and Modalities Summary: PROM R shoulder flexion, scaption,abd, IR/ER.  STM to R LS, UT.  Gentle R GHJ circles, distraction with inf mobs, post-lat mobs, GR II.   Sidelying R scapular mobs (upward rot, abd/add, elev/depression).      Time-based treatments/modalities:  Manual therapy minutes (CPT 94246): 25 minutes       ASSESSMENT:   Response to treatment: Good progress with PROM and joint mobility.    PLAN/RECOMMENDATIONS:   Plan for treatment: therapy treatment to continue next visit.  Planned interventions for next visit: continue with current treatment. Progress AROM as instructed in post-op instructions.  Pt f/u with Dr Isabel scheduled for 2/4.

## 2019-02-01 NOTE — OP THERAPY PROGRESS SUMMARY
Outpatient Physical Therapy  PROGRESS SUMMARY NOTE      [facility] Physical Therapy [address].  [address]  [facility] [address]  Phone:  [phone]  Fax:  [phone]    Date of Visit: 02/01/2019    Patient: Eddie Daniel  YOB: 1990  MRN: [id number]     Referring Provider: Al Isabel M.D.  [address]  [address]  [place] [address]   Referring Diagnosis Anterior dislocation of right humerus, initial encounter [S43.014A]     Visit Diagnoses     ICD-10-CM   1. Status post repair of glenoid labrum Z98.890   2. Glenoid labral tear, right, sequela S43.431S       Rehab Potential: good    Physical Therapy Occurrence Codes    Date of onset of impairment:  11/18/18   Date physical therapy care plan established or reviewed:  12/26/18   Date physical therapy treatment started:  12/26/18            Progress Report Period: 12/26/18- 2/1/19    Progress Summary Details  Current R shoulder PROM: flexion= 120, abd= 70, ER= 21 degrees.  R shoulder AROM measured on 2/1/19 (6 weeks post-op): flexion= 78, abd= 50, ER= 12 degrees.    Referring provider co-signature:  I have reviewed this plan of care and my co-signature certifies the need for services.    Physician Signature: ________________________________ Date: ______________

## 2019-02-04 ENCOUNTER — APPOINTMENT (OUTPATIENT)
Dept: RADIOLOGY | Facility: IMAGING CENTER | Age: 29
End: 2019-02-04
Attending: PREVENTIVE MEDICINE
Payer: COMMERCIAL

## 2019-02-04 ENCOUNTER — OCCUPATIONAL MEDICINE (OUTPATIENT)
Dept: OCCUPATIONAL MEDICINE | Facility: CLINIC | Age: 29
End: 2019-02-04
Payer: COMMERCIAL

## 2019-02-04 VITALS
HEIGHT: 70 IN | TEMPERATURE: 98.7 F | OXYGEN SATURATION: 95 % | HEART RATE: 75 BPM | WEIGHT: 203 LBS | SYSTOLIC BLOOD PRESSURE: 118 MMHG | BODY MASS INDEX: 29.06 KG/M2 | DIASTOLIC BLOOD PRESSURE: 86 MMHG

## 2019-02-04 DIAGNOSIS — S60.221D CONTUSION OF RIGHT HAND, SUBSEQUENT ENCOUNTER: ICD-10-CM

## 2019-02-04 DIAGNOSIS — S80.01XD CONTUSION OF RIGHT KNEE, SUBSEQUENT ENCOUNTER: ICD-10-CM

## 2019-02-04 DIAGNOSIS — S40.011D CONTUSION OF RIGHT SHOULDER, SUBSEQUENT ENCOUNTER: ICD-10-CM

## 2019-02-04 PROCEDURE — 99213 OFFICE O/P EST LOW 20 MIN: CPT | Performed by: PREVENTIVE MEDICINE

## 2019-02-04 PROCEDURE — 73130 X-RAY EXAM OF HAND: CPT | Mod: TC,RT | Performed by: PHYSICIAN ASSISTANT

## 2019-02-04 ASSESSMENT — ENCOUNTER SYMPTOMS
SENSORY CHANGE: 0
TINGLING: 0

## 2019-02-04 NOTE — LETTER
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    Medical Information   Related to Industrial Injury? Yes    Subjective Complaints:  DOI 1/18/2019: 27 yo male presents with right shoulder, wrist and hand pain. Patient was at work when he tripped over a pallet and fell to the ground landing on his right side.  He had previously injured his right shoulder surgery in December.  When he fell his shoulder was in a sling immobilizer.  He reported that he had pain in his right hand and right knee after the fall.  Patient states that overall right shoulder seems to be right about at baseline.  He continues to note right hand and right knee pain mostly on the ulnar aspect of the hand and fifth digit.  He does have a previous fracture to this area.  Pain with forceful gripping and frequent gripping.  Or tingling.  Also notes right knee pain mostly on the lateral side.  Pain with squatting.   Objective Findings: Right hand: Tenderness over the ulnar hand and fifth full digit.  Full range of motion.  Slightly diminished  strength  Right knee: Surgical scars consistent with prior surgery.  Tenderness over the lateral patella and lateral joint line.  Full range of motion.  Normal gait.   Pre-Existing Condition(s):     Assessment:   Condition Same    Status:  Additional Care Required  Permanent Disability:No    Plan:      Diagnostics:      Comments:  XR Right Hand: Negative for acute findings  Continue Ibuprofen and Tylenol as needed  Restricted duty  Follow up 2 weeks    Disability Information   Status: Released to Restricted Duty    From:  2/4/2019  Through: 2/21/2019 Restrictions are: Temporary   Physical Restrictions   Sitting:    Standing:    Stooping:    Bending:      Squatting:    Walking:    Climbing:    Pushing:      Pulling:    Other:    Reaching Above Shoulder (L):   Reaching Above Shoulder (R):       Reaching Below Shoulder (L):    Reaching Below Shoulder (R):      Not to exceed Weight Limits   Carrying(hrs):   Weight Limit(lb):   Lifting(hrs):   Weight  Limit(lb):     Comments: Limited forceful gripping, grasping and pinching of the right hand.  The right hand to less than 10 pounds lifting.  Other restrictions pertaining to the right shoulder per treating orthopedic.    Repetitive Actions   Hands: i.e. Fine Manipulations from Grasping: < or = to 2 hrs/day   Feet: i.e. Operating Foot Controls:     Driving / Operate Machinery:     Physician Name: Arsenio Lebron D.O. Physician Signature: ARSENIO Shearer D.O. e-Signature: Dr. Brannon Strong, Medical Director   Clinic Name / Location: [address],   [address] [place] [address] Clinic Phone Number: Dept: [phone]   Appointment Time: 4:00 Pm Visit Start Time: 3:50 PM   Check-In Time:  3:43 Pm Visit Discharge Time: 4:31PM   Original-Treating Physician or Chiropractor    Page 2-Insurer/TPA    Page 3-Employer    Page 4-Employee

## 2019-02-05 NOTE — PROGRESS NOTES
"Subjective:      Eddie Daniel is a 28 y.o. male who presents with Other (NEW DOi 01/18/19 -R knee/ Right shoulder-better )      DOI 1/18/2019: 27 yo male presents with right shoulder, wrist and hand pain. Patient was at work when he tripped over a pallet and fell to the ground landing on his right side.  He had previously injured his right shoulder surgery in December.  When he fell his shoulder was in a sling immobilizer.  He reported that he had pain in his right hand and right knee after the fall.  Patient states that overall right shoulder seems to be right about at baseline.  He continues to note right hand and right knee pain mostly on the ulnar aspect of the hand and fifth digit.  He does have a previous fracture to this area.  Pain with forceful gripping and frequent gripping.  Or tingling.  Also notes right knee pain mostly on the lateral side.  Pain with squatting.     HPI    Review of Systems   Musculoskeletal: Positive for joint pain.   Neurological: Negative for tingling and sensory change.     SOCHX: Works as a transport  at [employer]  FH: No pertinent family history to this problem.     Objective:     /86   Pulse 75   Temp 37.1 °C (98.7 °F)   Ht 1.778 m (5' 10\")   Wt 92.1 kg (203 lb)   SpO2 95%   BMI 29.13 kg/m²      Physical Exam   Constitutional: He is oriented to person, place, and time. He appears well-developed and well-nourished.   Cardiovascular: Normal rate.    Pulmonary/Chest: Effort normal.   Neurological: He is alert and oriented to person, place, and time.   Skin: Skin is warm and dry.   Psychiatric: He has a normal mood and affect. Judgment normal.       Right hand: Tenderness over the ulnar hand and fifth full digit.  Full range of motion.  Slightly diminished  strength  Right knee: Surgical scars consistent with prior surgery.  Tenderness over the lateral patella and lateral joint line.  Full range of motion.  Normal gait.     "   Assessment/Plan:     1. Contusion of right knee, subsequent encounter  2. Contusion of right hand, subsequent encounter  - DX-HAND 3+ RIGHT; Future  3. Contusion of right shoulder, subsequent encounter      XR Right Hand: Negative for acute findings   Continue Ibuprofen and Tylenol as needed   Restricted duty   Follow up 2 weeks

## 2019-02-06 ENCOUNTER — PHYSICAL THERAPY (OUTPATIENT)
Dept: PHYSICAL THERAPY | Facility: REHABILITATION | Age: 29
End: 2019-02-06
Attending: ORTHOPAEDIC SURGERY
Payer: COMMERCIAL

## 2019-02-06 DIAGNOSIS — S43.431S GLENOID LABRAL TEAR, RIGHT, SEQUELA: ICD-10-CM

## 2019-02-06 DIAGNOSIS — Z98.890 STATUS POST REPAIR OF GLENOID LABRUM: ICD-10-CM

## 2019-02-06 PROCEDURE — 97140 MANUAL THERAPY 1/> REGIONS: CPT

## 2019-02-06 PROCEDURE — 97110 THERAPEUTIC EXERCISES: CPT

## 2019-02-06 NOTE — OP THERAPY DAILY TREATMENT
Outpatient Physical Therapy  DAILY TREATMENT     [facility] Physical [address].  [address]  [place] [address]  Phone:  [phone]  Fax:  [phone]    Date: 02/06/2019    Patient: Eddie Daniel  YOB: 1990  MRN: [id number]     Time Calculation  Start time: 0900  Stop time: 0925 Time Calculation (min): 25 minutes     Chief Complaint: Shoulder Injury    Visit #: 13    SUBJECTIVE:  Follow up with Dr Isabel on 2/4 went well.  DC sling, initiate AROM.  Pt states it feels sore. He accidentally reached his R hand to his back and his shoulder really hurt.  Still difficult sleeping due to R shoulder pain.    OBJECTIVE:        Therapeutic Exercises (CPT 82569):     1. Supine OH flexion AAROM with dowel, x5    2. Supine ER AAROM with dowel, x5    3. Supine chest flye, x10    4. Supine serratus punch, x10    5. Rows with pink tband, 2x10    6. B scaption AROM, x10    7. B sh extension AROM, x10    8. OH pulley, x2 min      Therapeutic Exercise Summary: Pt given these exercises (without pulley) for HEP.    Therapeutic Treatments and Modalities:     1. Manual Therapy (CPT 36831), R shoulder    Therapeutic Treatment and Modalities Summary: PROM flexion and ER.  Posterior GHJ mobs GrII, inferior GHJ mobs, Gr II.  STM deltoid and distal pec.    Time-based treatments/modalities:      ASSESSMENT:   Response to treatment: Pt demo fair nguyễn of AAROM and AROM R shoulder.    PLAN/RECOMMENDATIONS:   Plan for treatment: therapy treatment to continue next visit.  Planned interventions for next visit: continue with current treatment.

## 2019-02-08 ENCOUNTER — PHYSICAL THERAPY (OUTPATIENT)
Dept: PHYSICAL THERAPY | Facility: REHABILITATION | Age: 29
End: 2019-02-08
Attending: ORTHOPAEDIC SURGERY
Payer: COMMERCIAL

## 2019-02-08 DIAGNOSIS — Z98.890 STATUS POST REPAIR OF GLENOID LABRUM: ICD-10-CM

## 2019-02-08 DIAGNOSIS — S43.431S GLENOID LABRAL TEAR, RIGHT, SEQUELA: ICD-10-CM

## 2019-02-08 PROCEDURE — 97110 THERAPEUTIC EXERCISES: CPT

## 2019-02-08 PROCEDURE — 97140 MANUAL THERAPY 1/> REGIONS: CPT

## 2019-02-08 NOTE — OP THERAPY DAILY TREATMENT
Outpatient Physical Therapy  DAILY TREATMENT     [facility] Physical [address].  [address]  [place] [address]  Phone:  [phone]  Fax:  [phone]    Date: 02/08/2019    Patient: Eddie Daniel  YOB: 1990  MRN: [id number]     Time Calculation  Start time: 1200  Stop time: 1227 Time Calculation (min): 27 minutes     Chief Complaint: Shoulder Injury    Visit #: 14    SUBJECTIVE:  Pt reports R shoulder lateral and anterior pain and soreness.  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    OBJECTIVE:  Seated R shoulder AROM: flexion= 95, ext= 28, ER= 25 degrees.        Therapeutic Exercises (CPT 06564):     1. Supine OH flexion AAROM with dowel, x10    2. B shoulder ER at wall, x10    3. AAROM flexion at table, x10 R    6. B scaption AROM, x10    7. B sh extension AROM, x10    8. OH pulley, x3 min    9. Scap retraction at wall, 2x10    10. R shoulder ER AAROM with dowel, x10    Therapeutic Treatments and Modalities:     1. Manual Therapy (CPT 66899), R shoulder    Therapeutic Treatment and Modalities Summary: PROM flexion and ER.  Posterior GHJ mobs GrII, inferior GHJ mobs, Gr II.  STM deltoid and RC tendon.    Time-based treatments/modalities:  Manual therapy minutes (CPT 01393): 10 minutes  Therapeutic exercise minutes (CPT 81766): 17 minutes         ASSESSMENT:   Response to treatment: Progressing as expected.    PLAN/RECOMMENDATIONS:   Plan for treatment: therapy treatment to continue next visit.  Planned interventions for next visit: continue with current treatment.

## 2019-02-13 ENCOUNTER — PHYSICAL THERAPY (OUTPATIENT)
Dept: PHYSICAL THERAPY | Facility: REHABILITATION | Age: 29
End: 2019-02-13
Attending: ORTHOPAEDIC SURGERY
Payer: COMMERCIAL

## 2019-02-13 DIAGNOSIS — Z98.890 STATUS POST REPAIR OF GLENOID LABRUM: ICD-10-CM

## 2019-02-13 DIAGNOSIS — S43.431S GLENOID LABRAL TEAR, RIGHT, SEQUELA: ICD-10-CM

## 2019-02-13 PROCEDURE — 97110 THERAPEUTIC EXERCISES: CPT

## 2019-02-13 PROCEDURE — 97140 MANUAL THERAPY 1/> REGIONS: CPT

## 2019-02-13 NOTE — OP THERAPY DAILY TREATMENT
Outpatient Physical Therapy  DAILY TREATMENT     [facility] Physical [address].  [address]  [place] [address]  Phone:  [phone]  Fax:  [phone]    Date: 02/13/2019    Patient: Eddie Daniel  YOB: 1990  MRN: [id number]     Time Calculation  Start time: 1501  Stop time: 1524 Time Calculation (min): 23 minutes     Chief Complaint: Shoulder Injury    Visit #: 15    SUBJECTIVE:  Shoulder is sore from suddenly moving earlier today.  Hasn't been doing much HEP, too much going on at home and with family issues.    OBJECTIVE:        Therapeutic Exercises (CPT 30043):     1. Supine OH flexion AAROM with dowel, x10    2. B shoulder ER, x15    3. AAROM flexion at table, x10 R    4. Supine serratus punch, x10    5. Supine chest press with dowel, x10    6. B scaption AROM, x15    7. B sh extension AROM, x15    8. OH pulley, x3 min    9. Scap retraction at wall, 2x10    10. R shoulder ER AAROM with dowel, x10    Therapeutic Treatments and Modalities:     1. Manual Therapy (CPT 06638), R shoulder    Therapeutic Treatment and Modalities Summary: PROM flexion and ER.   inferior GHJ mobs, Gr II, GHJ distraction GrII.  STM deltoid and RC tendon.    Time-based treatments/modalities:  Manual therapy minutes (CPT 10878): 8 minutes  Therapeutic exercise minutes (CPT 57882): 15 minutes       ASSESSMENT:   Response to treatment: Limited by pain.    PLAN/RECOMMENDATIONS:   Plan for treatment: therapy treatment to continue next visit.  Planned interventions for next visit: continue with current treatment.

## 2019-02-14 NOTE — OP THERAPY PROGRESS SUMMARY
Outpatient Physical Therapy  PROGRESS SUMMARY NOTE      [facility] Physical Therapy [address].  [address]  [facility] [address]  Phone:  [phone]  Fax:  [phone]    Date of Visit: 02/13/2019    Patient: Eddie Daniel  YOB: 1990  MRN: [id number]     Referring Provider: Al Isabel M.D.  [address]  [address]  [place] [address]   Referring Diagnosis Anterior dislocation of right humerus, initial encounter [S43.014A]     Visit Diagnoses     ICD-10-CM   1. Status post repair of glenoid labrum Z98.890   2. Glenoid labral tear, right, sequela S43.431S       Rehab Potential: excellent    Physical Therapy Occurrence Codes    Date of onset of impairment:  11/18/18   Date physical therapy care plan established or reviewed:  12/26/18   Date physical therapy treatment started:  12/26/18          Cert Period: 2/19 to 4/15  Progress Report Period: 12/21/18 to 2/13/19                      Objective Findings and Assessment:   Patient progression towards goals: Good progress in R shoulder AROM in all directions post-op.    Objective findings and assessment details: R shoulder AROM: flexion= 95, ext= 28, ER= 25 degrees.    Goals:   Short Term Goals:   1. R shoulder flexion PROM to 120 degrees in supine.- MET  2. R shoulder ER PROM to 30 degrees in supine. - MET  3. R wrist, elbow, and scapular AROM WNL.- MET    New Short Term Goals:  1. R shoulder AROM flexion= 120 deg.  2. R shoulder AROM ER= 45 deg.  3. Independent with daily HEP.     Short term goal time span:  2-4 weeks      Long Term Goals:    1. R shoulder flexion AROM to 60 degrees.- MET  2. R shoulder ER AROM to 30 degrees.- NOT MET  3. Improved function via Quick Dash score.- NOT TESTED    New Long Term Goals:  1. Scaption to 90deg with 5# handweight without increased pain.  2. Isometric shoulder strengthening without increased pain.  3. Sleeping through night without waking due to shoulder pain.  Long term goal  time span:  6-8 weeks    Plan:   Planned therapy interventions:  E Stim Unattended (CPT 84975), Manual Therapy (CPT 10370) and Therapeutic Exercise (CPT 75231)  Frequency:  2x week  Duration in weeks:  8  Plan details:  Progress AROM and strengthening per post-op restrictions/ protocol.        Referring provider co-signature:  I have reviewed this plan of care and my co-signature certifies the need for services.    Physician Signature: ________________________________ Date: ______________

## 2019-02-15 ENCOUNTER — PHYSICAL THERAPY (OUTPATIENT)
Dept: PHYSICAL THERAPY | Facility: REHABILITATION | Age: 29
End: 2019-02-15
Attending: ORTHOPAEDIC SURGERY
Payer: COMMERCIAL

## 2019-02-15 DIAGNOSIS — S43.431S GLENOID LABRAL TEAR, RIGHT, SEQUELA: ICD-10-CM

## 2019-02-15 DIAGNOSIS — Z98.890 STATUS POST REPAIR OF GLENOID LABRUM: ICD-10-CM

## 2019-02-15 PROCEDURE — 97140 MANUAL THERAPY 1/> REGIONS: CPT

## 2019-02-15 PROCEDURE — 97110 THERAPEUTIC EXERCISES: CPT

## 2019-02-15 NOTE — OP THERAPY DAILY TREATMENT
Outpatient Physical Therapy  DAILY TREATMENT     [facility] Physical [address].  [address]  [place] [address]  Phone:  [phone]  Fax:  [phone]    Date: 02/15/2019    Patient: Eddie Daniel  YOB: 1990  MRN: [id number]     Time Calculation  Start time: 1550  Stop time: 1625 Time Calculation (min): 35 minutes     Chief Complaint: Shoulder Injury    Visit #: 16    SUBJECTIVE:  Shoulder feels ok.  A bit sore in bicep.  States he tries not to lift with RUE, but sometimes uses R hand to steady items he is carrying in LUE.    OBJECTIVE:        Therapeutic Exercises (CPT 17548):     1. Shoulder flexion against 1/2 FR at wall, x10R    2. B shoulder ER, x15    3. AAROM flexion at table, x10 R    4. Supine serratus punch, x10    5. Ws against 1/2 FR at wall, x10    6. B scaption AROM, x15    7. B sh extension AROM, x15    8. UBE, level 1, 2 min forward, 2 min reverse    9. Scap retraction at wall with 1/2 FR, 2x10    10. B tricep press, 10#, 2x10    11. Rows with pink tband, 2x 10    Therapeutic Treatments and Modalities:     1. Manual Therapy (CPT 51248), R shoulder    Therapeutic Treatment and Modalities Summary: PROM flexion and ER.   inferior GHJ mobs, Gr II, GHJ distraction GrII.  STM bicep and RC tendon    Time-based treatments/modalities:  Manual therapy minutes (CPT 06929): 10 minutes  Therapeutic exercise minutes (CPT 46179): 20 minutes       ASSESSMENT:   Response to treatment: Progressing as expected post-op.    PLAN/RECOMMENDATIONS:   Plan for treatment: therapy treatment to continue next visit.  Introduce prone shoulder AROM exercises.  Planned interventions for next visit: continue with current treatment.

## 2019-02-19 ENCOUNTER — PHYSICAL THERAPY (OUTPATIENT)
Dept: PHYSICAL THERAPY | Facility: REHABILITATION | Age: 29
End: 2019-02-19
Attending: ORTHOPAEDIC SURGERY
Payer: COMMERCIAL

## 2019-02-19 DIAGNOSIS — S43.431S GLENOID LABRAL TEAR, RIGHT, SEQUELA: ICD-10-CM

## 2019-02-19 DIAGNOSIS — Z98.890 STATUS POST REPAIR OF GLENOID LABRUM: ICD-10-CM

## 2019-02-19 PROCEDURE — 97110 THERAPEUTIC EXERCISES: CPT

## 2019-02-19 PROCEDURE — 97140 MANUAL THERAPY 1/> REGIONS: CPT

## 2019-02-19 NOTE — OP THERAPY DAILY TREATMENT
Outpatient Physical Therapy  DAILY TREATMENT     [facility] Physical [address].  [address]  [place] [address]  Phone:  [phone]  Fax:  [phone]    Date: 02/19/2019    Patient: Eddie Daniel  YOB: 1990  MRN: [id number]     Time Calculation  Start time: 1102  Stop time: 1130 Time Calculation (min): 28 minutes     Chief Complaint: Shoulder Injury    Visit #: 17    SUBJECTIVE:  Shoulder feels pretty good today, haven't done any lifting or reaching at work today, just paperwork.    OBJECTIVE:  Quickdash General Total Score: 36.36     Therapeutic Exercises (CPT 47329):     1. Snow angels, x10, R UE to almost 90 deg abd    2. B shoulder ER against 1/2 FR at wall, 2x10    3. AAROM flexion at table, x10 R    4. Serratus hug, x10    5. Ws against 1/2 FR at wall, x10    6. B scaption AROM, x15    7. Prone shoulder ext, 2x10 R    8. UBE, level 1, 2 min forward, 2 min reverse    9. Prone scap retraction, 2x 10 R    10. B tricep press, 15# pulley, 2x10    11. Prone rows, 2x10 R    12. Supine ER AAROM with dowel, x10 R    Therapeutic Treatments and Modalities:     1. Manual Therapy (CPT 33078), R shoulder    Therapeutic Treatment and Modalities Summary: PROM flexion and ER.  GHJ distraction, GrII, Posterior GHJ mobs, GrII.  STM prox bicep and RC tendon, distal pecs.    Time-based treatments/modalities:  Manual therapy minutes (CPT 36822): 8 minutes  Therapeutic exercise minutes (CPT 61108): 20 minutes         ASSESSMENT:   Response to treatment: Continued improvement in function and ROM.  Sydney new therex today without increased pain.    PLAN/RECOMMENDATIONS:   Plan for treatment: therapy treatment to continue next visit.  Planned interventions for next visit: continue with current treatment.

## 2019-02-21 ENCOUNTER — PHYSICAL THERAPY (OUTPATIENT)
Dept: PHYSICAL THERAPY | Facility: REHABILITATION | Age: 29
End: 2019-02-21
Attending: ORTHOPAEDIC SURGERY
Payer: COMMERCIAL

## 2019-02-21 ENCOUNTER — OCCUPATIONAL MEDICINE (OUTPATIENT)
Dept: OCCUPATIONAL MEDICINE | Facility: CLINIC | Age: 29
End: 2019-02-21
Payer: COMMERCIAL

## 2019-02-21 VITALS
BODY MASS INDEX: 28.14 KG/M2 | TEMPERATURE: 99.8 F | SYSTOLIC BLOOD PRESSURE: 124 MMHG | OXYGEN SATURATION: 99 % | HEART RATE: 78 BPM | WEIGHT: 190 LBS | HEIGHT: 69 IN | DIASTOLIC BLOOD PRESSURE: 72 MMHG

## 2019-02-21 DIAGNOSIS — S80.01XD CONTUSION OF RIGHT KNEE, SUBSEQUENT ENCOUNTER: ICD-10-CM

## 2019-02-21 DIAGNOSIS — Z98.890 STATUS POST REPAIR OF GLENOID LABRUM: ICD-10-CM

## 2019-02-21 DIAGNOSIS — S40.011D CONTUSION OF RIGHT SHOULDER, SUBSEQUENT ENCOUNTER: ICD-10-CM

## 2019-02-21 DIAGNOSIS — S60.221D CONTUSION OF RIGHT HAND, SUBSEQUENT ENCOUNTER: ICD-10-CM

## 2019-02-21 DIAGNOSIS — S43.431S GLENOID LABRAL TEAR, RIGHT, SEQUELA: ICD-10-CM

## 2019-02-21 PROCEDURE — 99212 OFFICE O/P EST SF 10 MIN: CPT | Performed by: PREVENTIVE MEDICINE

## 2019-02-21 PROCEDURE — 97140 MANUAL THERAPY 1/> REGIONS: CPT

## 2019-02-21 PROCEDURE — 97110 THERAPEUTIC EXERCISES: CPT

## 2019-02-21 NOTE — LETTER
[address],   [address] [place] [address]  Phone:  [phone] - Fax:  [phone]   [facility] Progress Report and Disability Certification  Date of Service: 2/21/2019   No Show:  No  Date / Time of Next Visit:  Release from care   Claim Information   Patient Name: Eddie Daniel  Claim Number:     Employer: [employer]  Date of Injury: 1/18/2019     Insurer / TPA: Workers [employer]  ID / SSN:     Occupation: Transport Distribuition Specialist   Diagnosis: Diagnoses of Contusion of right knee, subsequent encounter, Contusion of right hand, subsequent encounter, and Contusion of right shoulder, subsequent encounter were pertinent to this visit.    Medical Information   Related to Industrial Injury? Yes    Subjective Complaints:  DOI 1/18/2019: 29 yo male presents with right shoulder, wrist and hand pain. Patient was at work when he tripped over a pallet and fell to the ground landing on his right side.  He had previously injured his right shoulder surgery in December.  When he fell his shoulder was in a sling immobilizer.  He reported that he had pain in his right hand and right knee after the fall.  Patient states that overall right shoulder seems to be right about at baseline.  Patient notes overall good improvement in right hand and right injuries.  He does continue have some right hand pain mostly at the base of the fifth finger.  This pain is worse with movements but has lessened somewhat.  Feels ready for release at this time.   Objective Findings: Right hand: Tenderness over the base of the fifth digit.  Full range of motion.  Strength intact.  Right knee: Surgical scars consistent with prior surgery.  No tenderness.  Full range of motion   Pre-Existing Condition(s):     Assessment:   Condition Improved    Status: Discharged /  MMI  Permanent Disability:No    Plan:      Diagnostics:      Comments:  Released from care  Full duty  Expect  resolution of symptoms in the next few weeks.  If symptoms do not resolve or if symptoms worsen return to clinic.    Disability Information   Status: Released to Full Duty    From:  2/21/2019  Through:   Restrictions are:     Physical Restrictions   Sitting:    Standing:    Stooping:    Bending:      Squatting:    Walking:    Climbing:    Pushing:      Pulling:    Other:    Reaching Above Shoulder (L):   Reaching Above Shoulder (R):       Reaching Below Shoulder (L):    Reaching Below Shoulder (R):      Not to exceed Weight Limits   Carrying(hrs):   Weight Limit(lb):   Lifting(hrs):   Weight  Limit(lb):     Comments:      Repetitive Actions   Hands: i.e. Fine Manipulations from Grasping:     Feet: i.e. Operating Foot Controls:     Driving / Operate Machinery:     Physician Name: Arsenio Lebron D.O. Physician Signature: ARSENIO Shearer D.O. e-Signature: Dr. Brannon Strong, Medical Director   Clinic Name / Location: [address],   [address] [place] [address] Clinic Phone Number: Dept: [phone]   Appointment Time: 2:15 Pm Visit Start Time: 1:52 PM   Check-In Time:  1:43 Pm Visit Discharge Time:  2:04 PM   Original-Treating Physician or Chiropractor    Page 2-Insurer/TPA    Page 3-Employer    Page 4-Employee

## 2019-02-21 NOTE — OP THERAPY DAILY TREATMENT
Outpatient Physical Therapy  DAILY TREATMENT     [facility] Physical [address].  [address]  [place] [address]  Phone:  [phone]  Fax:  [phone]    Date: 02/21/2019    Patient: Eddie Daniel  YOB: 1990  MRN: [id number]     Time Calculation  Start time: 1100  Stop time: 1124 Time Calculation (min): 24 minutes     Chief Complaint: Post-Op Pain    Visit #: 18    SUBJECTIVE:  Pt reports shoulder feels pretty good.    OBJECTIVE:  Current objective measures: Seated R shoulder AROM: flexion= 110, ext= 48, abd= 90, ER= 50, HBH to R occiput, HBB to R L5..  Patient at 9 weeks post-op today.        Therapeutic Exercises (CPT 83152):     1. Snow angels, x10, R UE to almost 90 deg abd    2. B shoulder ER in supine, x15    3. Scap retraction against 1/2 FR at wall, x15    4. Serratus hug, x15    5. Ws against 1/2 FR at wall, x15    6. B scaption AROM, x15    7. Prone shoulder ext, 2x10 R    8. UBE, level 1, 2 min forward, 2 min reverse    9. Prone scap retraction, 2x 10 R    10. B tricep press, 15# pulley, x15    11. Prone rows, 2x10 R    12. Rows with 15# pulley, x15    Therapeutic Treatments and Modalities:     1. Manual Therapy (CPT 78160), R shoulder    Therapeutic Treatment and Modalities Summary: PROM flexion and ER.   Posterior GHJ mobs, GrII.  GHJ circles/oscillations, Gr II.  STM prox bicep and RC tendon, distal pecs.    Time-based treatments/modalities:  Manual therapy minutes (CPT 14692): 6 minutes  Therapeutic exercise minutes (CPT 12699): 18 minutes     ASSESSMENT:   Response to treatment: Much improved AROM over past two weeks.    PLAN/RECOMMENDATIONS:   Plan for treatment: therapy treatment to continue next visit.  Planned interventions for next visit: continue with current treatment.

## 2019-02-21 NOTE — PROGRESS NOTES
"Subjective:      Eddie Daniel is a 28 y.o. male who presents with Follow-Up (01/18/19-  better )      DOI 1/18/2019: 29 yo male presents with right shoulder, wrist and hand pain. Patient was at work when he tripped over a pallet and fell to the ground landing on his right side.  He had previously injured his right shoulder surgery in December.  When he fell his shoulder was in a sling immobilizer.  He reported that he had pain in his right hand and right knee after the fall.  Patient states that overall right shoulder seems to be right about at baseline.  Patient notes overall good improvement in right hand and right injuries.  He does continue have some right hand pain mostly at the base of the fifth finger.  This pain is worse with movements but has lessened somewhat.  Feels ready for release at this time.     HPI    ROS       Objective:     /72   Pulse 78   Temp 37.7 °C (99.8 °F)   Ht 1.753 m (5' 9\")   Wt 86.2 kg (190 lb)   SpO2 99%   BMI 28.06 kg/m²      Physical Exam    Right hand: Tenderness over the base of the fifth digit.  Full range of motion.  Strength intact.  Right knee: Surgical scars consistent with prior surgery.  No tenderness.  Full range of motion       Assessment/Plan:     1. Contusion of right knee, subsequent encounter  2. Contusion of right hand, subsequent encounter  3. Contusion of right shoulder, subsequent encounter    Released from care  Full duty  Expect resolution of symptoms in the next few weeks.  If symptoms do not resolve or if symptoms worsen return to clinic.      "

## 2019-02-26 ENCOUNTER — PHYSICAL THERAPY (OUTPATIENT)
Dept: PHYSICAL THERAPY | Facility: REHABILITATION | Age: 29
End: 2019-02-26
Attending: ORTHOPAEDIC SURGERY
Payer: COMMERCIAL

## 2019-02-26 DIAGNOSIS — S43.431S GLENOID LABRAL TEAR, RIGHT, SEQUELA: ICD-10-CM

## 2019-02-26 DIAGNOSIS — Z98.890 STATUS POST REPAIR OF GLENOID LABRUM: ICD-10-CM

## 2019-02-26 PROCEDURE — 97140 MANUAL THERAPY 1/> REGIONS: CPT

## 2019-02-26 PROCEDURE — 97110 THERAPEUTIC EXERCISES: CPT

## 2019-02-26 NOTE — OP THERAPY DAILY TREATMENT
Outpatient Physical Therapy  DAILY TREATMENT     [facility] Physical [address].  [address]  [place] [address]  Phone:  [phone]  Fax:  [phone]    Date: 02/26/2019    Patient: Eddie Daniel  YOB: 1990  MRN: [id number]     Time Calculation  Start time: 0859  Stop time: 0924 Time Calculation (min): 25 minutes     Chief Complaint: Post-Op Pain    Visit #: 19    SUBJECTIVE:  Shoulder is doing ok.    OBJECTIVE:        Therapeutic Exercises (CPT 84505):     1. Snow angels, x10    2. B shoulder ER in supine, x10    3. Scap retraction against 1/2 FR at wall, x15    4. Serratus hug, x15    5. Ws at wall, x15    6. Prone sh horiz abd, 2x 10 R    7. Prone shoulder ext, 2x10 R    8. UBE, level 1, 2 min forward, 2 min reverse    9. Prone scap retraction, 2x 10 R    10. B tricep press, 25# pulley, x15    12. Rows with 25# pulley, x15    13. Wall push ups, x15    14. Wall serratus punch, x15    Therapeutic Treatments and Modalities:     1. Manual Therapy (CPT 62459), R shoulder    Therapeutic Treatment and Modalities Summary: PROM flexion and ER.   Posterior GHJ mobs, GrII.  GHJ circles/oscillations, Gr II.  STM prox bicep and RC tendon, distal RC and lats.    Time-based treatments/modalities:  Manual therapy minutes (CPT 02493): 8 minutes  Therapeutic exercise minutes (CPT 19493): 17 minutes       ASSESSMENT:   Response to treatment: Good progression of AROM post-op.    PLAN/RECOMMENDATIONS:   Plan for treatment: therapy treatment to continue next visit.  Continue 1x/wk for 3 weeks prior to strengthening at 12 weeks post-op (3/14/19).  Planned interventions for next visit: continue with current treatment.

## 2019-02-28 ENCOUNTER — APPOINTMENT (OUTPATIENT)
Dept: PHYSICAL THERAPY | Facility: REHABILITATION | Age: 29
End: 2019-02-28
Attending: ORTHOPAEDIC SURGERY
Payer: COMMERCIAL

## 2019-03-06 ENCOUNTER — PHYSICAL THERAPY (OUTPATIENT)
Dept: PHYSICAL THERAPY | Facility: REHABILITATION | Age: 29
End: 2019-03-06
Attending: ORTHOPAEDIC SURGERY
Payer: COMMERCIAL

## 2019-03-06 DIAGNOSIS — Z98.890 STATUS POST REPAIR OF GLENOID LABRUM: ICD-10-CM

## 2019-03-06 PROCEDURE — 97140 MANUAL THERAPY 1/> REGIONS: CPT

## 2019-03-06 PROCEDURE — 97110 THERAPEUTIC EXERCISES: CPT

## 2019-03-06 PROCEDURE — 97014 ELECTRIC STIMULATION THERAPY: CPT

## 2019-03-19 ENCOUNTER — APPOINTMENT (OUTPATIENT)
Dept: PHYSICAL THERAPY | Facility: REHABILITATION | Age: 29
End: 2019-03-19
Attending: ORTHOPAEDIC SURGERY
Payer: COMMERCIAL

## 2019-03-27 ENCOUNTER — PHYSICAL THERAPY (OUTPATIENT)
Dept: PHYSICAL THERAPY | Facility: REHABILITATION | Age: 29
End: 2019-03-27
Attending: ORTHOPAEDIC SURGERY
Payer: COMMERCIAL

## 2019-03-27 DIAGNOSIS — S43.014A: ICD-10-CM

## 2019-03-27 DIAGNOSIS — S43.431S GLENOID LABRAL TEAR, RIGHT, SEQUELA: ICD-10-CM

## 2019-03-27 DIAGNOSIS — Z98.890 STATUS POST REPAIR OF GLENOID LABRUM: ICD-10-CM

## 2019-03-27 PROCEDURE — 97110 THERAPEUTIC EXERCISES: CPT

## 2019-03-27 NOTE — OP THERAPY DAILY TREATMENT
Outpatient Physical Therapy  DAILY TREATMENT     [facility] Physical [address]  [address]  [place] [address]  Phone:  [phone]  Fax:  [phone]    Date: 03/27/2019    Patient: Eddie Daniel  YOB: 1990  MRN: [id number]     Time Calculation  Start time: 1500  Stop time: 1530 Time Calculation (min): 30 minutes     Chief Complaint: Post-Op Pain    Visit #: 21    SUBJECTIVE:  R shoulder is feeling good.  Did some running, but then surgeon told me not to run.  Doing some HEP and normal daily activities including lifting.      OBJECTIVE:  Current objective measures: R shoulder AROM: flexion= 130, abd= 130, ext= WNL, ER= 58 deg.  Impaired scapular mobility above 90 deg shoulder flexion.        Therapeutic Exercises (CPT 90316):     1. UBE, level 3, x5 min, alt forward/reverse 1 min each    2. B shoulder ER with green tband, x15    3. Shoulder ER with pink band, x15    4. SHoulder IR with pink band, x15    5. SHoulder ext with green band, x15    6. Table push ups, x15    7. Wall slides with low trap lift off, x15    8. Scaption to 90 deg with 3#, x15    9. Ws at wall, x15    10. Bicep curls with 5#, x15, with ecc focus    11. Rows with blue band, x15      Therapeutic Exercise Summary: Added strengthening exercises to HEP.  See scanned media.      Time-based treatments/modalities:  Therapeutic exercise minutes (CPT 91824): 25 minutes         ASSESSMENT:   Response to treatment: Good nguyễn for new strengthening exercises.    PLAN/RECOMMENDATIONS:   Plan for treatment: therapy treatment to continue next visit.  Planned interventions for next visit: continue with current treatment.

## 2019-03-28 NOTE — OP THERAPY PROGRESS SUMMARY
Outpatient Physical Therapy  PROGRESS SUMMARY NOTE      [facility] Physical Therapy [address]  [address]  [facility] [address]  Phone:  [phone]  Fax:  [phone]    Date of Visit: 03/27/2019    Patient: Eddie Daniel  YOB: 1990  MRN: [id number]     Referring Provider: Al Isabel M.D.  [address]  [address]  [place] [address]   Referring Diagnosis Anterior dislocation of right humerus, initial encounter [S43.014A]     Visit Diagnoses     ICD-10-CM   1. Status post repair of glenoid labrum Z98.890   2. Glenoid labral tear, right, sequela S43.431S   3. Anterior dislocation of humerus, closed, right, initial encounter S43.014A       Rehab Potential: excellent    Physical Therapy Occurrence Codes    Date of onset of impairment:  11/18/18   Date physical therapy care plan established or reviewed:  12/26/18   Date physical therapy treatment started:  12/26/18          Cert Period: 3/28/19 to 5/22/19  Progress Report Period: 2/2/19 to 3/27/19        Objective Findings and Assessment:   Patient progression towards goals: Pt demo fair R shoulder AROM, returning to normal daily activities.      Objective findings and assessment details: R shoulder AROM: flexion= 130, abd= 130, ext= WNL, ER= 58 deg.  Impaired scapular mobility above 90 deg shoulder flexion.        Goals:   Short Term Goals:   1. R shoulder flexion PROM to 120 degrees in supine.- MET  2. R shoulder ER PROM to 30 degrees in supine.- MET  3. R wrist, elbow, and scapular AROM WNL.- PARTIALLY MET, CONTINUE FOR SCAPULAR MOBILITY  NEW SHORT TERM GOALS:  4. R shoulder MMTs= 4+/5 or greater in all directions.  5. Normal scapulohumeral rhythm with GHJ flexion overhead.         Short term goal time span:  2-4 weeks      Long Term Goals:    1. R shoulder flexion AROM to 60 degrees.- MET  2. R shoulder ER AROM to 30 degrees.- MET  3. Improved function via Quick Dash score.- CONTINUE  NEW LONG TERM GOALS:  4. R  shoulder flexion AROM to 150 deg without compensations.  5. Pt able to lift 25# from floor to shoulder height with proper form and without pain.  6. Pt return to motorcycle riding.  Long term goal time span:  1-2 months    Plan:   Planned therapy interventions:  Neuromuscular Re-education (CPT 16192) and Therapeutic Exercise (CPT 14525)  Frequency:  2x week  Duration in weeks:  8        Referring provider co-signature:  I have reviewed this plan of care and my co-signature certifies the need for services.    Physician Signature: ________________________________ Date: ______________

## 2019-03-29 ENCOUNTER — PHYSICAL THERAPY (OUTPATIENT)
Dept: PHYSICAL THERAPY | Facility: REHABILITATION | Age: 29
End: 2019-03-29
Attending: ORTHOPAEDIC SURGERY
Payer: COMMERCIAL

## 2019-03-29 DIAGNOSIS — S43.431S GLENOID LABRAL TEAR, RIGHT, SEQUELA: ICD-10-CM

## 2019-03-29 DIAGNOSIS — S43.014A: ICD-10-CM

## 2019-03-29 DIAGNOSIS — Z98.890 STATUS POST REPAIR OF GLENOID LABRUM: ICD-10-CM

## 2019-03-29 PROCEDURE — 97110 THERAPEUTIC EXERCISES: CPT

## 2019-03-29 NOTE — OP THERAPY DAILY TREATMENT
"  Outpatient Physical Therapy  DAILY TREATMENT     [facility] Physical [address].  [address]  [place] [address]  Phone:  [phone]  Fax:  [phone]    Date: 03/29/2019    Patient: Eddie Daniel  YOB: 1990  MRN: [id number]     Time Calculation  Start time: 1330  Stop time: 1355 Time Calculation (min): 25 minutes     Chief Complaint: Post-Op Pain    Visit #: 22    SUBJECTIVE:  R shoulder was sore on Thursday due to doing more picking at work than he has been doing (10 page instead of 7 page list).  Felt \"burning\".  Reports no pain/soreness from therex last visit.    OBJECTIVE:        Therapeutic Exercises (CPT 47368):     1. Standing UBE, level 2, x4 min , 1min alt forward/reverse    2. Prone over ball B sh ext, x15    3. Prone over ball B sh horiz abd, x15    4. Sh IR isometric hold with pink tband and step away, 2x10    5. Sh ER isometric hold with pink tband and step away, 2x10    6. Rows with 25# pulley, x20    7. B biceps with 8# weights, x20- ecc focus    8. B scaption with 3# weight, x10, cues for R scapular rhythm    9. Supine chest press with 5# handweights, x15    10. Supine chest flye with 3# handweights, x15    11. Supine serratus punch with 3# handweights, x15    12. Table push ups, x15    13. 5# pulley  R low row, x15    14. 5# pulley R high row, x15      Time-based treatments/modalities:  Therapeutic exercise minutes (CPT 55281): 25 minutes         ASSESSMENT:   Response to treatment: Fair tolerance for strengthening exercise.  Cueing required for scapulohumeral rhythm above 90 deg FF.    PLAN/RECOMMENDATIONS:   Plan for treatment: therapy treatment to continue next visit.  Planned interventions for next visit: continue with current treatment.      "

## 2019-04-02 ENCOUNTER — OFFICE VISIT (OUTPATIENT)
Dept: URGENT CARE | Facility: PHYSICIAN GROUP | Age: 29
End: 2019-04-02
Payer: COMMERCIAL

## 2019-04-02 ENCOUNTER — HOSPITAL ENCOUNTER (EMERGENCY)
Facility: MEDICAL CENTER | Age: 29
End: 2019-04-03
Attending: EMERGENCY MEDICINE
Payer: COMMERCIAL

## 2019-04-02 ENCOUNTER — APPOINTMENT (OUTPATIENT)
Dept: RADIOLOGY | Facility: MEDICAL CENTER | Age: 29
End: 2019-04-02
Attending: EMERGENCY MEDICINE
Payer: COMMERCIAL

## 2019-04-02 VITALS
WEIGHT: 203.8 LBS | HEART RATE: 62 BPM | BODY MASS INDEX: 30.18 KG/M2 | HEIGHT: 69 IN | DIASTOLIC BLOOD PRESSURE: 86 MMHG | TEMPERATURE: 98.2 F | OXYGEN SATURATION: 96 % | SYSTOLIC BLOOD PRESSURE: 138 MMHG

## 2019-04-02 DIAGNOSIS — R10.32 LEFT LOWER QUADRANT PAIN: ICD-10-CM

## 2019-04-02 DIAGNOSIS — Z98.890 HISTORY OF ABDOMINAL SURGERY: ICD-10-CM

## 2019-04-02 LAB
ALBUMIN SERPL BCP-MCNC: 4.3 G/DL (ref 3.2–4.9)
ALBUMIN/GLOB SERPL: 2 G/DL
ALP SERPL-CCNC: 71 U/L (ref 30–99)
ALT SERPL-CCNC: 25 U/L (ref 2–50)
ANION GAP SERPL CALC-SCNC: 10 MMOL/L (ref 0–11.9)
APPEARANCE UR: CLEAR
APPEARANCE UR: CLEAR
AST SERPL-CCNC: 22 U/L (ref 12–45)
BASOPHILS # BLD AUTO: 0.6 % (ref 0–1.8)
BASOPHILS # BLD: 0.04 K/UL (ref 0–0.12)
BILIRUB SERPL-MCNC: 0.5 MG/DL (ref 0.1–1.5)
BILIRUB UR QL STRIP.AUTO: NEGATIVE
BILIRUB UR STRIP-MCNC: NEGATIVE MG/DL
BUN SERPL-MCNC: 15 MG/DL (ref 8–22)
CALCIUM SERPL-MCNC: 9.2 MG/DL (ref 8.5–10.5)
CHLORIDE SERPL-SCNC: 106 MMOL/L (ref 96–112)
CO2 SERPL-SCNC: 24 MMOL/L (ref 20–33)
COLOR UR AUTO: NORMAL
COLOR UR: YELLOW
CREAT SERPL-MCNC: 0.96 MG/DL (ref 0.5–1.4)
EOSINOPHIL # BLD AUTO: 0.19 K/UL (ref 0–0.51)
EOSINOPHIL NFR BLD: 2.7 % (ref 0–6.9)
ERYTHROCYTE [DISTWIDTH] IN BLOOD BY AUTOMATED COUNT: 41.1 FL (ref 35.9–50)
GLOBULIN SER CALC-MCNC: 2.2 G/DL (ref 1.9–3.5)
GLUCOSE SERPL-MCNC: 84 MG/DL (ref 65–99)
GLUCOSE UR STRIP.AUTO-MCNC: NEGATIVE MG/DL
GLUCOSE UR STRIP.AUTO-MCNC: NEGATIVE MG/DL
HCT VFR BLD AUTO: 45.8 % (ref 42–52)
HGB BLD-MCNC: 15.5 G/DL (ref 14–18)
IMM GRANULOCYTES # BLD AUTO: 0.04 K/UL (ref 0–0.11)
IMM GRANULOCYTES NFR BLD AUTO: 0.6 % (ref 0–0.9)
KETONES UR STRIP.AUTO-MCNC: NEGATIVE MG/DL
KETONES UR STRIP.AUTO-MCNC: NEGATIVE MG/DL
LACTATE BLD-SCNC: 1.1 MMOL/L (ref 0.5–2)
LEUKOCYTE ESTERASE UR QL STRIP.AUTO: NEGATIVE
LEUKOCYTE ESTERASE UR QL STRIP.AUTO: NEGATIVE
LIPASE SERPL-CCNC: 26 U/L (ref 11–82)
LYMPHOCYTES # BLD AUTO: 1.74 K/UL (ref 1–4.8)
LYMPHOCYTES NFR BLD: 24.5 % (ref 22–41)
MCH RBC QN AUTO: 30 PG (ref 27–33)
MCHC RBC AUTO-ENTMCNC: 33.8 G/DL (ref 33.7–35.3)
MCV RBC AUTO: 88.8 FL (ref 81.4–97.8)
MICRO URNS: NORMAL
MONOCYTES # BLD AUTO: 0.71 K/UL (ref 0–0.85)
MONOCYTES NFR BLD AUTO: 10 % (ref 0–13.4)
NEUTROPHILS # BLD AUTO: 4.38 K/UL (ref 1.82–7.42)
NEUTROPHILS NFR BLD: 61.6 % (ref 44–72)
NITRITE UR QL STRIP.AUTO: NEGATIVE
NITRITE UR QL STRIP.AUTO: NEGATIVE
NRBC # BLD AUTO: 0 K/UL
NRBC BLD-RTO: 0 /100 WBC
PH UR STRIP.AUTO: 5.5 [PH]
PH UR STRIP.AUTO: 5.5 [PH] (ref 5–8)
PLATELET # BLD AUTO: 270 K/UL (ref 164–446)
PMV BLD AUTO: 9.5 FL (ref 9–12.9)
POTASSIUM SERPL-SCNC: 3.8 MMOL/L (ref 3.6–5.5)
PROT SERPL-MCNC: 6.5 G/DL (ref 6–8.2)
PROT UR QL STRIP: NEGATIVE MG/DL
PROT UR QL STRIP: NEGATIVE MG/DL
RBC # BLD AUTO: 5.16 M/UL (ref 4.7–6.1)
RBC UR QL AUTO: NEGATIVE
RBC UR QL AUTO: NEGATIVE
SODIUM SERPL-SCNC: 140 MMOL/L (ref 135–145)
SP GR UR STRIP.AUTO: 1.02
SP GR UR STRIP.AUTO: 1.03
UROBILINOGEN UR STRIP-MCNC: 0.2 MG/DL
UROBILINOGEN UR STRIP.AUTO-MCNC: 0.2 MG/DL
WBC # BLD AUTO: 7.1 K/UL (ref 4.8–10.8)

## 2019-04-02 PROCEDURE — 80053 COMPREHEN METABOLIC PANEL: CPT

## 2019-04-02 PROCEDURE — 83690 ASSAY OF LIPASE: CPT

## 2019-04-02 PROCEDURE — 99213 OFFICE O/P EST LOW 20 MIN: CPT | Performed by: NURSE PRACTITIONER

## 2019-04-02 PROCEDURE — 85025 COMPLETE CBC W/AUTO DIFF WBC: CPT

## 2019-04-02 PROCEDURE — 83605 ASSAY OF LACTIC ACID: CPT

## 2019-04-02 PROCEDURE — 74177 CT ABD & PELVIS W/CONTRAST: CPT

## 2019-04-02 PROCEDURE — 81002 URINALYSIS NONAUTO W/O SCOPE: CPT | Performed by: NURSE PRACTITIONER

## 2019-04-02 PROCEDURE — 96375 TX/PRO/DX INJ NEW DRUG ADDON: CPT

## 2019-04-02 PROCEDURE — 700117 HCHG RX CONTRAST REV CODE 255: Performed by: EMERGENCY MEDICINE

## 2019-04-02 PROCEDURE — 99285 EMERGENCY DEPT VISIT HI MDM: CPT

## 2019-04-02 PROCEDURE — 700111 HCHG RX REV CODE 636 W/ 250 OVERRIDE (IP): Performed by: EMERGENCY MEDICINE

## 2019-04-02 PROCEDURE — 96374 THER/PROPH/DIAG INJ IV PUSH: CPT

## 2019-04-02 PROCEDURE — 81003 URINALYSIS AUTO W/O SCOPE: CPT

## 2019-04-02 RX ORDER — ONDANSETRON 2 MG/ML
4 INJECTION INTRAMUSCULAR; INTRAVENOUS EVERY 4 HOURS PRN
Status: DISCONTINUED | OUTPATIENT
Start: 2019-04-02 | End: 2019-04-03 | Stop reason: HOSPADM

## 2019-04-02 RX ORDER — MORPHINE SULFATE 4 MG/ML
4 INJECTION, SOLUTION INTRAMUSCULAR; INTRAVENOUS ONCE
Status: COMPLETED | OUTPATIENT
Start: 2019-04-02 | End: 2019-04-02

## 2019-04-02 RX ADMIN — IOHEXOL 50 ML: 240 INJECTION, SOLUTION INTRATHECAL; INTRAVASCULAR; INTRAVENOUS; ORAL at 22:32

## 2019-04-02 RX ADMIN — ONDANSETRON 4 MG: 2 INJECTION INTRAMUSCULAR; INTRAVENOUS at 22:14

## 2019-04-02 RX ADMIN — IOHEXOL 100 ML: 350 INJECTION, SOLUTION INTRAVENOUS at 22:32

## 2019-04-02 RX ADMIN — MORPHINE SULFATE 4 MG: 4 INJECTION INTRAVENOUS at 22:14

## 2019-04-02 ASSESSMENT — ENCOUNTER SYMPTOMS
HEADACHES: 0
DIZZINESS: 0
FEVER: 0
NAUSEA: 0
CHILLS: 0
ABDOMINAL PAIN: 1
DIARRHEA: 1
BACK PAIN: 1
BLOOD IN STOOL: 0
VOMITING: 0
CONSTIPATION: 0

## 2019-04-02 NOTE — ED TRIAGE NOTES
Chief Complaint   Patient presents with   • Sent from Urgent Care     left side abdominal pain. pt here for CT scan. hx colon cancer. pain in old ostomy site.

## 2019-04-02 NOTE — PROGRESS NOTES
Subjective:      Edide Daniel is a 28 y.o. male who presents with Side Pain (L side pain towards the stomach, hot flashes, stabbing pain, sensative to the touch, x1 day )            HPI New problem. 28 year old male with left sided, constant 8/10 abdominal pain on left side. This pain is radiation of pain in back. Denies fever, chills, nausea or constipation. History of multiple intestinal surgeries so he does have diarrhea. He has no myalgia. Taken ibuprofen with no relief.  Tomato and Hydrocodone-acetaminophen  Current Outpatient Prescriptions on File Prior to Visit   Medication Sig Dispense Refill   • ondansetron (ZOFRAN) 4 MG Tab tablet      • oxyCODONE-acetaminophen (PERCOCET) 7.5-325 MG per tablet      • tramadol (ULTRAM) 50 MG Tab      • cyclobenzaprine (FLEXERIL) 10 MG Tab Take 1 Tab by mouth 3 times a day as needed. (Patient not taking: Reported on 4/2/2019) 30 Tab 0   • acetaminophen (TYLENOL) 500 MG Tab Take 500-1,000 mg by mouth every 6 hours as needed.     • ondansetron (ZOFRAN ODT) 4 MG TABLET DISPERSIBLE Take 1 Tab by mouth every four hours as needed for Nausea. (Patient not taking: Reported on 4/2/2019) 12 Tab 0   • ondansetron (ZOFRAN ODT) 4 MG TABLET DISPERSIBLE Take 1 Tab by mouth every 8 hours as needed for Nausea. (Patient not taking: Reported on 4/2/2019) 10 Tab 1     No current facility-administered medications on file prior to visit.      Social History     Social History   • Marital status:      Spouse name: N/A   • Number of children: N/A   • Years of education: N/A     Occupational History   • Not on file.     Social History Main Topics   • Smoking status: Never Smoker   • Smokeless tobacco: Never Used   • Alcohol use Yes      Comment: occ    • Drug use: No   • Sexual activity: Not on file     Other Topics Concern   • Not on file     Social History Narrative   • No narrative on file     family history includes Cancer in his unknown relative.      Review of Systems  "  Constitutional: Negative for chills, fever and malaise/fatigue.   Gastrointestinal: Positive for abdominal pain and diarrhea. Negative for blood in stool, constipation, melena, nausea and vomiting.   Genitourinary: Negative.    Musculoskeletal: Positive for back pain.   Neurological: Negative for dizziness and headaches.          Objective:     /86 (BP Location: Left arm, Patient Position: Sitting, BP Cuff Size: Large adult)   Pulse 62   Temp 36.8 °C (98.2 °F) (Temporal)   Ht 1.753 m (5' 9\")   Wt 92.4 kg (203 lb 12.8 oz)   SpO2 96%   BMI 30.10 kg/m²      Physical Exam   Constitutional: He is oriented to person, place, and time. He appears well-developed and well-nourished.   Cardiovascular: Normal rate, regular rhythm and normal heart sounds.    No murmur heard.  Pulmonary/Chest: Effort normal and breath sounds normal.   Abdominal: Soft. Bowel sounds are normal. There is tenderness in the left lower quadrant. There is CVA tenderness.   Musculoskeletal: Normal range of motion.   Neurological: He is alert and oriented to person, place, and time.   Skin: Skin is warm and dry.   Psychiatric: He has a normal mood and affect.   Nursing note and vitals reviewed.              Assessment/Plan:     1. Left lower quadrant pain  POCT Urinalysis   2. History of abdominal surgery       Urine clear.  To [facility] for further evaluation due to complex abdominal history and pain level.  "

## 2019-04-03 VITALS
OXYGEN SATURATION: 94 % | RESPIRATION RATE: 18 BRPM | TEMPERATURE: 98.4 F | BODY MASS INDEX: 31.44 KG/M2 | HEART RATE: 57 BPM | HEIGHT: 69 IN | SYSTOLIC BLOOD PRESSURE: 120 MMHG | DIASTOLIC BLOOD PRESSURE: 77 MMHG | WEIGHT: 212.3 LBS

## 2019-04-03 RX ORDER — NAPROXEN 500 MG/1
500 TABLET ORAL 2 TIMES DAILY WITH MEALS
Qty: 30 TAB | Refills: 0 | Status: SHIPPED | OUTPATIENT
Start: 2019-04-03 | End: 2019-04-06

## 2019-04-03 NOTE — ED NOTES
Pt given discharge instructions and prescription. Reviewed, had no questions for RN. VSS. PIV dc'd, dressing in place. Ambulatory to ED exit with family, steady on feet.

## 2019-04-03 NOTE — ED PROVIDER NOTES
"                                                                          ED Provider Note    Scribed for Daniele Wall M.D. by Marlen Taylor. 4/2/2019  8:20 PM    CHIEF COMPLAINT  Chief Complaint   Patient presents with   • Sent from Urgent Care     left side abdominal pain. pt here for CT scan. hx colon cancer. pain in old ostomy site.        HPI  Eddie Daniel is a 28 y.o. Male with a history of colon cancer who presents with left quadrant pain onset this morning around 0200. Patient was sleeping when he woke up to sudden pain described as \"swollen and stabbing\" in nature, and exacerbated upon walking and applied pressure. He notes the pain is alleviated when lying down. The patient additionally notes the pain is radiating toward abdominal surgical scars in left quadrant. He denies any nausea, vomiting, hematochezia, dysuria, or hematuria. He reports no similar symptoms in the past.    Patient notes diarrhea, but states this is baseline secondary to colon cancer. 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     The patient reports no active chemotherapy, recent abdominal surgeries, or recent sick contact. Patient states he has a history of blood clots to his lower extremities following past knee surgery.   He reports receiving the influenza vaccine this season.     REVIEW OF SYSTEMS  Constitutional: No fevers, chills.   Skin: No rashes or diaphoresis.  Eyes: No change in vision, no discharge.  ENT: No hearing change. No rhinorrhea or nasal congestion, no ST or difficulty swallowing.  Respiratory: Chronic cough. No SOB. No hemoptysis. No Wheezing.  Cardiac: No CP, palpitations, edema. No PND or orthopnea.  GI: left quadrant pain, diarrhea; N/V; constipation. No blood in stool.  : No dysuria. No hematuria. No D/C. No frequency or urgency. No hesitancy.   MSK: No pain in joints or muscles. No calf pain or swelling.  Neuro: No HA or " paresthesias. No focal weakness.  Endocrine: No polyuria or polydipsia. No heat or cold intolerance.  Heme: No easy bruising. No history of bleeding disorders or anemia.  See HPI for further details. All other systems are negative.       PAST MEDICAL HISTORY   has a past medical history of Cancer (HCC) (2011); Colon cancer (HCC) (2011); Colon polyps; Dental disorder; Heart burn; Indigestion; MRSA (methicillin resistant Staphylococcus aureus) (05/2011); Other specified disorder of intestines; Pain; and Snoring.    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    CURRENT MEDICATIONS  Home Medications     Reviewed by Jil Esquivel R.N. (Registered Nurse) on 04/02/19 at [email or web address]  Med List Status: Complete   Medication Last Dose Status   acetaminophen (TYLENOL) 500 MG Tab not taking Active   cyclobenzaprine (FLEXERIL) 10 MG Tab not taking Active   ondansetron (ZOFRAN ODT) 4 MG TABLET DISPERSIBLE not taking Active   ondansetron (ZOFRAN ODT) 4 MG TABLET DISPERSIBLE not taking Active   ondansetron (ZOFRAN) 4 MG Tab tablet not taking Active   oxyCODONE-acetaminophen (PERCOCET) 7.5-325 MG per tablet not taking Active   tramadol (ULTRAM) 50 MG Tab not taking Active                ALLERGIES  Allergies   Allergen Reactions   • Tomato Hives     Fresh only   • Hydrocodone-Acetaminophen Rash and Vomiting     Rash on neck and nausea/vomiting.       PHYSICAL EXAM  VITAL SIGNS: /77   Pulse 73   Temp 36.9 °C  "(98.4 °F) (Temporal)   Resp 14   Ht 1.753 m (5' 9\")   Wt 96.3 kg (212 lb 4.9 oz)   SpO2 97%   BMI 31.35 kg/m²      Pulse ox interpretation: I interpret this pulse ox as normal.  Genl: Male sitting in chair comfortably, speaking clearly, appears in no acute distress. Well-appearing.   Head: NC/AT.   ENT: Mucous membranes moist, posterior pharynx clear, uvula midline, nares patent bilaterally.  Eyes: Normal sclera, pupils equal round reactive to light  Neck: Supple, FROM, no LAD appreciated.  Pulmonary: Lungs are clear to auscultation bilaterally  Chest: Chest wall tenderness to lateral aspect of lower left side that extends into abdomen.   CV:  RRR, no murmur appreciated, pulses 2+ in both upper and lower extremities,  Abdomen: Multiple well-healing surgical scars throughout abdomen. Regional tenderness to left lateral flank. soft, ND; no rebound/guarding, no masses palpated, no HSM.   : no suprapubic tenderness.   Musculoskeletal: Pain free ROM of the neck. Moving upper and lower extremities and spontaneous in coordinated fashion  Neuro: A&Ox4 (person, place, time, situation), speech fluent, gait steady, no focal deficits appreciated, No cerebellar signs. Sensation is grossly intact in the distal upper and lower extremities  5/5 strength in  and dorsiflexion/plantar flexion of the ankles  Psych: Patient has an appropriate affect and behavior.  Skin: No rash or lesions.  No pallor or jaundice.  No cyanosis.  Warm and dry.      DIAGNOSTIC STUDIES / PROCEDURES    LABS  Results for orders placed or performed during the hospital encounter of 04/02/19   CBC WITH DIFFERENTIAL   Result Value Ref Range    WBC 7.1 4.8 - 10.8 K/uL    RBC 5.16 4.70 - 6.10 M/uL    Hemoglobin 15.5 14.0 - 18.0 g/dL    Hematocrit 45.8 42.0 - 52.0 %    MCV 88.8 81.4 - 97.8 fL    MCH 30.0 27.0 - 33.0 pg    MCHC 33.8 33.7 - 35.3 g/dL    RDW 41.1 35.9 - 50.0 fL    Platelet Count 270 164 - 446 K/uL    MPV 9.5 9.0 - 12.9 fL    " Neutrophils-Polys 61.60 44.00 - 72.00 %    Lymphocytes 24.50 22.00 - 41.00 %    Monocytes 10.00 0.00 - 13.40 %    Eosinophils 2.70 0.00 - 6.90 %    Basophils 0.60 0.00 - 1.80 %    Immature Granulocytes 0.60 0.00 - 0.90 %    Nucleated RBC 0.00 /100 WBC    Neutrophils (Absolute) 4.38 1.82 - 7.42 K/uL    Lymphs (Absolute) 1.74 1.00 - 4.80 K/uL    Monos (Absolute) 0.71 0.00 - 0.85 K/uL    Eos (Absolute) 0.19 0.00 - 0.51 K/uL    Baso (Absolute) 0.04 0.00 - 0.12 K/uL    Immature Granulocytes (abs) 0.04 0.00 - 0.11 K/uL    NRBC (Absolute) 0.00 K/uL   COMP METABOLIC PANEL   Result Value Ref Range    Sodium 140 135 - 145 mmol/L    Potassium 3.8 3.6 - 5.5 mmol/L    Chloride 106 96 - 112 mmol/L    Co2 24 20 - 33 mmol/L    Anion Gap 10.0 0.0 - 11.9    Glucose 84 65 - 99 mg/dL    Bun 15 8 - 22 mg/dL    Creatinine 0.96 0.50 - 1.40 mg/dL    Calcium 9.2 8.5 - 10.5 mg/dL    AST(SGOT) 22 12 - 45 U/L    ALT(SGPT) 25 2 - 50 U/L    Alkaline Phosphatase 71 30 - 99 U/L    Total Bilirubin 0.5 0.1 - 1.5 mg/dL    Albumin 4.3 3.2 - 4.9 g/dL    Total Protein 6.5 6.0 - 8.2 g/dL    Globulin 2.2 1.9 - 3.5 g/dL    A-G Ratio 2.0 g/dL   LIPASE   Result Value Ref Range    Lipase 26 11 - 82 U/L   URINALYSIS,CULTURE IF INDICATED   Result Value Ref Range    Color Yellow     Character Clear     Specific Gravity 1.020 <1.035    Ph 5.5 5.0 - 8.0    Glucose Negative Negative mg/dL    Ketones Negative Negative mg/dL    Protein Negative Negative mg/dL    Bilirubin Negative Negative    Urobilinogen, Urine 0.2 Negative    Nitrite Negative Negative    Leukocyte Esterase Negative Negative    Occult Blood Negative Negative    Micro Urine Req see below    ESTIMATED GFR   Result Value Ref Range    GFR If African American >60 >60 mL/min/1.73 m 2    GFR If Non African American >60 >60 mL/min/1.73 m 2   LACTIC ACID   Result Value Ref Range    Lactic Acid 1.1 0.5 - 2.0 mmol/L     RADIOLOGY  CT-ABDOMEN-PELVIS WITH   Final Result         1.  No acute abnormality.   2.   Hepatomegaly and diffuse hepatic steatosis   3.  Small fat-containing umbilical hernia.   4.  Linear density in the subcutaneous fat at site of prior ostomy, appearance most compatible with scarring.            COURSE & MEDICAL DECISION MAKING  Pertinent Labs & Imaging studies reviewed. (See chart for details)    Differential diagnoses include but not limited to: cystitis, pyelonephritis, nephrolithiasis, bowel obstruction, colitis, enteritis, viral syndrome, dehydration.    3:41 PM Ordered UA culture, lipase, CMP, and CBC to evaluate.     4:29 PM Ordered estimated GFR to evaluate.     8:20 PM - Patient seen and examined at bedside. He is sitting comfortably in bed. The patient consents to imaging studies and labs.     8:31 PM Ordered CT abdomen pelvis with contrast and lactic acid to evaluate his symptoms. Patient will be treated with Omnipaque 350 mg/mL.     11:30 PM Reviewed lab and radiology results.     Medical Decision Making:   Patient presents emergency room for the symptoms as described above.  At the time of my initial evaluation and was concerned about the possibility of a complicating intra-abdominal process due to his profound history of prior abdominal surgeries and increased likelihood of bowel obstruction, volvulus or infectious process.  He has easily reproducible tenderness on the left side with normal vital signs laboratory results obtained in triage that showed no gross metabolic derangement, no leukocytosis or left shift, and normal lactate.  Due to the concerns of an underlying surgical issues, CT of the abdomen and pelvis with IV and oral contrast is obtained.  This resulted no acute abnormalities spacing slight hepatomegaly or steatosis and a small fat-containing umbilical hernia.  There is a linear density noted in the subcutaneous fat around his prior ostomy site which is the same distribution of his pain complaints.  All this is compatible with scarring this could represent a  neurological/neuropathic sources of pain.  His urine is shows no signs of acute infectious etiology or stones.  His serial abdominal exams demonstrated no signs of sacha peritonitis.  After results of the labs and imaging, patient is able to tolerate p.o. intake, is ambulated without assistance, given explanation regarding the workup and the lack of acute findings at this time.  I recommend he try course of anti-inflammatories 3 days in addition to heat at the site of pain.  If he has worsening fevers, abdominal distention, nausea vomiting changes to his bowel movements he should return for prompt reevaluation.  Questions are addressed and he is discharged home in stable condition.    The patient will not drink alcohol nor drive with prescribed medications. The patient will return for worsening symptoms and is stable at the time of discharge. The patient verbalizes understanding and will comply.     FINAL IMPRESSION  Visit Diagnoses     ICD-10-CM   1. Left lower quadrant pain R10.32     Marlen MAYES (Fabián), am scribing for, and in the presence of, Daniele Wall M.D..    Electronically signed by: Marlen Taylor (Fabián), 4/2/2019    Daniele MAYES M.D. personally performed the services described in this documentation, as scribed by Marlen Taylor in my presence, and it is both accurate and complete. C.     The note accurately reflects work and decisions made by me.  Daniele Wall  4/3/2019  12:16 AM

## 2019-04-03 NOTE — ED NOTES
Patient ambulatory back to room and placed on monitor. Family at bedside. Updated on POC. All needs met.

## 2019-04-03 NOTE — ED TRIAGE NOTES
Rounded on pt.  Apologized for the wait.  Pt denies any new or worsening symptoms.  Sitting in senior lounge with family.

## 2019-04-05 ENCOUNTER — OFFICE VISIT (OUTPATIENT)
Dept: MEDICAL GROUP | Facility: PHYSICIAN GROUP | Age: 29
End: 2019-04-05
Payer: COMMERCIAL

## 2019-04-05 VITALS
OXYGEN SATURATION: 96 % | TEMPERATURE: 98.7 F | HEART RATE: 67 BPM | DIASTOLIC BLOOD PRESSURE: 64 MMHG | WEIGHT: 203 LBS | BODY MASS INDEX: 30.07 KG/M2 | SYSTOLIC BLOOD PRESSURE: 116 MMHG | HEIGHT: 69 IN

## 2019-04-05 DIAGNOSIS — R19.7 DIARRHEA, UNSPECIFIED TYPE: ICD-10-CM

## 2019-04-05 DIAGNOSIS — F41.9 ANXIETY: ICD-10-CM

## 2019-04-05 DIAGNOSIS — Z87.898 HISTORY OF ABDOMINAL PAIN: ICD-10-CM

## 2019-04-05 DIAGNOSIS — D13.91 FAMILIAL POLYPOSIS OF COLON: ICD-10-CM

## 2019-04-05 DIAGNOSIS — M75.101 TEAR OF RIGHT ROTATOR CUFF, UNSPECIFIED TEAR EXTENT: ICD-10-CM

## 2019-04-05 DIAGNOSIS — Z13.228 ENCOUNTER FOR SCREENING FOR METABOLIC DISORDER: ICD-10-CM

## 2019-04-05 DIAGNOSIS — Z80.0 FAMILY HISTORY OF COLON CANCER: ICD-10-CM

## 2019-04-05 DIAGNOSIS — Z87.19 HISTORY OF SMALL BOWEL OBSTRUCTION: ICD-10-CM

## 2019-04-05 DIAGNOSIS — K21.9 GASTROESOPHAGEAL REFLUX DISEASE, ESOPHAGITIS PRESENCE NOT SPECIFIED: ICD-10-CM

## 2019-04-05 PROBLEM — K58.0 IRRITABLE BOWEL SYNDROME WITH DIARRHEA: Status: ACTIVE | Noted: 2019-04-05

## 2019-04-05 PROCEDURE — 99204 OFFICE O/P NEW MOD 45 MIN: CPT | Performed by: INTERNAL MEDICINE

## 2019-04-05 ASSESSMENT — PATIENT HEALTH QUESTIONNAIRE - PHQ9: CLINICAL INTERPRETATION OF PHQ2 SCORE: 0

## 2019-04-05 NOTE — ASSESSMENT & PLAN NOTE
Recent ER visit with abdominal pain, CT abdomen was negative for any acute pathology.  There was some scarring at the place of ileostomy site for which he was given an NSAIDs course and no more pain at this time.

## 2019-04-05 NOTE — ASSESSMENT & PLAN NOTE
This is a chronic health problem that is uncontrolled with current medications and lifestyle measures. Pt has been facing this since 2012 and has ongoing diarrhea everyday. Has stress at home and also post surgical effect. Does have blood in the stools which is sacha blood seen in the toilet around 5x/week.

## 2019-04-05 NOTE — ASSESSMENT & PLAN NOTE
This is a chronic health problem that is well controlled with current medications and lifestyle measures. Takes OTC Tums which he takes PRN and helps him.

## 2019-04-05 NOTE — ASSESSMENT & PLAN NOTE
This is a chronic health problem that is well controlled with current medications and lifestyle measures.Previous history of small bowel obstruction, status post ileostomy and resection.

## 2019-04-05 NOTE — ASSESSMENT & PLAN NOTE
This is a chronic health problem that is well controlled with current medications and lifestyle measures. Had recent Arthroscopy in 12/2018. Currently on Advil PRN for 1-2x/week.

## 2019-04-06 NOTE — ASSESSMENT & PLAN NOTE
This is a chronic health problem that is well controlled with current medications and lifestyle measures. Pt has stress at home but good with family support.
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
## 2019-04-10 ENCOUNTER — PHYSICAL THERAPY (OUTPATIENT)
Dept: PHYSICAL THERAPY | Facility: REHABILITATION | Age: 29
End: 2019-04-10
Attending: ORTHOPAEDIC SURGERY
Payer: COMMERCIAL

## 2019-04-10 DIAGNOSIS — S43.431S GLENOID LABRAL TEAR, RIGHT, SEQUELA: ICD-10-CM

## 2019-04-10 DIAGNOSIS — Z98.890 STATUS POST REPAIR OF GLENOID LABRUM: ICD-10-CM

## 2019-04-10 PROCEDURE — 97110 THERAPEUTIC EXERCISES: CPT

## 2019-04-10 NOTE — OP THERAPY DAILY TREATMENT
"  Outpatient Physical Therapy  DAILY TREATMENT     [facility] [address].  [address]  [place] [address]  Phone:  [phone]  Fax:  [phone]    Date: 04/10/2019     Patient: Eddie Daniel  YOB: 1990  MRN: [id number]     Time Calculation  Start time: 1700  Stop time: 1730 Time Calculation (min): 30 minutes     Chief Complaint: No chief complaint on file.    Visit #: 23    SUBJECTIVE:  Shoulder feels fine today. Slight pinching pain posterior R shoulder with elevation >90. \"feels sore\".    OBJECTIVE:        Therapeutic Exercises (CPT 00186):     1. Standing UBE, level 2, x4 min , 1min alt forward/reverse    2. On green foam roller in hooklying, W's, bilateral ER with pink band, [place], alternating UE flexion, 15 ea. x 1    4. Sh IR isometric hold with pink tband and step away, 2x10    5. Sh ER isometric hold with pink tband and step away, 2x10    6. Rows with 25# pulley, 20 x 2    7. Bicep curls eccentric focus 8#, 12 x 2    8. Scaption standing at wall R only 3#, 10 x 1, 8 x 1    9. Flexion standing at wall R only 3# , 10 x 1, 8 x 1      Time-based treatments/modalities:  Therapeutic exercise minutes (CPT 77536): 28 minutes         ASSESSMENT:   Response to treatment:  Cueing required for scapulohumeral rhythm above 90 deg FF.     PLAN/RECOMMENDATIONS:   Plan for treatment: therapy treatment to continue next visit.  Planned interventions for next visit: continue with current treatment.      "

## 2019-05-13 ENCOUNTER — APPOINTMENT (OUTPATIENT)
Dept: URGENT CARE | Facility: CLINIC | Age: 29
End: 2019-05-13
Payer: COMMERCIAL

## 2019-05-13 ENCOUNTER — HOSPITAL ENCOUNTER (EMERGENCY)
Facility: MEDICAL CENTER | Age: 29
End: 2019-05-14
Attending: EMERGENCY MEDICINE
Payer: COMMERCIAL

## 2019-05-13 ENCOUNTER — APPOINTMENT (OUTPATIENT)
Dept: RADIOLOGY | Facility: MEDICAL CENTER | Age: 29
End: 2019-05-13
Attending: EMERGENCY MEDICINE
Payer: COMMERCIAL

## 2019-05-13 DIAGNOSIS — K68.3 RETROPERITONEAL HEMATOMA: ICD-10-CM

## 2019-05-13 DIAGNOSIS — R04.2 HEMOPTYSIS: ICD-10-CM

## 2019-05-13 LAB
ALBUMIN SERPL BCP-MCNC: 3.6 G/DL (ref 3.2–4.9)
ALBUMIN/GLOB SERPL: 1.4 G/DL
ALP SERPL-CCNC: 61 U/L (ref 30–99)
ALT SERPL-CCNC: 38 U/L (ref 2–50)
ANION GAP SERPL CALC-SCNC: 6 MMOL/L (ref 0–11.9)
AST SERPL-CCNC: 35 U/L (ref 12–45)
BASOPHILS # BLD AUTO: 1.1 % (ref 0–1.8)
BASOPHILS # BLD: 0.06 K/UL (ref 0–0.12)
BILIRUB SERPL-MCNC: 0.6 MG/DL (ref 0.1–1.5)
BUN SERPL-MCNC: 13 MG/DL (ref 8–22)
CALCIUM SERPL-MCNC: 8 MG/DL (ref 8.4–10.2)
CHLORIDE SERPL-SCNC: 104 MMOL/L (ref 96–112)
CO2 SERPL-SCNC: 24 MMOL/L (ref 20–33)
CREAT SERPL-MCNC: 0.89 MG/DL (ref 0.5–1.4)
EOSINOPHIL # BLD AUTO: 0.28 K/UL (ref 0–0.51)
EOSINOPHIL NFR BLD: 5.2 % (ref 0–6.9)
ERYTHROCYTE [DISTWIDTH] IN BLOOD BY AUTOMATED COUNT: 40.4 FL (ref 35.9–50)
GLOBULIN SER CALC-MCNC: 2.5 G/DL (ref 1.9–3.5)
GLUCOSE SERPL-MCNC: 92 MG/DL (ref 65–99)
HCT VFR BLD AUTO: 36.7 % (ref 42–52)
HGB BLD-MCNC: 12.7 G/DL (ref 14–18)
IMM GRANULOCYTES # BLD AUTO: 0.02 K/UL (ref 0–0.11)
IMM GRANULOCYTES NFR BLD AUTO: 0.4 % (ref 0–0.9)
LYMPHOCYTES # BLD AUTO: 1.75 K/UL (ref 1–4.8)
LYMPHOCYTES NFR BLD: 32.6 % (ref 22–41)
MCH RBC QN AUTO: 29.9 PG (ref 27–33)
MCHC RBC AUTO-ENTMCNC: 34.6 G/DL (ref 33.7–35.3)
MCV RBC AUTO: 86.4 FL (ref 81.4–97.8)
MONOCYTES # BLD AUTO: 0.57 K/UL (ref 0–0.85)
MONOCYTES NFR BLD AUTO: 10.6 % (ref 0–13.4)
NEUTROPHILS # BLD AUTO: 2.69 K/UL (ref 1.82–7.42)
NEUTROPHILS NFR BLD: 50.1 % (ref 44–72)
NRBC # BLD AUTO: 0 K/UL
NRBC BLD-RTO: 0 /100 WBC
PLATELET # BLD AUTO: 210 K/UL (ref 164–446)
PMV BLD AUTO: 8.9 FL (ref 9–12.9)
POTASSIUM SERPL-SCNC: 3.3 MMOL/L (ref 3.6–5.5)
PROT SERPL-MCNC: 6.1 G/DL (ref 6–8.2)
RBC # BLD AUTO: 4.25 M/UL (ref 4.7–6.1)
SODIUM SERPL-SCNC: 134 MMOL/L (ref 135–145)
WBC # BLD AUTO: 5.4 K/UL (ref 4.8–10.8)

## 2019-05-13 PROCEDURE — 80053 COMPREHEN METABOLIC PANEL: CPT

## 2019-05-13 PROCEDURE — 85025 COMPLETE CBC W/AUTO DIFF WBC: CPT

## 2019-05-13 PROCEDURE — 700117 HCHG RX CONTRAST REV CODE 255: Performed by: EMERGENCY MEDICINE

## 2019-05-13 PROCEDURE — 700111 HCHG RX REV CODE 636 W/ 250 OVERRIDE (IP): Performed by: EMERGENCY MEDICINE

## 2019-05-13 PROCEDURE — 96374 THER/PROPH/DIAG INJ IV PUSH: CPT

## 2019-05-13 PROCEDURE — 99285 EMERGENCY DEPT VISIT HI MDM: CPT

## 2019-05-13 PROCEDURE — 94760 N-INVAS EAR/PLS OXIMETRY 1: CPT

## 2019-05-13 PROCEDURE — 96375 TX/PRO/DX INJ NEW DRUG ADDON: CPT

## 2019-05-13 PROCEDURE — 72125 CT NECK SPINE W/O DYE: CPT

## 2019-05-13 PROCEDURE — 71260 CT THORAX DX C+: CPT

## 2019-05-13 RX ORDER — HYDROMORPHONE HYDROCHLORIDE 1 MG/ML
1 INJECTION, SOLUTION INTRAMUSCULAR; INTRAVENOUS; SUBCUTANEOUS ONCE
Status: COMPLETED | OUTPATIENT
Start: 2019-05-13 | End: 2019-05-13

## 2019-05-13 RX ORDER — ONDANSETRON 4 MG/1
4 TABLET, ORALLY DISINTEGRATING ORAL ONCE
Status: COMPLETED | OUTPATIENT
Start: 2019-05-13 | End: 2019-05-13

## 2019-05-13 RX ADMIN — HYDROMORPHONE HYDROCHLORIDE 1 MG: 1 INJECTION, SOLUTION INTRAMUSCULAR; INTRAVENOUS; SUBCUTANEOUS at 23:00

## 2019-05-13 RX ADMIN — IOHEXOL 100 ML: 350 INJECTION, SOLUTION INTRAVENOUS at 23:17

## 2019-05-13 RX ADMIN — ONDANSETRON 4 MG: 4 TABLET, ORALLY DISINTEGRATING ORAL at 23:00

## 2019-05-14 VITALS
OXYGEN SATURATION: 96 % | DIASTOLIC BLOOD PRESSURE: 80 MMHG | WEIGHT: 207.45 LBS | SYSTOLIC BLOOD PRESSURE: 127 MMHG | BODY MASS INDEX: 30.73 KG/M2 | TEMPERATURE: 97.9 F | HEART RATE: 61 BPM | RESPIRATION RATE: 20 BRPM | HEIGHT: 69 IN

## 2019-05-14 ASSESSMENT — ENCOUNTER SYMPTOMS
NAUSEA: 0
CHILLS: 0
COUGH: 1
VOMITING: 0
HEMOPTYSIS: 1
SHORTNESS OF BREATH: 1
ABDOMINAL PAIN: 0
FEVER: 0

## 2019-05-14 NOTE — ED NOTES
The pt declined a work note.  D/c inst reviewed w/ the pt to include sx of bleeding.  Pain management.  Return as needed.  The pt verb understanding and denies questions.  The pt amb out of the ed w/o diff.
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
## 2019-06-07 ENCOUNTER — OFFICE VISIT (OUTPATIENT)
Dept: MEDICAL GROUP | Facility: MEDICAL CENTER | Age: 29
End: 2019-06-07
Payer: COMMERCIAL

## 2019-06-07 VITALS
DIASTOLIC BLOOD PRESSURE: 68 MMHG | OXYGEN SATURATION: 95 % | WEIGHT: 198.85 LBS | SYSTOLIC BLOOD PRESSURE: 100 MMHG | HEART RATE: 57 BPM | HEIGHT: 69 IN | BODY MASS INDEX: 29.45 KG/M2 | TEMPERATURE: 98.1 F

## 2019-06-07 DIAGNOSIS — Z91.09 ENVIRONMENTAL ALLERGIES: ICD-10-CM

## 2019-06-07 DIAGNOSIS — Z85.038 HISTORY OF COLON CANCER: ICD-10-CM

## 2019-06-07 DIAGNOSIS — Z30.09 VASECTOMY EVALUATION: ICD-10-CM

## 2019-06-07 DIAGNOSIS — R19.7 DIARRHEA, UNSPECIFIED TYPE: ICD-10-CM

## 2019-06-07 PROCEDURE — 99214 OFFICE O/P EST MOD 30 MIN: CPT | Performed by: FAMILY MEDICINE

## 2019-06-07 RX ORDER — MONTELUKAST SODIUM 10 MG/1
10 TABLET ORAL DAILY
Qty: 30 TAB | Refills: 2 | Status: SHIPPED | OUTPATIENT
Start: 2019-06-07 | End: 2019-09-19 | Stop reason: SDUPTHER

## 2019-06-07 RX ORDER — AZELASTINE 1 MG/ML
1 SPRAY, METERED NASAL 2 TIMES DAILY
Qty: 30 ML | Refills: 2 | Status: SHIPPED | OUTPATIENT
Start: 2019-06-07 | End: 2020-02-18 | Stop reason: SDUPTHER

## 2019-06-07 NOTE — ASSESSMENT & PLAN NOTE
Chronic problem since bowel resection for colon cancer. Has diarrhea anywhere from 2-6 times per day, sometimes more.

## 2019-06-07 NOTE — PROGRESS NOTES
Subjective:     CC: Diagnoses of Environmental allergies, Diarrhea, unspecified type, History of colon cancer, and Vasectomy evaluation were pertinent to this visit.    HPI: Patient is a 29 y.o. male established patient who presents today to establish care and discuss following.      Environmental allergies  This is a chronic for this patient.  He generally has allergies in the spring/summer.  He states this year is the worst he has had.  His symptoms have been going on for the past 3 weeks. Mostly sinus congestion and face discomfort/fullness.  He states he is tried Flonase in the past, last tried it last year with no improvement.  He does not do any nasal rinses.  Denies any fevers.    Diarrhea  Chronic problem since bowel resection for colon cancer. Has diarrhea anywhere from 2-6 times per day, sometimes more.  He requests a note for work today for this problem.    History of colon cancer  Last surgery was 8 yrs ago. Last colonoscopy was around the same time. No repeat colonoscopy since then.  Unfortunately, the patient did not have health insurance so he is still paying off his medical bills from [facility] so is unable to be seen there.       Past Medical History:   Diagnosis Date   • Cancer ([facility]) 2011    colon   • Colon cancer ([facility]) 2011   • Colon polyps    • Dental disorder     right upper broken tooth   • Heart burn    • Indigestion    • MRSA (methicillin resistant Staphylococcus aureus) 05/2011   • Other specified disorder of intestines     J Pouch   • Pain     right knee   • Snoring     sleep apnea questionairre completed       Social History   Substance Use Topics   • Smoking status: Never Smoker   • Smokeless tobacco: Never Used   • Alcohol use Yes      Comment: occ        Current Outpatient Prescriptions Ordered in [facility]   Medication Sig Dispense Refill   • Cetirizine HCl (ZYRTEC ALLERGY) 10 MG Cap Take  by mouth.     • montelukast (SINGULAIR) 10 MG Tab Take 1 Tab by mouth every day. 30 Tab 2   • azelastine  "(ASTELIN) 137 MCG/SPRAY nasal spray [place] 1 Spray in nose 2 times a day. 30 mL 2     No current Epic-ordered facility-administered medications on file.        Allergies:  Tomato and Hydrocodone-acetaminophen    Health Maintenance: Completed    ROS:  Gen: no fevers/chill, no changes in weight  Eyes: no changes in vision  ENT: no sore throat, no hearing loss, no bloody nose  Pulm: no sob, no cough  CV: no chest pain, no palpitations  GI: no nausea/vomiting, no diarrhea  : no dysuria  MSk: no myalgias  Skin: no rash  Neuro: no headaches, no numbness/tingling  Heme/Lymph: no easy bruising      Objective:       Exam:  /68 (BP Location: Left arm, Patient Position: Sitting, BP Cuff Size: Adult long)   Pulse (!) 57   Temp 36.7 °C (98.1 °F)   Ht 1.753 m (5' 9\")   Wt 90.2 kg (198 lb 13.7 oz)   SpO2 95%   BMI 29.37 kg/m²  Body mass index is 29.37 kg/m².    General: Normal appearing. No distress.  HEAD: NCAT  EYES: conjunctiva clear, lids without ptosis, pupils equal and reactive to light  EARS: ears normal shape and contour.  MOUTH: normal dentition   Neck:  Normal ROM  Pulmonary: Normal effort. Normal respiratory rate.  Cardiovascular: Well perfused. No LE edema  Neurologic: Grossly normal, no focal deficits  Skin: Warm and dry.  No obvious lesions.  Musculoskeletal: Normal gait and station.   Psych: Normal mood and affect. Alert and oriented x3. Judgment and insight is normal.       Labs: 5/13/2019 results reviewed and discussed with the patient, questions answered.    Assessment & Plan:     29 y.o. male with the following -     1. Environmental allergies  New problem.  Currently taking Claritin with no significant improvement.  Prescription given for montelukast.  I also advised patient to start doing a nasal saline rinse at night followed by Flonase and Astelin nasal spray.  Plan to follow-up if no improvement.  - azelastine (ASTELIN) 137 MCG/SPRAY nasal spray; Spray 1 Spray in nose 2 times a day.  Dispense: " 30 mL; Refill: 2  - montelukast (SINGULAIR) 10 MG Tab; Take 1 Tab by mouth every day.  Dispense: 30 Tab; Refill: 2    2. Diarrhea, unspecified type  Chronic problem.  Due to history of colon resection due to colon cancer.  Letter given to the patient for work.    3. History of colon cancer  Chronic problem.  The patient has not been seen by GI in about 8 years.  No colonoscopy in the past 8 years.  Referral placed to [facility] today.  Unfortunately, [facility] was unable to [facility] as he did not have insurance and has medical bills to pay there.  - REFERRAL TO GASTROENTEROLOGY    4. Vasectomy evaluation    - REFERRAL TO UROLOGY      No Follow-up on file.    Please note that this dictation was created using voice recognition software. I have made every reasonable attempt to correct obvious errors, but I expect that there are errors of grammar and possibly content that I did not discover before finalizing the note.

## 2019-06-07 NOTE — LETTER
[facility]     June 7, 2019    Patient: Eddie Daniel   YOB: 1990   Date of Visit: 6/7/2019       To Whom It May Concern:    Eddie Daniel was seen and treated in our department on 6/7/2019.     Due to his chronic medical conditions he must have access to the restroom multiple times throughout the day.    If you have any questions please don't hesitate to call.     Sincerely,     Jessica Gutierrez M.D.

## 2019-06-07 NOTE — ASSESSMENT & PLAN NOTE
Last surgery was 8 yrs ago. Last colonoscopy was around the same time. No repeat colonoscopy since then. Still paying off his medical bills so is unable to be seen by [facility].

## 2019-06-07 NOTE — ASSESSMENT & PLAN NOTE
The patient reports severe seasonal allergies for the past 3 weeks. Mostly sinus congestion and face discomfort/fullness.

## 2019-07-01 ENCOUNTER — TELEPHONE (OUTPATIENT)
Dept: PHYSICAL THERAPY | Facility: REHABILITATION | Age: 29
End: 2019-07-01

## 2019-07-01 NOTE — OP THERAPY DISCHARGE SUMMARY
Outpatient Physical Therapy  DISCHARGE SUMMARY NOTE      [facility] [address]  [address]  [facility] [address]  Phone:  [phone]  Fax:  [phone]    Date of Visit: 07/01/2019    Patient: Eddie Daniel  YOB: 1990  MRN: [id number]     Referring Provider: Al Isabel M.D.  [address]  [address]  [place] [address]   Referring Diagnosis Anterior dislocation of right humerus, initial encounter [S43.014A]     Physical Therapy Occurrence Codes    Date of onset of impairment:  11/18/18   Date physical therapy care plan established or reviewed:  12/26/18   Date physical therapy treatment started:  12/26/18          Your patient is being discharged from Physical Therapy with the following comments:   · Goals partially met  · Patient has failed to schedule or reschedule follow-up visits    Comments:  Pt has not returned for follow up treatment since 4/10.       Gia Sainz, PT    Date: 7/1/2019

## 2019-08-14 ENCOUNTER — HOSPITAL ENCOUNTER (OUTPATIENT)
Dept: LAB | Facility: MEDICAL CENTER | Age: 29
End: 2019-08-14
Payer: COMMERCIAL

## 2019-08-14 LAB
BDY FAT % MEASURED: 20.3 %
BP DIAS: 77 MMHG
BP SYS: 124 MMHG
CHOLEST SERPL-MCNC: 190 MG/DL (ref 100–199)
DIABETES HTDIA: NO
EVENT NAME HTEVT: NORMAL
FASTING HTFAS: YES
FASTING STATUS PATIENT QL REPORTED: NORMAL
GLUCOSE SERPL-MCNC: 89 MG/DL (ref 65–99)
HDLC SERPL-MCNC: 35 MG/DL
HYPERTENSION HTHYP: NO
LDLC SERPL CALC-MCNC: ABNORMAL MG/DL
SCREENING LOC CITY HTCIT: NORMAL
SCREENING LOC STATE HTSTA: NORMAL
SCREENING LOCATION HTLOC: NORMAL
SMOKING HTSMO: NO
SUBSCRIBER ID HTSID: NORMAL
TRIGL SERPL-MCNC: 601 MG/DL (ref 0–149)

## 2019-08-14 PROCEDURE — 80061 LIPID PANEL: CPT

## 2019-08-14 PROCEDURE — S5190 WELLNESS ASSESSMENT BY NONPH: HCPCS

## 2019-08-14 PROCEDURE — 36415 COLL VENOUS BLD VENIPUNCTURE: CPT

## 2019-08-14 PROCEDURE — 82947 ASSAY GLUCOSE BLOOD QUANT: CPT

## 2019-09-19 DIAGNOSIS — R19.7 DIARRHEA, UNSPECIFIED TYPE: ICD-10-CM

## 2019-09-19 RX ORDER — MONTELUKAST SODIUM 10 MG/1
TABLET ORAL
Qty: 30 TAB | Refills: 2 | Status: ON HOLD
Start: 2019-09-19 | End: 2020-08-06 | Stop reason: CLARIF

## 2019-09-24 ENCOUNTER — IMMUNIZATION (OUTPATIENT)
Dept: OCCUPATIONAL MEDICINE | Facility: CLINIC | Age: 29
End: 2019-09-24

## 2019-09-24 DIAGNOSIS — Z23 NEED FOR VACCINATION: ICD-10-CM

## 2019-09-24 PROCEDURE — 90686 IIV4 VACC NO PRSV 0.5 ML IM: CPT | Performed by: PREVENTIVE MEDICINE

## 2019-10-27 ENCOUNTER — APPOINTMENT (OUTPATIENT)
Dept: RADIOLOGY | Facility: IMAGING CENTER | Age: 29
End: 2019-10-27
Attending: PHYSICIAN ASSISTANT
Payer: COMMERCIAL

## 2019-10-27 ENCOUNTER — OFFICE VISIT (OUTPATIENT)
Dept: URGENT CARE | Facility: CLINIC | Age: 29
End: 2019-10-27
Payer: COMMERCIAL

## 2019-10-27 VITALS
BODY MASS INDEX: 29.77 KG/M2 | RESPIRATION RATE: 16 BRPM | HEART RATE: 74 BPM | HEIGHT: 69 IN | DIASTOLIC BLOOD PRESSURE: 74 MMHG | TEMPERATURE: 98.2 F | WEIGHT: 201 LBS | OXYGEN SATURATION: 98 % | SYSTOLIC BLOOD PRESSURE: 118 MMHG

## 2019-10-27 DIAGNOSIS — S96.911A STRAIN OF RIGHT ANKLE, INITIAL ENCOUNTER: ICD-10-CM

## 2019-10-27 DIAGNOSIS — S90.31XA CONTUSION OF HEEL, RIGHT, INITIAL ENCOUNTER: ICD-10-CM

## 2019-10-27 DIAGNOSIS — S99.921A INJURY OF ANKLE AND FOOT, RIGHT, INITIAL ENCOUNTER: ICD-10-CM

## 2019-10-27 DIAGNOSIS — S99.911A INJURY OF ANKLE AND FOOT, RIGHT, INITIAL ENCOUNTER: ICD-10-CM

## 2019-10-27 PROCEDURE — 73610 X-RAY EXAM OF ANKLE: CPT | Mod: TC,RT | Performed by: PHYSICIAN ASSISTANT

## 2019-10-27 PROCEDURE — 73630 X-RAY EXAM OF FOOT: CPT | Mod: TC,RT | Performed by: PHYSICIAN ASSISTANT

## 2019-10-27 PROCEDURE — 99213 OFFICE O/P EST LOW 20 MIN: CPT | Performed by: PHYSICIAN ASSISTANT

## 2019-10-27 NOTE — LETTER
October 27, 2019         Patient: Eddie Daniel   YOB: 1990   Date of Visit: 10/27/2019           To Whom it May Concern:    Eddie Daniel was seen in my clinic on 10/27/2019. He may return to work on 10/30/2019.    If you have any questions or concerns, please don't hesitate to call.        Sincerely,           Pilar Delgado P.A.-C.  Electronically Signed

## 2019-10-28 ASSESSMENT — ENCOUNTER SYMPTOMS
SENSORY CHANGE: 0
SHORTNESS OF BREATH: 0
SORE THROAT: 0
NAUSEA: 0
PALPITATIONS: 0
CHILLS: 0
LOSS OF MOTION: 0
TINGLING: 0
BLURRED VISION: 0
NUMBNESS: 0
INABILITY TO BEAR WEIGHT: 0
MUSCLE WEAKNESS: 0
FEVER: 0
VOMITING: 0
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
## 2019-11-25 ENCOUNTER — OFFICE VISIT (OUTPATIENT)
Dept: MEDICAL GROUP | Facility: MEDICAL CENTER | Age: 29
End: 2019-11-25
Payer: COMMERCIAL

## 2019-11-25 VITALS
OXYGEN SATURATION: 96 % | BODY MASS INDEX: 29.29 KG/M2 | TEMPERATURE: 99.1 F | HEIGHT: 69 IN | SYSTOLIC BLOOD PRESSURE: 104 MMHG | WEIGHT: 197.75 LBS | DIASTOLIC BLOOD PRESSURE: 66 MMHG | HEART RATE: 66 BPM

## 2019-11-25 DIAGNOSIS — R19.7 DIARRHEA, UNSPECIFIED TYPE: ICD-10-CM

## 2019-11-25 DIAGNOSIS — Z87.19 HISTORY OF SMALL BOWEL OBSTRUCTION: ICD-10-CM

## 2019-11-25 DIAGNOSIS — E78.5 DYSLIPIDEMIA: ICD-10-CM

## 2019-11-25 DIAGNOSIS — M25.511 ACUTE PAIN OF RIGHT SHOULDER: ICD-10-CM

## 2019-11-25 DIAGNOSIS — J35.1 TONSILLAR HYPERTROPHY: ICD-10-CM

## 2019-11-25 DIAGNOSIS — Z85.038 HISTORY OF COLON CANCER: ICD-10-CM

## 2019-11-25 DIAGNOSIS — H93.8X1 EAR FULLNESS, RIGHT: ICD-10-CM

## 2019-11-25 PROBLEM — Z86.718 HISTORY OF DVT (DEEP VEIN THROMBOSIS): Status: ACTIVE | Noted: 2019-11-25

## 2019-11-25 PROCEDURE — 99214 OFFICE O/P EST MOD 30 MIN: CPT | Performed by: FAMILY MEDICINE

## 2019-11-25 NOTE — ASSESSMENT & PLAN NOTE
Chronic problem.  The patient states that he was told he should have his tonsils out in the past.  However, he never got this done.  He does have significant snoring.  He also sometimes stops breathing at night, per his wife.  They are interested in a referral to ENT today.
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
## 2019-11-25 NOTE — ASSESSMENT & PLAN NOTE
New problem. Has been having pain since a fall from [place]. He had the fall on 11/10/19. Has been having pain with   Can't sleep on his left side.   Pain with lift above his head.   Decreased ROM.   Feels similar to when his R shoulder was injured. That required rotator cuff surgery as well as labral repair.

## 2019-11-25 NOTE — ASSESSMENT & PLAN NOTE
Chronic problem. [facility] refused to see him due to an outstanding balance. He had colon cancer in the past and lost his job and insurance so he was unable to pay his bill.

## 2019-11-25 NOTE — ASSESSMENT & PLAN NOTE
New problem. Started 2 days ago. Feels clogged on the right. Hearing is different. Did have a sore throat last week which improved.

## 2019-12-12 ENCOUNTER — HOSPITAL ENCOUNTER (OUTPATIENT)
Dept: RADIOLOGY | Facility: MEDICAL CENTER | Age: 29
End: 2019-12-12
Attending: ORTHOPAEDIC SURGERY
Payer: COMMERCIAL

## 2019-12-12 DIAGNOSIS — S43.492A HUMERAL AVULSION GLENOHUMERAL LIGAMENT LESION, LEFT, INITIAL ENCOUNTER: ICD-10-CM

## 2019-12-12 PROCEDURE — A9576 INJ PROHANCE MULTIPACK: HCPCS | Performed by: ORTHOPAEDIC SURGERY

## 2019-12-12 PROCEDURE — 23350 INJECTION FOR SHOULDER X-RAY: CPT | Mod: LT

## 2019-12-12 PROCEDURE — 700117 HCHG RX CONTRAST REV CODE 255: Performed by: ORTHOPAEDIC SURGERY

## 2019-12-12 PROCEDURE — 73222 MRI JOINT UPR EXTREM W/DYE: CPT | Mod: LT

## 2019-12-12 RX ORDER — LIDOCAINE HYDROCHLORIDE 10 MG/ML
INJECTION, SOLUTION INFILTRATION; PERINEURAL
Status: DISCONTINUED
Start: 2019-12-12 | End: 2019-12-13 | Stop reason: HOSPADM

## 2019-12-12 RX ADMIN — GADOTERIDOL 0.1 ML: 279.3 INJECTION, SOLUTION INTRAVENOUS at 14:08

## 2019-12-12 RX ADMIN — IOHEXOL 5 ML: 300 INJECTION, SOLUTION INTRAVENOUS at 14:08

## 2019-12-13 NOTE — PROCEDURES
Immediate Post- Operative Note    PostOp Diagnosis: No full RCT    Procedure(s): Lt shoulder arthrogram with fluoroscopic guidance    Estimated Blood Loss: Less than 5 ml    Complications: None    12/12/2019     4:03 PM     Bunny Rubin

## 2020-01-17 ENCOUNTER — HOSPITAL ENCOUNTER (EMERGENCY)
Facility: MEDICAL CENTER | Age: 30
End: 2020-01-17
Attending: EMERGENCY MEDICINE
Payer: COMMERCIAL

## 2020-01-17 ENCOUNTER — APPOINTMENT (OUTPATIENT)
Dept: RADIOLOGY | Facility: MEDICAL CENTER | Age: 30
End: 2020-01-17
Attending: EMERGENCY MEDICINE
Payer: COMMERCIAL

## 2020-01-17 VITALS
HEART RATE: 73 BPM | DIASTOLIC BLOOD PRESSURE: 74 MMHG | OXYGEN SATURATION: 98 % | SYSTOLIC BLOOD PRESSURE: 119 MMHG | HEIGHT: 69 IN | TEMPERATURE: 97.2 F | WEIGHT: 196.21 LBS | BODY MASS INDEX: 29.06 KG/M2 | RESPIRATION RATE: 16 BRPM

## 2020-01-17 DIAGNOSIS — R31.9 HEMATURIA, UNSPECIFIED TYPE: ICD-10-CM

## 2020-01-17 DIAGNOSIS — R10.9 FLANK PAIN: ICD-10-CM

## 2020-01-17 LAB
ALBUMIN SERPL BCP-MCNC: 4.3 G/DL (ref 3.2–4.9)
ALBUMIN/GLOB SERPL: 1.7 G/DL
ALP SERPL-CCNC: 77 U/L (ref 30–99)
ALT SERPL-CCNC: 19 U/L (ref 2–50)
ANION GAP SERPL CALC-SCNC: 8 MMOL/L (ref 0–11.9)
APPEARANCE UR: CLEAR
AST SERPL-CCNC: 15 U/L (ref 12–45)
BACTERIA #/AREA URNS HPF: NEGATIVE /HPF
BASOPHILS # BLD AUTO: 0.6 % (ref 0–1.8)
BASOPHILS # BLD: 0.04 K/UL (ref 0–0.12)
BILIRUB SERPL-MCNC: 0.8 MG/DL (ref 0.1–1.5)
BILIRUB UR QL STRIP.AUTO: NEGATIVE
BUN SERPL-MCNC: 12 MG/DL (ref 8–22)
CALCIUM SERPL-MCNC: 9.2 MG/DL (ref 8.5–10.5)
CHLORIDE SERPL-SCNC: 104 MMOL/L (ref 96–112)
CO2 SERPL-SCNC: 24 MMOL/L (ref 20–33)
COLOR UR: YELLOW
CREAT SERPL-MCNC: 0.99 MG/DL (ref 0.5–1.4)
EOSINOPHIL # BLD AUTO: 0.09 K/UL (ref 0–0.51)
EOSINOPHIL NFR BLD: 1.4 % (ref 0–6.9)
EPI CELLS #/AREA URNS HPF: NEGATIVE /HPF
ERYTHROCYTE [DISTWIDTH] IN BLOOD BY AUTOMATED COUNT: 40.2 FL (ref 35.9–50)
GLOBULIN SER CALC-MCNC: 2.5 G/DL (ref 1.9–3.5)
GLUCOSE SERPL-MCNC: 109 MG/DL (ref 65–99)
GLUCOSE UR STRIP.AUTO-MCNC: NEGATIVE MG/DL
HCT VFR BLD AUTO: 43.6 % (ref 42–52)
HGB BLD-MCNC: 14.7 G/DL (ref 14–18)
HYALINE CASTS #/AREA URNS LPF: ABNORMAL /LPF
IMM GRANULOCYTES # BLD AUTO: 0.02 K/UL (ref 0–0.11)
IMM GRANULOCYTES NFR BLD AUTO: 0.3 % (ref 0–0.9)
KETONES UR STRIP.AUTO-MCNC: NEGATIVE MG/DL
LEUKOCYTE ESTERASE UR QL STRIP.AUTO: NEGATIVE
LIPASE SERPL-CCNC: 23 U/L (ref 11–82)
LYMPHOCYTES # BLD AUTO: 1.04 K/UL (ref 1–4.8)
LYMPHOCYTES NFR BLD: 15.8 % (ref 22–41)
MCH RBC QN AUTO: 29.2 PG (ref 27–33)
MCHC RBC AUTO-ENTMCNC: 33.7 G/DL (ref 33.7–35.3)
MCV RBC AUTO: 86.5 FL (ref 81.4–97.8)
MICRO URNS: ABNORMAL
MONOCYTES # BLD AUTO: 0.48 K/UL (ref 0–0.85)
MONOCYTES NFR BLD AUTO: 7.3 % (ref 0–13.4)
NEUTROPHILS # BLD AUTO: 4.91 K/UL (ref 1.82–7.42)
NEUTROPHILS NFR BLD: 74.6 % (ref 44–72)
NITRITE UR QL STRIP.AUTO: NEGATIVE
NRBC # BLD AUTO: 0 K/UL
NRBC BLD-RTO: 0 /100 WBC
PH UR STRIP.AUTO: 5.5 [PH] (ref 5–8)
PLATELET # BLD AUTO: 278 K/UL (ref 164–446)
PMV BLD AUTO: 9 FL (ref 9–12.9)
POTASSIUM SERPL-SCNC: 4 MMOL/L (ref 3.6–5.5)
PROT SERPL-MCNC: 6.8 G/DL (ref 6–8.2)
PROT UR QL STRIP: NEGATIVE MG/DL
RBC # BLD AUTO: 5.04 M/UL (ref 4.7–6.1)
RBC # URNS HPF: ABNORMAL /HPF
RBC UR QL AUTO: ABNORMAL
SODIUM SERPL-SCNC: 136 MMOL/L (ref 135–145)
SP GR UR STRIP.AUTO: 1.03
UROBILINOGEN UR STRIP.AUTO-MCNC: 0.2 MG/DL
WBC # BLD AUTO: 6.6 K/UL (ref 4.8–10.8)
WBC #/AREA URNS HPF: ABNORMAL /HPF

## 2020-01-17 PROCEDURE — A9270 NON-COVERED ITEM OR SERVICE: HCPCS | Performed by: EMERGENCY MEDICINE

## 2020-01-17 PROCEDURE — 96376 TX/PRO/DX INJ SAME DRUG ADON: CPT

## 2020-01-17 PROCEDURE — 85025 COMPLETE CBC W/AUTO DIFF WBC: CPT

## 2020-01-17 PROCEDURE — 700105 HCHG RX REV CODE 258: Performed by: EMERGENCY MEDICINE

## 2020-01-17 PROCEDURE — 96375 TX/PRO/DX INJ NEW DRUG ADDON: CPT

## 2020-01-17 PROCEDURE — 96374 THER/PROPH/DIAG INJ IV PUSH: CPT

## 2020-01-17 PROCEDURE — 80053 COMPREHEN METABOLIC PANEL: CPT

## 2020-01-17 PROCEDURE — 83690 ASSAY OF LIPASE: CPT

## 2020-01-17 PROCEDURE — 81001 URINALYSIS AUTO W/SCOPE: CPT

## 2020-01-17 PROCEDURE — 700111 HCHG RX REV CODE 636 W/ 250 OVERRIDE (IP): Performed by: EMERGENCY MEDICINE

## 2020-01-17 PROCEDURE — 700102 HCHG RX REV CODE 250 W/ 637 OVERRIDE(OP): Performed by: EMERGENCY MEDICINE

## 2020-01-17 PROCEDURE — 74176 CT ABD & PELVIS W/O CONTRAST: CPT

## 2020-01-17 PROCEDURE — 99284 EMERGENCY DEPT VISIT MOD MDM: CPT

## 2020-01-17 RX ORDER — ONDANSETRON 2 MG/ML
4 INJECTION INTRAMUSCULAR; INTRAVENOUS ONCE
Status: COMPLETED | OUTPATIENT
Start: 2020-01-17 | End: 2020-01-17

## 2020-01-17 RX ORDER — KETOROLAC TROMETHAMINE 30 MG/ML
15 INJECTION, SOLUTION INTRAMUSCULAR; INTRAVENOUS ONCE
Status: COMPLETED | OUTPATIENT
Start: 2020-01-17 | End: 2020-01-17

## 2020-01-17 RX ORDER — MORPHINE SULFATE 4 MG/ML
4 INJECTION, SOLUTION INTRAMUSCULAR; INTRAVENOUS
Status: DISCONTINUED | OUTPATIENT
Start: 2020-01-17 | End: 2020-01-17 | Stop reason: HOSPADM

## 2020-01-17 RX ORDER — ACETAMINOPHEN 325 MG/1
650 TABLET ORAL ONCE
Status: COMPLETED | OUTPATIENT
Start: 2020-01-17 | End: 2020-01-17

## 2020-01-17 RX ORDER — SODIUM CHLORIDE 9 MG/ML
1000 INJECTION, SOLUTION INTRAVENOUS ONCE
Status: COMPLETED | OUTPATIENT
Start: 2020-01-17 | End: 2020-01-17

## 2020-01-17 RX ADMIN — ACETAMINOPHEN 650 MG: 325 TABLET, FILM COATED ORAL at 08:33

## 2020-01-17 RX ADMIN — ONDANSETRON 4 MG: 2 INJECTION INTRAMUSCULAR; INTRAVENOUS at 10:36

## 2020-01-17 RX ADMIN — MORPHINE SULFATE 4 MG: 4 INJECTION INTRAVENOUS at 10:36

## 2020-01-17 RX ADMIN — KETOROLAC TROMETHAMINE 15 MG: 30 INJECTION, SOLUTION INTRAMUSCULAR at 07:09

## 2020-01-17 RX ADMIN — KETOROLAC TROMETHAMINE 15 MG: 30 INJECTION, SOLUTION INTRAMUSCULAR at 09:27

## 2020-01-17 RX ADMIN — SODIUM CHLORIDE 1000 ML: 9 INJECTION, SOLUTION INTRAVENOUS at 07:11
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
## 2020-01-17 NOTE — ED TRIAGE NOTES
.  Chief Complaint   Patient presents with   • Flank Pain     Right   • Nausea      Pt to triage via wheel chair with above complaint. Sudden onset  within the last hour, 8/10 pain accompanied with nausea.    Pt educated on triage process and returned to lobby. Will notify staff if condition worsens.

## 2020-01-17 NOTE — ED NOTES
Patient understands discharge instructions. Given all DC education,f/u appointments. Pt verbalized understanding.  In no distress. IV and telemetry dc'd. Has all belongings.  Ambulating well with steady gait. Will return for worsening symptoms.

## 2020-01-28 NOTE — ED NOTES
"/65 (BP Location: Left arm)   Pulse 122   Temp 99.9  F (37.7  C) (Oral)   Resp 16   Ht 1.702 m (5' 7\")   Wt 63.3 kg (139 lb 9.6 oz)   LMP 12/30/2019   SpO2 97%   BMI 21.86 kg/m    Patient states she is still have loose and liquid like stools. Complains of mid abdominal pain, denies an increase in pain with movement. Patient declines pain medication at this time. Patient states she feels good this afternoon. She is pleasant and cheerful. Patient says she does not have much of an appetite; did attempt to eat a couple peaches and took a bite of banana bread. Patient rested well this afternoon. She is alert and oriented and able to make needs known. She is independent with cares. Patient is [facility] to take her to [address] on [facility]. Will continue to monitor.   " Back from ct.

## 2020-02-18 ENCOUNTER — OFFICE VISIT (OUTPATIENT)
Dept: MEDICAL GROUP | Facility: MEDICAL CENTER | Age: 30
End: 2020-02-18
Payer: COMMERCIAL

## 2020-02-18 VITALS
BODY MASS INDEX: 28.83 KG/M2 | HEART RATE: 71 BPM | HEIGHT: 69 IN | SYSTOLIC BLOOD PRESSURE: 102 MMHG | DIASTOLIC BLOOD PRESSURE: 60 MMHG | WEIGHT: 194.67 LBS | TEMPERATURE: 99.1 F | OXYGEN SATURATION: 96 %

## 2020-02-18 DIAGNOSIS — M25.561 ACUTE PAIN OF RIGHT KNEE: ICD-10-CM

## 2020-02-18 DIAGNOSIS — Z91.09 ENVIRONMENTAL ALLERGIES: ICD-10-CM

## 2020-02-18 DIAGNOSIS — M25.562 ACUTE PAIN OF LEFT KNEE: ICD-10-CM

## 2020-02-18 PROCEDURE — 99214 OFFICE O/P EST MOD 30 MIN: CPT | Performed by: FAMILY MEDICINE

## 2020-02-18 RX ORDER — AZELASTINE 1 MG/ML
1 SPRAY, METERED NASAL 2 TIMES DAILY
Qty: 30 ML | Refills: 2 | Status: SHIPPED | OUTPATIENT
Start: 2020-02-18 | End: 2022-05-25

## 2020-02-18 RX ORDER — CETIRIZINE HYDROCHLORIDE 10 MG/1
10 CAPSULE, LIQUID FILLED ORAL DAILY
Qty: 30 CAP | Refills: 2 | Status: SHIPPED | OUTPATIENT
Start: 2020-02-18 | End: 2022-05-26

## 2020-02-19 NOTE — ASSESSMENT & PLAN NOTE
New problem. Started 2 weeks ago. Seems to be getting worse. Having burning sensation under the kne cap. Walking is the worst. Difficulty sleeping due to the pain. Using ice packs which are helpful. Ibuprofen and tylenonl thorughout the day with only mild improvement.   The patient has a h/o ACL, MCL, and PCL repair on that side.   He does have some hardware in that knee.   He has had cartigle transplant and multiple ACL repairs.   Last MRI done years ago.   Last surgery done with Dr. Isabel. Last seen by Chalo was about 1 month ago but that was for his shoulder.

## 2020-02-20 ENCOUNTER — HOSPITAL ENCOUNTER (OUTPATIENT)
Dept: RADIOLOGY | Facility: MEDICAL CENTER | Age: 30
End: 2020-02-20
Attending: FAMILY MEDICINE
Payer: COMMERCIAL

## 2020-02-20 DIAGNOSIS — M25.562 ACUTE PAIN OF LEFT KNEE: ICD-10-CM

## 2020-02-20 PROBLEM — M25.561 ACUTE PAIN OF RIGHT KNEE: Status: ACTIVE | Noted: 2020-02-18

## 2020-02-20 PROCEDURE — 73721 MRI JNT OF LWR EXTRE W/O DYE: CPT | Mod: RT

## 2020-02-20 NOTE — PROGRESS NOTES
Subjective:     CC: Diagnoses of Environmental allergies and Acute pain of left knee were pertinent to this visit.    HPI: Patient is a 29 y.o. male established patient who presents today with concern regarding new left knee pain      Acute pain of left knee  New problem. Started 2 weeks ago. Seems to be getting worse. Having burning sensation under the knee cap and on the lateral side.  Walking is the worst. Difficulty sleeping due to the pain. Using ice packs which are helpful. Ibuprofen and tylenonl thorughout the day with only mild improvement.   The patient has a h/o ACL, MCL, and PCL repair on that side.  He does have some hardware in that knee.   He has had cartigle transplant and multiple ACL repairs.   Last MRI done years ago.   Last surgery done with Dr. Isabel. Last seen by Chalo was about 1 month ago but that was for his shoulder.     Environmental allergies  Chronic problem.  Generally well controlled with zyrtec, astelin and singulair.  Requests refills today.      Past Medical History:   Diagnosis Date   • Cancer ([facility]) 2011    colon   • Colon cancer ([facility]) 2011   • Colon polyps    • Dental disorder     right upper broken tooth   • Heart burn    • Indigestion    • MRSA (methicillin resistant Staphylococcus aureus) 05/2011   • Other specified disorder of intestines     J Pouch   • Pain     right knee   • Snoring     sleep apnea questionairre completed       Social History     Tobacco Use   • Smoking status: Never Smoker   • Smokeless tobacco: Former User     Types: Chew   Substance Use Topics   • Alcohol use: Yes     Comment: occ    • Drug use: No       Current Outpatient Medications Ordered in Epic   Medication Sig Dispense Refill   • Cetirizine HCl (ZYRTEC ALLERGY) 10 MG Cap Take 10 mg by mouth every day. 30 Cap 2   • azelastine (ASTELIN) 137 MCG/SPRAY nasal spray [place] 1 Spray in nose 2 times a day. 30 mL 2   • montelukast (SINGULAIR) 10 MG Tab TAKE 1 TABLET BY MOUTH EVERY DAY 30 Tab 2     No current  "Epic-ordered facility-administered medications on file.        Allergies:  Tomato and Hydrocodone-acetaminophen    Health Maintenance: Completed    ROS:  Gen: no fevers/chill, no changes in weight  Eyes: no changes in vision  ENT: no sore throat, no hearing loss, no bloody nose  Pulm: no sob, no cough  CV: no chest pain, no palpitations  GI: no nausea/vomiting, no diarrhea  : no dysuria  MSk: no myalgias  Skin: no rash  Neuro: no headaches, no numbness/tingling  Heme/Lymph: no easy bruising      Objective:       Exam:  /60 (BP Location: Right arm, Patient Position: Sitting, BP Cuff Size: Adult)   Pulse 71   Temp 37.3 °C (99.1 °F) (Temporal)   Ht 1.753 m (5' 9\")   Wt 88.3 kg (194 lb 10.7 oz)   SpO2 96%   BMI 28.75 kg/m²  Body mass index is 28.75 kg/m².    General: Normal appearing. No distress.  HEAD: NCAT  EYES: conjunctiva clear, lids without ptosis, pupils equal and reactive to light  EARS: ears normal shape and contour.  MOUTH: normal dentition   Neck:  Normal ROM  Pulmonary: Normal effort. Normal respiratory rate.  Cardiovascular: Well perfused. No LE edema  Neurologic: Grossly normal, no focal deficits  Skin: Warm and dry.  No obvious lesions.  Musculoskeletal: Normal gait and station.  Brace in place on right knee, reports significant pain with range of motion.  Psych: Normal mood and affect. Alert and oriented x3. Judgment and insight is normal.      Assessment & Plan:     29 y.o. male with the following -     1. Environmental allergies  Chronic problem, refills placed for the following.  Generally well controlled on current regimen of cetirizine, Astelin and Singulair.  - Cetirizine HCl (ZYRTEC ALLERGY) 10 MG Cap; Take 10 mg by mouth every day.  Dispense: 30 Cap; Refill: 2  - azelastine (ASTELIN) 137 MCG/SPRAY nasal spray; [place] 1 Spray in nose 2 times a day.  Dispense: 30 mL; Refill: 2    2. Acute pain of left knee  New problem.  Started 2 weeks ago.  No known trauma.  However, the patient has " had multiple knee surgeries including multiple ACL repairs, MCL and PCL repair as well as cartilage transplant.  MRI ordered.  The patient is already established with Dr. Isabel, he will schedule his appointment once the MRI is back.  - MR-KNEE-W/O RIGHT; Future      No follow-ups on file.    Please note that this dictation was created using voice recognition software. I have made every reasonable attempt to correct obvious errors, but I expect that there are errors of grammar and possibly content that I did not discover before finalizing the note.

## 2020-03-09 ENCOUNTER — TELEPHONE (OUTPATIENT)
Dept: MEDICAL GROUP | Facility: MEDICAL CENTER | Age: 30
End: 2020-03-09

## 2020-03-09 DIAGNOSIS — H93.8X1 EAR FULLNESS, RIGHT: ICD-10-CM

## 2020-03-09 NOTE — TELEPHONE ENCOUNTER
Patient would like a new referral to go to [facility] as the previous ENT has no openings.

## 2020-03-09 NOTE — TELEPHONE ENCOUNTER
----- Message from Gia Haynes sent at 3/6/2020 11:58 AM PST -----  Regarding: Referral  Patient needs a new referral to [facility] please due to other location not having anything available     Thank you

## 2020-05-15 ENCOUNTER — OFFICE VISIT (OUTPATIENT)
Dept: MEDICAL GROUP | Facility: MEDICAL CENTER | Age: 30
End: 2020-05-15
Payer: COMMERCIAL

## 2020-05-15 VITALS
WEIGHT: 199 LBS | HEART RATE: 84 BPM | DIASTOLIC BLOOD PRESSURE: 64 MMHG | SYSTOLIC BLOOD PRESSURE: 104 MMHG | BODY MASS INDEX: 29.47 KG/M2 | TEMPERATURE: 98.4 F | HEIGHT: 69 IN | OXYGEN SATURATION: 95 %

## 2020-05-15 DIAGNOSIS — M25.571 ACUTE RIGHT ANKLE PAIN: ICD-10-CM

## 2020-05-15 PROCEDURE — 99214 OFFICE O/P EST MOD 30 MIN: CPT | Performed by: FAMILY MEDICINE

## 2020-05-15 ASSESSMENT — PATIENT HEALTH QUESTIONNAIRE - PHQ9: CLINICAL INTERPRETATION OF PHQ2 SCORE: 0

## 2020-05-15 ASSESSMENT — PAIN SCALES - GENERAL: PAINLEVEL: 7=MODERATE-SEVERE PAIN

## 2020-05-15 ASSESSMENT — FIBROSIS 4 INDEX: FIB4 SCORE: 0.38

## 2020-05-15 NOTE — ASSESSMENT & PLAN NOTE
Had severe rolled ankle back in 2008, had xray which was negative. The pain improved over time.   Now having pain in the same ankle on an off over the years but persistent since rolling the ankle again 4 days ago.   Also rolled the same ankle 2 weeks ago.   Now the akle is popping out while walking, causes him to fall to the ground when this occurs.   Pain on th side of the foot, laterally, radiating to toward the toes.

## 2020-05-18 ENCOUNTER — OFFICE VISIT (OUTPATIENT)
Dept: MEDICAL GROUP | Facility: CLINIC | Age: 30
End: 2020-05-18
Payer: COMMERCIAL

## 2020-05-18 VITALS
DIASTOLIC BLOOD PRESSURE: 76 MMHG | WEIGHT: 199 LBS | SYSTOLIC BLOOD PRESSURE: 118 MMHG | RESPIRATION RATE: 18 BRPM | HEIGHT: 69 IN | TEMPERATURE: 99 F | BODY MASS INDEX: 29.47 KG/M2 | OXYGEN SATURATION: 97 % | HEART RATE: 72 BPM

## 2020-05-18 DIAGNOSIS — M25.371 ANKLE INSTABILITY, RIGHT: ICD-10-CM

## 2020-05-18 DIAGNOSIS — M25.571 CHRONIC PAIN OF RIGHT ANKLE: ICD-10-CM

## 2020-05-18 DIAGNOSIS — G89.29 CHRONIC PAIN OF RIGHT ANKLE: ICD-10-CM

## 2020-05-18 PROCEDURE — 99203 OFFICE O/P NEW LOW 30 MIN: CPT | Performed by: FAMILY MEDICINE

## 2020-05-18 ASSESSMENT — FIBROSIS 4 INDEX: FIB4 SCORE: 0.38

## 2020-05-18 ASSESSMENT — ENCOUNTER SYMPTOMS
CHILLS: 0
SHORTNESS OF BREATH: 0
DIZZINESS: 0
NAUSEA: 0
FEVER: 0
VOMITING: 0
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
## 2020-05-18 NOTE — PROGRESS NOTES
Subjective:     CC: The encounter diagnosis was Acute right ankle pain.    HPI: Patient is a 29 y.o. male established patient who presents today concerned regarding right ankle pain.      Acute right ankle pain  Problem.  Had severe rolled ankle back in 2008, had xray which was negative at that time. The pain improved over time.   Now having pain in the same ankle on an off over the years but persistent since rolling the ankle again 4 days ago.   Also rolled the same ankle 2 weeks ago.   Now the ankle is popping out while walking, causes him to fall to the ground when this occurs.   Pain on th side of the foot, laterally, radiating to toward the toes.       Past Medical History:   Diagnosis Date   • Cancer ([facility]) 2011    colon   • Colon cancer ([facility]) 2011   • Colon polyps    • Dental disorder     right upper broken tooth   • Heart burn    • Indigestion    • MRSA (methicillin resistant Staphylococcus aureus) 05/2011   • Other specified disorder of intestines     J Pouch   • Pain     right knee   • Snoring     sleep apnea questionairre completed       Social History     Tobacco Use   • Smoking status: Never Smoker   • Smokeless tobacco: Former User     Types: Chew   Substance Use Topics   • Alcohol use: Yes     Comment: occ    • Drug use: No       Current Outpatient Medications Ordered in Epic   Medication Sig Dispense Refill   • Cetirizine HCl (ZYRTEC ALLERGY) 10 MG Cap Take 10 mg by mouth every day. 30 Cap 2   • azelastine (ASTELIN) 137 MCG/SPRAY nasal spray Huggins 1 Spray in nose 2 times a day. 30 mL 2   • montelukast (SINGULAIR) 10 MG Tab TAKE 1 TABLET BY MOUTH EVERY DAY 30 Tab 2     No current Epic-ordered facility-administered medications on file.        Allergies:  Tomato and Hydrocodone-acetaminophen    Health Maintenance: Completed    ROS:  Pulm: no sob, no cough  CV: no chest pain, no palpitations      Objective:       Exam:  /64 (BP Location: Right arm, Patient Position: Sitting, BP Cuff Size: Adult  "long)   Pulse 84   Temp 36.9 °C (98.4 °F) (Temporal)   Ht 1.753 m (5' 9\")   Wt 90.3 kg (199 lb)   SpO2 95%   BMI 29.39 kg/m²  Body mass index is 29.39 kg/m².    General: Normal appearing. No distress.  HEAD: NCAT  EYES: conjunctiva clear, lids without ptosis, pupils equal and reactive to light  EARS: ears normal shape and contour.  MOUTH: normal dentition   Neck:  Normal ROM  Pulmonary: Normal effort. Normal respiratory rate.  Cardiovascular: Well perfused. No LE edema  Neurologic: Grossly normal, no focal deficits  Skin: Warm and dry.  No obvious lesions.   MSK: Patient is able to bear weight on right foot. Slightly antalgic gait.  Ankles: No noticeable deformity, swelling or bruising. ROM intact. Tenderness to palpation on lateral malleolus. No tenderness along fibula. Squeeze test negative. External rotation test positive. Anterior drawer negative occult to assess due to guarding. Talar tilt negative. 5/5 strength bilaterally. Sensation intact.   Psych: Normal mood and affect. Alert and oriented x3. Judgment and insight is normal.   EXAM        Assessment & Plan:     29 y.o. male with the following -     1. Acute right ankle pain  New problem.  Ankle exam reveals tenderness to palpation on the lateral malleolus.  Difficult to assess ligaments as patient was guarding during exam.  Order placed for an ankle x-ray.  Referral placed to sports medicine.  - DX-ANKLE 3+ VIEWS RIGHT; Future  - REFERRAL TO SPORTS MEDICINE      Return if symptoms worsen or fail to improve.    Please note that this dictation was created using voice recognition software. I have made every reasonable attempt to correct obvious errors, but I expect that there are errors of grammar and possibly content that I did not discover before finalizing the note.      "

## 2020-05-19 ENCOUNTER — HOSPITAL ENCOUNTER (OUTPATIENT)
Dept: RADIOLOGY | Facility: MEDICAL CENTER | Age: 30
End: 2020-05-19
Attending: FAMILY MEDICINE
Payer: COMMERCIAL

## 2020-05-19 DIAGNOSIS — M25.371 ANKLE INSTABILITY, RIGHT: ICD-10-CM

## 2020-05-19 DIAGNOSIS — M25.571 CHRONIC PAIN OF RIGHT ANKLE: ICD-10-CM

## 2020-05-19 DIAGNOSIS — G89.29 CHRONIC PAIN OF RIGHT ANKLE: ICD-10-CM

## 2020-05-19 DIAGNOSIS — M25.571 ACUTE RIGHT ANKLE PAIN: ICD-10-CM

## 2020-05-19 PROCEDURE — 73721 MRI JNT OF LWR EXTRE W/O DYE: CPT | Mod: RT

## 2020-05-19 PROCEDURE — 73610 X-RAY EXAM OF ANKLE: CPT | Mod: RT

## 2020-07-30 ENCOUNTER — TELEPHONE (OUTPATIENT)
Dept: MEDICAL GROUP | Facility: MEDICAL CENTER | Age: 30
End: 2020-07-30

## 2020-07-30 NOTE — TELEPHONE ENCOUNTER
1. Caller Name: Eddie Daniel   Call Back Number: [phone] (home)   How would the patient prefer to be contacted with a response: Phone call OK to leave a detailed message    Patient has upcoming surgery with Dr. Myles, and they are requesting he be prescribed a medication due to patient's history of blood clots by PCP. Patient's pre-op notes are in chart media. Please Advise, thank you.

## 2020-08-03 ENCOUNTER — OFFICE VISIT (OUTPATIENT)
Dept: ADMISSIONS | Facility: MEDICAL CENTER | Age: 30
End: 2020-08-03
Attending: ORTHOPAEDIC SURGERY
Payer: COMMERCIAL

## 2020-08-03 DIAGNOSIS — Z01.812 PRE-OPERATIVE LABORATORY EXAMINATION: ICD-10-CM

## 2020-08-03 DIAGNOSIS — Z86.718 HISTORY OF DVT (DEEP VEIN THROMBOSIS): ICD-10-CM

## 2020-08-03 LAB
COVID ORDER STATUS COVID19: NORMAL
ERYTHROCYTE [DISTWIDTH] IN BLOOD BY AUTOMATED COUNT: 39.9 FL (ref 35.9–50)
HCT VFR BLD AUTO: 45.3 % (ref 42–52)
HGB BLD-MCNC: 15.3 G/DL (ref 14–18)
MCH RBC QN AUTO: 29.1 PG (ref 27–33)
MCHC RBC AUTO-ENTMCNC: 33.8 G/DL (ref 33.7–35.3)
MCV RBC AUTO: 86.3 FL (ref 81.4–97.8)
PLATELET # BLD AUTO: 251 K/UL (ref 164–446)
PMV BLD AUTO: 9.3 FL (ref 9–12.9)
RBC # BLD AUTO: 5.25 M/UL (ref 4.7–6.1)
SARS-COV-2 RNA RESP QL NAA+PROBE: NOTDETECTED
SPECIMEN SOURCE: NORMAL
WBC # BLD AUTO: 5.7 K/UL (ref 4.8–10.8)

## 2020-08-03 PROCEDURE — U0003 INFECTIOUS AGENT DETECTION BY NUCLEIC ACID (DNA OR RNA); SEVERE ACUTE RESPIRATORY SYNDROME CORONAVIRUS 2 (SARS-COV-2) (CORONAVIRUS DISEASE [COVID-19]), AMPLIFIED PROBE TECHNIQUE, MAKING USE OF HIGH THROUGHPUT TECHNOLOGIES AS DESCRIBED BY CMS-2020-01-R: HCPCS

## 2020-08-03 PROCEDURE — 85027 COMPLETE CBC AUTOMATED: CPT

## 2020-08-03 PROCEDURE — 36415 COLL VENOUS BLD VENIPUNCTURE: CPT

## 2020-08-03 RX ORDER — ACETAMINOPHEN 500 MG
1000 TABLET ORAL EVERY 6 HOURS PRN
COMMUNITY
End: 2022-05-26

## 2020-08-03 RX ORDER — TRAMADOL HYDROCHLORIDE 50 MG/1
50 TABLET ORAL EVERY 6 HOURS PRN
COMMUNITY
End: 2022-05-26

## 2020-08-03 SDOH — HEALTH STABILITY: MENTAL HEALTH: HOW MANY STANDARD DRINKS CONTAINING ALCOHOL DO YOU HAVE ON A TYPICAL DAY?: 1 OR 2

## 2020-08-03 SDOH — HEALTH STABILITY: MENTAL HEALTH: HOW OFTEN DO YOU HAVE A DRINK CONTAINING ALCOHOL?: 4 OR MORE TIMES A WEEK

## 2020-08-03 SDOH — HEALTH STABILITY: MENTAL HEALTH: HOW OFTEN DO YOU HAVE 6 OR MORE DRINKS ON ONE OCCASION?: MONTHLY

## 2020-08-03 ASSESSMENT — FIBROSIS 4 INDEX: FIB4 SCORE: 0.4

## 2020-08-03 NOTE — OR NURSING
"Pre-admit appointment completed. \"Preparing for your procedure\" sheet given to pt along with verbal and written instructions. Pt instructed to continue regularly prescribed medications through the day before surgery. Pt instructed to take the following medications the day of surgery with a sip of water, per anesthesia protocol; if needed-tylenol and tramadol.     Pt states he will be taking an oral blood thinner after surgery due to hx of DVT. Pt to follow up with Dr Chavez and his PCP to verify it gets ordered, because pt states not showing at pharmacy yet.     Pt denies any problems with anesthesia.    Pt hadCOVID test today.          Pt given instructions to self isolate after COVID test and to contact doctor if any symptoms of COVID occur.  "

## 2020-08-04 ENCOUNTER — TELEPHONE (OUTPATIENT)
Dept: VASCULAR LAB | Facility: MEDICAL CENTER | Age: 30
End: 2020-08-04

## 2020-08-04 DIAGNOSIS — Z86.718 HISTORY OF DVT (DEEP VEIN THROMBOSIS): ICD-10-CM

## 2020-08-04 NOTE — TELEPHONE ENCOUNTER
This visit was conducted telephonically.    NEW DOAC     PCP: Jessica Gutierrez M.D.  Dx: Hx of DVT provoked by surgery in 2014    Pt Hx: Pt was referred to our anticoagulation clinic by orthopedic surgeron Dr Myles to initiate prophylactic OAC for an upcoming tendon repair on 8/6/2020. Pt has extensive hx of ortho procedures since 2007. In 2014, he developed a DVT post-op and was placed on Lovenox x 7 days.     Labs:  Lab Results   Component Value Date/Time    WBC 5.7 08/03/2020 01:43 PM    RBC 5.25 08/03/2020 01:43 PM    HEMOGLOBIN 15.3 08/03/2020 01:43 PM    HEMATOCRIT 45.3 08/03/2020 01:43 PM    MCV 86.3 08/03/2020 01:43 PM    MCH 29.1 08/03/2020 01:43 PM    MCHC 33.8 08/03/2020 01:43 PM    MPV 9.3 08/03/2020 01:43 PM    NEUTSPOLYS 74.60 (H) 01/17/2020 06:54 AM    LYMPHOCYTES 15.80 (L) 01/17/2020 06:54 AM    MONOCYTES 7.30 01/17/2020 06:54 AM    EOSINOPHILS 1.40 01/17/2020 06:54 AM    BASOPHILS 0.60 01/17/2020 06:54 AM    HYPOCHROMIA 1+ 05/13/2014 09:04 AM    ANISOCYTOSIS 1+ 06/08/2011 02:15 PM      Lab Results   Component Value Date/Time    SODIUM 136 01/17/2020 06:54 AM    POTASSIUM 4.0 01/17/2020 06:54 AM    CHLORIDE 104 01/17/2020 06:54 AM    CO2 24 01/17/2020 06:54 AM    GLUCOSE 109 (H) 01/17/2020 06:54 AM    BUN 12 01/17/2020 06:54 AM    CREATININE 0.99 01/17/2020 06:54 AM    CREATININE 1.0 01/11/2007 12:00 PM      Pt is new to Xarelto and new to [facility]. Discussed:   · Indication for DOAC therapy.  · Importance of monitoring and compliance.   · Monitoring parameters, signs and symptoms of bleeding or clotting.  · DOAC therapy, side effects, potential DDIs, OTC medications  · Lifestyle safety, ie smoking, ETOH, hobby safety, fall safety/prevention  · Procedures for missed doses or suspected missed doses, surgeries/procedures, travel, dental work, any medication changes    Start Xarelto 10mg taken daily for 30 days post-op.   Pt's first dose will be 8/7/2020 (1 day post-op) per Dr. Chavez.    DOAC affordable =  provided pharmacy with 30 day trial coupon card    F/U - PRN    Pili Victor, Eligio     CC Jessica Gutierrez M.D., Dr. Hebert Chavez, Dr. Michael Bloch    Addendum 8/4 @ 2449: Coupon does not cover Xarelto 10 mg tabs. Pt does not have Worker's Comp, nor does he have prescription coverage. Will send RX for samples to [facility]. Pt verbalized understanding. Pili Victor, JerichoD

## 2020-08-04 NOTE — PROGRESS NOTES
Per discussion with PCP, we will attempt to set up for asap appt for in anticoag clinic to arrange for potential prophylactic anticoagulation for upcoming surgery 8-6.    Michael Bloch, MD  Anticoagulation Clinic

## 2020-08-05 PROCEDURE — RXMED WILLOW AMBULATORY MEDICATION CHARGE: Performed by: INTERNAL MEDICINE

## 2020-08-06 ENCOUNTER — HOSPITAL ENCOUNTER (OUTPATIENT)
Facility: MEDICAL CENTER | Age: 30
End: 2020-08-06
Attending: ORTHOPAEDIC SURGERY | Admitting: ORTHOPAEDIC SURGERY
Payer: COMMERCIAL

## 2020-08-06 ENCOUNTER — ANESTHESIA (OUTPATIENT)
Dept: SURGERY | Facility: MEDICAL CENTER | Age: 30
End: 2020-08-06
Payer: COMMERCIAL

## 2020-08-06 ENCOUNTER — ANESTHESIA EVENT (OUTPATIENT)
Dept: SURGERY | Facility: MEDICAL CENTER | Age: 30
End: 2020-08-06
Payer: COMMERCIAL

## 2020-08-06 VITALS
DIASTOLIC BLOOD PRESSURE: 71 MMHG | SYSTOLIC BLOOD PRESSURE: 119 MMHG | OXYGEN SATURATION: 95 % | HEIGHT: 67 IN | BODY MASS INDEX: 30.14 KG/M2 | WEIGHT: 192.02 LBS | TEMPERATURE: 98.1 F | HEART RATE: 75 BPM | RESPIRATION RATE: 16 BRPM

## 2020-08-06 PROCEDURE — 700111 HCHG RX REV CODE 636 W/ 250 OVERRIDE (IP): Performed by: ANESTHESIOLOGY

## 2020-08-06 PROCEDURE — 160039 HCHG SURGERY MINUTES - EA ADDL 1 MIN LEVEL 3: Performed by: ORTHOPAEDIC SURGERY

## 2020-08-06 PROCEDURE — 501838 HCHG SUTURE GENERAL: Performed by: ORTHOPAEDIC SURGERY

## 2020-08-06 PROCEDURE — 160009 HCHG ANES TIME/MIN: Performed by: ORTHOPAEDIC SURGERY

## 2020-08-06 PROCEDURE — 160028 HCHG SURGERY MINUTES - 1ST 30 MINS LEVEL 3: Performed by: ORTHOPAEDIC SURGERY

## 2020-08-06 PROCEDURE — 501445 HCHG STAPLER, SKIN DISP: Performed by: ORTHOPAEDIC SURGERY

## 2020-08-06 PROCEDURE — 160046 HCHG PACU - 1ST 60 MINS PHASE II: Performed by: ORTHOPAEDIC SURGERY

## 2020-08-06 PROCEDURE — 160035 HCHG PACU - 1ST 60 MINS PHASE I: Performed by: ORTHOPAEDIC SURGERY

## 2020-08-06 PROCEDURE — 160048 HCHG OR STATISTICAL LEVEL 1-5: Performed by: ORTHOPAEDIC SURGERY

## 2020-08-06 PROCEDURE — 500881 HCHG PACK, EXTREMITY: Performed by: ORTHOPAEDIC SURGERY

## 2020-08-06 PROCEDURE — A9270 NON-COVERED ITEM OR SERVICE: HCPCS

## 2020-08-06 PROCEDURE — 502240 HCHG MISC OR SUPPLY RC 0272: Performed by: ORTHOPAEDIC SURGERY

## 2020-08-06 PROCEDURE — 64445 NJX AA&/STRD SCIATIC NRV IMG: CPT | Performed by: ORTHOPAEDIC SURGERY

## 2020-08-06 PROCEDURE — 700105 HCHG RX REV CODE 258: Performed by: ORTHOPAEDIC SURGERY

## 2020-08-06 PROCEDURE — 700102 HCHG RX REV CODE 250 W/ 637 OVERRIDE(OP)

## 2020-08-06 PROCEDURE — 500112 HCHG BONE WAX: Performed by: ORTHOPAEDIC SURGERY

## 2020-08-06 PROCEDURE — 160002 HCHG RECOVERY MINUTES (STAT): Performed by: ORTHOPAEDIC SURGERY

## 2020-08-06 PROCEDURE — 700101 HCHG RX REV CODE 250: Performed by: ANESTHESIOLOGY

## 2020-08-06 PROCEDURE — 160025 RECOVERY II MINUTES (STATS): Performed by: ORTHOPAEDIC SURGERY

## 2020-08-06 RX ORDER — LIDOCAINE HYDROCHLORIDE 20 MG/ML
INJECTION, SOLUTION EPIDURAL; INFILTRATION; INTRACAUDAL; PERINEURAL PRN
Status: DISCONTINUED | OUTPATIENT
Start: 2020-08-06 | End: 2020-08-06 | Stop reason: SURG

## 2020-08-06 RX ORDER — ROPIVACAINE HYDROCHLORIDE 5 MG/ML
INJECTION, SOLUTION EPIDURAL; INFILTRATION; PERINEURAL
Status: COMPLETED | OUTPATIENT
Start: 2020-08-06 | End: 2020-08-06

## 2020-08-06 RX ORDER — KETOROLAC TROMETHAMINE 30 MG/ML
INJECTION, SOLUTION INTRAMUSCULAR; INTRAVENOUS PRN
Status: DISCONTINUED | OUTPATIENT
Start: 2020-08-06 | End: 2020-08-06 | Stop reason: SURG

## 2020-08-06 RX ORDER — ROCURONIUM BROMIDE 10 MG/ML
INJECTION, SOLUTION INTRAVENOUS PRN
Status: DISCONTINUED | OUTPATIENT
Start: 2020-08-06 | End: 2020-08-06 | Stop reason: SURG

## 2020-08-06 RX ORDER — HALOPERIDOL 5 MG/ML
1 INJECTION INTRAMUSCULAR
Status: DISCONTINUED | OUTPATIENT
Start: 2020-08-06 | End: 2020-08-06 | Stop reason: HOSPADM

## 2020-08-06 RX ORDER — OXYCODONE HCL 5 MG/5 ML
5 SOLUTION, ORAL ORAL
Status: DISCONTINUED | OUTPATIENT
Start: 2020-08-06 | End: 2020-08-06 | Stop reason: HOSPADM

## 2020-08-06 RX ORDER — SODIUM CHLORIDE, SODIUM LACTATE, POTASSIUM CHLORIDE, CALCIUM CHLORIDE 600; 310; 30; 20 MG/100ML; MG/100ML; MG/100ML; MG/100ML
INJECTION, SOLUTION INTRAVENOUS CONTINUOUS
Status: DISCONTINUED | OUTPATIENT
Start: 2020-08-06 | End: 2020-08-06 | Stop reason: HOSPADM

## 2020-08-06 RX ORDER — ONDANSETRON 2 MG/ML
4 INJECTION INTRAMUSCULAR; INTRAVENOUS
Status: DISCONTINUED | OUTPATIENT
Start: 2020-08-06 | End: 2020-08-06 | Stop reason: HOSPADM

## 2020-08-06 RX ORDER — ONDANSETRON 2 MG/ML
INJECTION INTRAMUSCULAR; INTRAVENOUS PRN
Status: DISCONTINUED | OUTPATIENT
Start: 2020-08-06 | End: 2020-08-06 | Stop reason: SURG

## 2020-08-06 RX ORDER — DEXAMETHASONE SODIUM PHOSPHATE 4 MG/ML
INJECTION, SOLUTION INTRA-ARTICULAR; INTRALESIONAL; INTRAMUSCULAR; INTRAVENOUS; SOFT TISSUE
Status: COMPLETED | OUTPATIENT
Start: 2020-08-06 | End: 2020-08-06

## 2020-08-06 RX ORDER — MEPERIDINE HYDROCHLORIDE 25 MG/ML
6.25 INJECTION INTRAMUSCULAR; INTRAVENOUS; SUBCUTANEOUS
Status: DISCONTINUED | OUTPATIENT
Start: 2020-08-06 | End: 2020-08-06 | Stop reason: HOSPADM

## 2020-08-06 RX ORDER — DIPHENHYDRAMINE HYDROCHLORIDE 50 MG/ML
12.5 INJECTION INTRAMUSCULAR; INTRAVENOUS
Status: DISCONTINUED | OUTPATIENT
Start: 2020-08-06 | End: 2020-08-06 | Stop reason: HOSPADM

## 2020-08-06 RX ORDER — DEXAMETHASONE SODIUM PHOSPHATE 4 MG/ML
INJECTION, SOLUTION INTRA-ARTICULAR; INTRALESIONAL; INTRAMUSCULAR; INTRAVENOUS; SOFT TISSUE PRN
Status: DISCONTINUED | OUTPATIENT
Start: 2020-08-06 | End: 2020-08-06 | Stop reason: SURG

## 2020-08-06 RX ORDER — OXYCODONE HCL 5 MG/5 ML
10 SOLUTION, ORAL ORAL
Status: DISCONTINUED | OUTPATIENT
Start: 2020-08-06 | End: 2020-08-06 | Stop reason: HOSPADM

## 2020-08-06 RX ORDER — CEFAZOLIN SODIUM 1 G/3ML
INJECTION, POWDER, FOR SOLUTION INTRAMUSCULAR; INTRAVENOUS PRN
Status: DISCONTINUED | OUTPATIENT
Start: 2020-08-06 | End: 2020-08-06 | Stop reason: SURG

## 2020-08-06 RX ADMIN — ROCURONIUM BROMIDE 50 MG: 10 INJECTION, SOLUTION INTRAVENOUS at 07:03

## 2020-08-06 RX ADMIN — DEXAMETHASONE SODIUM PHOSPHATE 4 MG: 4 INJECTION, SOLUTION INTRAMUSCULAR; INTRAVENOUS at 06:52

## 2020-08-06 RX ADMIN — LIDOCAINE HYDROCHLORIDE 60 MG: 20 INJECTION, SOLUTION EPIDURAL; INFILTRATION; INTRACAUDAL; PERINEURAL at 07:03

## 2020-08-06 RX ADMIN — PROPOFOL 220 MG: 10 INJECTION, EMULSION INTRAVENOUS at 07:03

## 2020-08-06 RX ADMIN — FENTANYL CITRATE 50 MCG: 50 INJECTION, SOLUTION INTRAMUSCULAR; INTRAVENOUS at 07:03

## 2020-08-06 RX ADMIN — SODIUM CHLORIDE, POTASSIUM CHLORIDE, SODIUM LACTATE AND CALCIUM CHLORIDE: 600; 310; 30; 20 INJECTION, SOLUTION INTRAVENOUS at 06:15

## 2020-08-06 RX ADMIN — DEXAMETHASONE SODIUM PHOSPHATE 4 MG: 4 INJECTION, SOLUTION INTRAMUSCULAR; INTRAVENOUS at 07:30

## 2020-08-06 RX ADMIN — ONDANSETRON 4 MG: 2 INJECTION INTRAMUSCULAR; INTRAVENOUS at 07:50

## 2020-08-06 RX ADMIN — SUGAMMADEX 200 MG: 100 INJECTION, SOLUTION INTRAVENOUS at 08:09

## 2020-08-06 RX ADMIN — CEFAZOLIN 2 G: 1 INJECTION, POWDER, FOR SOLUTION INTRAVENOUS at 07:10

## 2020-08-06 RX ADMIN — KETOROLAC TROMETHAMINE 30 MG: 30 INJECTION, SOLUTION INTRAMUSCULAR at 07:50

## 2020-08-06 RX ADMIN — FENTANYL CITRATE 50 MCG: 50 INJECTION, SOLUTION INTRAMUSCULAR; INTRAVENOUS at 07:48

## 2020-08-06 RX ADMIN — ROPIVACAINE HYDROCHLORIDE 25 ML: 5 INJECTION, SOLUTION EPIDURAL; INFILTRATION; PERINEURAL at 06:52

## 2020-08-06 RX ADMIN — POVIDONE-IODINE 15 ML: 10 SOLUTION TOPICAL at 06:10

## 2020-08-06 ASSESSMENT — FIBROSIS 4 INDEX: FIB4 SCORE: 0.44

## 2020-08-06 NOTE — ANESTHESIA PROCEDURE NOTES
Airway    Date/Time: 8/6/2020 7:04 AM  Performed by: Dimitri Harrison M.D.  Authorized by: Dimitri Harrison M.D.     Location:  OR  Urgency:  Elective  Indications for Airway Management:  Anesthesia      Spontaneous Ventilation: absent    Sedation Level:  Deep  Preoxygenated: Yes    Patient Position:  Sniffing  Mask Difficulty Assessment:  0 - not attempted  Final Airway Type:  Endotracheal airway  Final Endotracheal Airway:  ETT  Cuffed: Yes    Technique Used for Successful ETT Placement:  Direct laryngoscopy    Insertion Site:  Oral  Blade Type:  Nick  Laryngoscope Blade/Videolaryngoscope Blade Size:  3  ETT Size (mm):  7.5  Measured from:  Lips  ETT to Lips (cm):  23  Placement Verified by: auscultation and capnometry    Cormack-Lehane Classification:  Grade I - full view of glottis  Number of Attempts at Approach:  1  Number of Other Approaches Attempted:  0

## 2020-08-06 NOTE — OR NURSING
0549: Brought patient back to pre-op and assumed care.  0621: Patient allergies and NPO status verified, home medication reconciliation completed and belongings secured. Patient verbalizes understanding of pain scale, expected course of stay and plan of care. Surgical site verified with patient. IV access established. Sequentials placed on legs.

## 2020-08-06 NOTE — OR NURSING
0814: To PACU from OR via tristin, Dressing on right lower leg and ankle is clean, dry, and intact. Patient is denying pain or nausea at this time. Patient is receiving supplemental oxygen at 6 lpm. Patient has no grimace and breathing is non-labored.    0825: Patient resting, denies pain or nausea.    0840: No change in surgical site assessment. Meets criteria to transfer to Stage 2    0844: PACU to Phase II, no pain or nausea.    0905: D/Josemanuel to care of family post uneventful stay in PACU 2.

## 2020-08-06 NOTE — ANESTHESIA TIME REPORT
Anesthesia Start and Stop Event Times     Date Time Event    8/6/2020 0655 Ready for Procedure     0659 Anesthesia Start     0817 Anesthesia Stop        Responsible Staff  08/06/20    Name Role Begin End    Dimitri Harrison M.D. Anesth 0659 0817        Preop Diagnosis (Free Text):  Pre-op Diagnosis     ACHILLES TENDINITIS        Preop Diagnosis (Codes):    Post op Diagnosis  Achilles tendonitis      Premium Reason  A. 3PM - 7AM    Comments:

## 2020-08-06 NOTE — ANESTHESIA PREPROCEDURE EVALUATION
Achilles tendonitis. No problems with anesthesia in the past.     Relevant Problems   NEURO   (+) History of DVT (deep vein thrombosis)   (+) History of abdominal pain   (+) History of colon cancer   (+) History of small bowel obstruction      GI   (+) GERD (gastroesophageal reflux disease)      Other   (+) Tonsillar hypertrophy       Physical Exam    Airway   Mallampati: II  TM distance: >3 FB  Neck ROM: full       Cardiovascular - normal exam  Rhythm: regular  Rate: normal  (-) murmur     Dental - normal exam           Pulmonary - normal exam  Breath sounds clear to auscultation     Abdominal    Neurological - normal exam                 Anesthesia Plan    ASA 2       Plan - general and peripheral nerve block     Peripheral nerve block will be post-op pain control  Airway plan will be ETT        Induction: intravenous    Postoperative Plan: Postoperative administration of opioids is intended.    Pertinent diagnostic labs and testing reviewed    Informed Consent:    Anesthetic plan and risks discussed with patient.    Use of blood products discussed with: patient whom consented to blood products.

## 2020-08-06 NOTE — ANESTHESIA PROCEDURE NOTES
Peripheral Block    Date/Time: 8/6/2020 6:52 AM  Performed by: Dimitri Harrison M.D.  Authorized by: Dimitri Harrison M.D.     Patient Location:  Pre-op  Start Time:  8/6/2020 6:52 AM  End Time:  8/6/2020 6:55 AM  Reason for Block: at surgeon's request and post-op pain management    patient identified, IV checked, site marked, risks and benefits discussed, surgical consent, monitors and equipment checked, pre-op evaluation and timeout performed    Patient Position:  Supine  Prep: ChloraPrep    Monitoring:  Heart rate, continuous pulse ox and cardiac monitor  Block Region:  Lower Extremity  Lower Extremity - Block Type:  SCIATIC nerve block, lateral approach    Laterality:  Right  Procedures: ultrasound guided  Image captured, interpreted and electronically stored.  Local Infiltration:  Lidocaine  Strength:  1 %  Dose:  3 ml  Block Type:  Single-shot  Needle Length:  100mm  Needle Gauge:  21 G  Needle Localization:  Ultrasound guidance  Injection Assessment:  Negative aspiration for heme, no paresthesia on injection, incremental injection and local visualized surrounding nerve on ultrasound  Evidence of intravascular injection: No     US Guided Sciatic Nerve Block   US probe placed several cm proximal to popliteal crease on posterior thigh and scanned caudad and cephalad until Sciatic Nerve (SN) identified superficial/lateral to popliteal artery.  Needle inserted lateral to probe in an in plane approach under direct visualization to a perineural position.  After negative aspiration LA injected with ease and visualized surrounding the SN.

## 2020-08-06 NOTE — OP REPORT
DATE OF OPERATION: 8/6/2020     SURGEON: Wm Hebert Chavez M.D.    ANESTHESIOLOGIST: Anesthesiologist: Dimitri Harrison M.D.    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  Entrapment of right FHL tendon within retromalleolar cicatrix    POSTOPERATIVE DIAGNOSIS: Same    PROCEDURE:   Repair of right chronic insertional Achilles tendinitis/tear  Flexor hallucis longus (FHL) tendon transfer to posterior superior calcaneus  Excision of prominent posterior superior calcaneal exostosis (Nina's deformity)  Debridement of extensive retromalleolar and impingement including os trigonum with subtalar and ankle arthrotomies       EQUIPMENT USED: Ar[employer]rex Bio-Tenodesis screw (FHL transfer)    DETAILS OF PROCEDURE: I met with the patient as well as his girlfriend the preop holding area where I reevaluated him.  He continued to have pain with difficulty with great toe plantarflexion and generally her throughout the retromalleolar area including the Achilles tendon.  I Skin Skribe him and we discussed the operative procedure as well as expectations and risk.  Anesthesia kindly placed a sciatic level block for postoperative pain management and once in the operating suite general anesthesia was initiated.  A tourniquet was placed and he was positioned prone with upper extremity as well as head and neck positioning by anesthesia.  I confirmed the chest bolsters and noted no contact with the belly and check genitalia.  Pads were placed beneath both knees and prep and drape followed.    A timeout was performed including confirmation of IV antibiotics within an hour surgical cut time, operative site and available equipment.  The tourniquet was inflated after exsanguination of the leg and all dissection was performed loupe modification.    I use my standard lateral based J incision with particular care to protect the sural nerve  both through dissection and closure.  I noted inflammatory changes of the peritenon through the abnormal zone on the MRI and debrided this.  I then split the Achilles tendon centrally and abraded the central section,.  I then dissected through the Achilles tendon down to the retromalleolar space and carefully identified the FHL tendon at which point all dissection was kept lateral to prevent injury to the neurovascular bundle.    I debrided cicatrix about the FHL tendon but noted the tendon itself to be intact and without irregularity.  I later released the FHL tendon at the most distal extent within the sustentaculum sushma region and use this for the tendon transfer.  I wrapped the tendon with the fiber loop and measured and then brought attention back to the retromalleolar space.  There was extensive cicatrix involving the posterior capsular region of both the ankle and subtalar joint.  I debrided this and in doing so encountered an os trigonum which was excised.  The debridement was complete from the protected medial neurovascular bundle across the entire posterior ankle and subtalar joints to the fibula.  This gave excellent visualization into the ankle and subtalar joints from the posterior approach for at least an inch and there was no additional irregularity present.  The ankle and subtalar joints were taken through range of motion and noted be gliding without impingement or other irregularities.    My attention was then directed to the posterior superior calcaneal space.  The Achilles tendon split carried down to this area but not into the true insertion of.  There was a prominence onto the Achilles in this area and I remove the posterior superior Nina's exostosis with a rongeur and soften the edges.  I also used this is the entry zone for the FHL tendon transfer using the kit that included the guidepin.  Guidepin was placed taking care to avoid injury to the plantar neurovascular structures as it  penetrated near the origin of the plantar fascia.  The tendon had been measured at this point and I reamed a 5.5 canal without penetration of the plantar surface and then irrigated and brought the FHL tendon through the canal using the pull-through suture and then locked it with a 4.75 tenodesis screw.  Care was taken to appropriately tension the FHL and range of motion after locking with the screw revealed no motion.  I cut the pull-through suture beneath the skin and irrigated the wound again.    Repair of the Achilles tendon was performed with double locking fiber tape suture.  Wound was irrigated again and then closed in layers with deep Vicryl followed by subcuticular Vicryl the subcuticular running Monocryl and a few nylon sutures.  As mentioned, closure included protection of the sural nerve.    A dressing was applied followed by application of a heavily padded splint.  Care was taken to assure no pressure was placed on the back of the splint during hardening of.  I rechecked the patient in recovery and he has brisk cap refill in the toes with what appears to be a solid block.  No complications were encountered.      ____________________________________   Wm Hebert Chavez M.D.

## 2020-08-06 NOTE — DISCHARGE INSTRUCTIONS
ACTIVITY: Rest and take it easy for the first 24 hours.  A responsible adult is recommended to remain with you during that time.  It is normal to feel sleepy.  We encourage you to not do anything that requires balance, judgment or coordination.    MILD FLU-LIKE SYMPTOMS ARE NORMAL. YOU MAY EXPERIENCE GENERALIZED MUSCLE ACHES, THROAT IRRITATION, HEADACHE AND/OR SOME NAUSEA.    FOR 24 HOURS DO NOT:  Drive, operate machinery or run household appliances.  Drink beer or alcoholic beverages.   Make important decisions or sign legal documents.    SPECIAL INSTRUCTIONS: D/Josemanuel to care of family post uneventful stay in PACU 2.    DIET: To avoid nausea, slowly advance diet as tolerated, avoiding spicy or greasy foods for the first day.  Add more substantial food to your diet according to your physician's instructions.  INCREASE FLUIDS AND FIBER TO AVOID CONSTIPATION.    SURGICAL DRESSING/BATHING: Keep dressing clean, dry and intact until instructed otherwise by surgeon    FOLLOW-UP APPOINTMENT:  A follow-up appointment should be arranged with your doctor in, call to schedule.    You should CALL YOUR PHYSICIAN if you develop:  Fever greater than 101 degrees F.  Pain not relieved by medication, or persistent nausea or vomiting.  Excessive bleeding (blood soaking through dressing) or unexpected drainage from the wound.  Extreme redness or swelling around the incision site, drainage of pus or foul smelling drainage.  Inability to urinate or empty your bladder within 8 hours.  Problems with breathing or chest pain.    You should call 911 if you develop problems with breathing or chest pain.  If you are unable to contact your doctor or surgical center, you should go to the nearest emergency room or urgent care center.  Physician's telephone #: [phone]    If any questions arise, call your doctor.  If your doctor is not available, please feel free to call the Surgical Center at [phone].  The Center is open Monday through  Friday from 7AM to 7PM.  You can also call the HEALTH HOTLINE open 24 hours/day, 7 days/week and speak to a nurse at [phone], or toll free at [phone].    A registered nurse may call you a few days after your surgery to see how you are doing after your procedure.    MEDICATIONS: Resume taking daily medication.  Take prescribed pain medication with food.  If no medication is prescribed, you may take non-aspirin pain medication if needed.  PAIN MEDICATION CAN BE VERY CONSTIPATING.  Take a stool softener or laxative such as senokot, pericolace, or milk of magnesia if needed.    Prescription given for N/A.  Last pain medication given at N/A.    If your physician has prescribed pain medication that includes Acetaminophen (Tylenol), do not take additional Acetaminophen (Tylenol) while taking the prescribed medication.    Depression / Suicide Risk    As you are discharged from this [facility] facility, it is important to learn how to keep safe from harming yourself.    Recognize the warning signs:  · Abrupt changes in personality, positive or negative- including increase in energy   · Giving away possessions  · Change in eating patterns- significant weight changes-  positive or negative  · Change in sleeping patterns- unable to sleep or sleeping all the time   · Unwillingness or inability to communicate  · Depression  · Unusual sadness, discouragement and loneliness  · Talk of wanting to die  · Neglect of personal appearance   · Rebelliousness- reckless behavior  · Withdrawal from people/activities they love  · Confusion- inability to concentrate     If you or a loved one observes any of these behaviors or has concerns about self-harm, here's what you can do:  · Talk about it- your feelings and reasons for harming yourself  · Remove any means that you might use to hurt yourself (examples: pills, rope, extension cords, firearm)  · Get professional help from the community (Mental Health, Substance Abuse,  psychological counseling)  · Do not be alone:Call your Safe Contact- someone whom you trust who will be there for you.  · Call your local CRISIS HOTLINE 425-0239 or 146-695-9206  · Call your local Children's Mobile Crisis Response Team [place] (697) 451-3855 or www.[employer]  · Call the toll free National Suicide Prevention Hotlines   · National Suicide Prevention Lifeline 513-900-IAQG (1604)  [place] [employer] Line Network 800-SUICIDE (888-7423)    Peripheral Nerve Block Discharge Instructions from Same Day Surgery and Inpatient :    What to Expect - Lower Extremity  · The block may cause you to experience numbness and weakness in your {LEG LOCATION PNB:038226} on the same side as your surgery  · Numbness, tingling and / or weakness are all normal. For some people, this may be an unpleasant sensation  · These issues will be resolved when the local anesthetic wears off   · You may experience numbness and tingling in your thigh on the same side as your surgery if the block medicine was injected at your groin area  · Numbness will make it difficult to walk  · You may have problems with balance and walking so be very careful   · Follow your surgeon's direction regarding weight bearing on your surgical limb  Be very careful with your numb limb    Precautions  · The numbness may affect your balance  · Be careful when walking or moving around  · Your leg may be weak: be very careful putting weight on it  · If your surgeon did not specify a time, you should not bear weight for 24 hours  · Be sure to ask for help when you need it  · It is better to have help than to fall and hurt yourself    Shoulder Surgery Side Effects  · In addition to numbness and weakness you may experience other symptoms  · Other nerves that are close to those nerves injected can also be affected by local anesthesia  · You may experience a hoarseness in your voice  · Your breathing may feel different  · You may also notice drooping of your  "eyelid, pupil constriction, and decreased sweating, on the side of your surgery  · All of these side effects are normal and will resolve when the local anesthetic wears off   Prevent Injury  · Protect the limb like a baby  · Beware of exposing your limb to extreme heat or cold or trauma  · The limb may be injured without you noticing because it is numb  · Keep the limb elevated whenever possible  · Do not sleep on the limb  · Change the position of the limb regularly  · Avoid putting pressure on your surgical limb  Pain Control  · The initial block on the day of surgery will make your extremity feel \"numb\"  · Any consecutive injection including prior to discharge from the hospital will make your extremity feel \"numb\"  · You may feel an aching or burning when the local anesthesia starts to wear off  · Take pain pills as prescribed by your surgeon  · Call your surgeon or anesthesiologist if you do not have adequate pain control  ·   "

## 2020-08-07 ENCOUNTER — PHARMACY VISIT (OUTPATIENT)
Dept: PHARMACY | Facility: MEDICAL CENTER | Age: 30
End: 2020-08-07
Payer: COMMERCIAL

## 2020-08-11 NOTE — ANESTHESIA POSTPROCEDURE EVALUATION
Patient: Eddie Daniel    Procedure Summary     Date: 08/06/20 Room / Location:  OR 03 / SURGERY [facility]    Anesthesia Start: 0659 Anesthesia Stop: 0817    Procedures:       REPAIR, TENDON, ACHILLES (Right Ankle)      EXCISION, EXOSTOSIS - POSTEROSUPERIOR CALCANEAL (Right Ankle)      TRANSFER, TENDON - FLEXOR HALLICUS LONGUS TO CANCANEUS (Right Ankle) Diagnosis: (ACHILLES TENDINITIS)    Surgeon: Wm Hebert Chavez M.D. Responsible Provider: Dimitri Harrison M.D.    Anesthesia Type: general, peripheral nerve block ASA Status: 2          Final Anesthesia Type: general, peripheral nerve block  Last vitals  BP WNL       Temp WNL   Pulse WNL   Resp WNL   SpO2 WNL     Anesthesia Post Evaluation    Patient location during evaluation: PACU  Patient participation: complete - patient participated  Level of consciousness: awake and alert    Airway patency: patent  Anesthetic complications: no  Cardiovascular status: hemodynamically stable  Respiratory status: acceptable  Hydration status: euvolemic    PONV: none           Nurse Pain Score: 0 (NPRS)

## 2020-09-10 ENCOUNTER — PHYSICAL THERAPY (OUTPATIENT)
Dept: PHYSICAL THERAPY | Facility: REHABILITATION | Age: 30
End: 2020-09-10
Attending: ORTHOPAEDIC SURGERY
Payer: COMMERCIAL

## 2020-09-10 DIAGNOSIS — M25.774 OSTEOPHYTE, RIGHT FOOT: ICD-10-CM

## 2020-09-10 DIAGNOSIS — M76.61 ACHILLES TENDINITIS, RIGHT LEG: ICD-10-CM

## 2020-09-10 PROCEDURE — 97161 PT EVAL LOW COMPLEX 20 MIN: CPT

## 2020-09-10 PROCEDURE — 97014 ELECTRIC STIMULATION THERAPY: CPT

## 2020-09-10 PROCEDURE — 97110 THERAPEUTIC EXERCISES: CPT

## 2020-09-10 ASSESSMENT — ACTIVITIES OF DAILY LIVING (ADL): POOR_BALANCE: 1

## 2020-09-10 ASSESSMENT — ENCOUNTER SYMPTOMS
PAIN SCALE: 2
QUALITY: ACHING
PAIN TIMING: IN THE EVENING
QUALITY: BURNING
PAIN SCALE AT LOWEST: 0
PAIN SCALE AT HIGHEST: 5
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
## 2020-09-25 ENCOUNTER — PHYSICAL THERAPY (OUTPATIENT)
Dept: PHYSICAL THERAPY | Facility: REHABILITATION | Age: 30
End: 2020-09-25
Attending: ORTHOPAEDIC SURGERY
Payer: COMMERCIAL

## 2020-09-25 DIAGNOSIS — M76.61 ACHILLES TENDINITIS, RIGHT LEG: ICD-10-CM

## 2020-09-25 DIAGNOSIS — M25.774 OSTEOPHYTE, RIGHT FOOT: ICD-10-CM

## 2020-09-25 PROCEDURE — 97110 THERAPEUTIC EXERCISES: CPT

## 2020-09-25 NOTE — OP THERAPY DAILY TREATMENT
"  Outpatient Physical Therapy  DAILY TREATMENT     [facility] [address].  [address]  [place] [address]  Phone:  [phone]  Fax:  [phone]    Date: 09/25/2020    Patient: Eddie Daniel  YOB: 1990  MRN: [id number]     Time Calculation    Start time: 0732  Stop time: 0815 Time Calculation (min): 43 minutes         Chief Complaint: Ankle Problem and Foot Problem    Visit #: 2    SUBJECTIVE:  I'm progressing well.  Reports he is ok to walk around his home and drive without the boot.  Ok to wean from the boot for community ambulation over the next couple weeks.  States he starting to get better foot control and now able to lift the big toe up.     OBJECTIVE:      Therapeutic Exercises (CPT 16695):     1. NuStep, L0-3 x 8 min    2. DF str on rocker, 2x30\"    3. Rocker board, AP and lateral x 2 min ea    4. Ball bridge, 10\" x 15    5. Side plank, 1 x 20\" ea side    6. Standing heel raise, x 15, cung to shift weight to R LE to decrease compensation.     7. Shuttle, Squat: bilat 8C x 40 (various angles to challenge DF), SL 4C x 20        Access Code: [email or web address]   URL: [email or web address]   Date: 09/25/2020   Prepared by: Yajaira Sevilla     Exercises   Marching Bridge - 10 reps - 3 sets - 2x daily   Standing Heel Raise - 10 reps - 2 sets - 2x daily   Side Plank on Knees - 3 sets - 15 sec Hold - 2x daily     Time-based treatments/modalities:    Physical Therapy Timed Treatment Charges  Therapeutic exercise minutes (CPT 60785): 43 minutes    ASSESSMENT:   Response to treatment: Improved foot control in seated allowing progression to standing tasks and weaning from brace with good tolerance. Limited R glute and R PF power, which affect control in R LE and stance phase of gait. Focus on above HEP and follow up for progression in 2 weeks. Limited in attendance by financial burden of copay.     PLAN/RECOMMENDATIONS:   Plan for treatment: therapy treatment to " continue next visit.  Planned interventions for next visit: continue with current treatment.

## 2020-10-08 ENCOUNTER — IMMUNIZATION (OUTPATIENT)
Dept: OCCUPATIONAL MEDICINE | Facility: CLINIC | Age: 30
End: 2020-10-08

## 2020-10-08 DIAGNOSIS — Z23 NEED FOR VACCINATION: ICD-10-CM

## 2020-10-08 PROCEDURE — 90686 IIV4 VACC NO PRSV 0.5 ML IM: CPT | Performed by: NURSE PRACTITIONER

## 2020-10-16 ENCOUNTER — PHYSICAL THERAPY (OUTPATIENT)
Dept: PHYSICAL THERAPY | Facility: REHABILITATION | Age: 30
End: 2020-10-16
Attending: ORTHOPAEDIC SURGERY
Payer: COMMERCIAL

## 2020-10-16 DIAGNOSIS — M25.774 OSTEOPHYTE, RIGHT FOOT: ICD-10-CM

## 2020-10-16 DIAGNOSIS — M76.61 ACHILLES TENDINITIS, RIGHT LEG: ICD-10-CM

## 2020-10-16 PROCEDURE — 97110 THERAPEUTIC EXERCISES: CPT

## 2020-10-16 NOTE — OP THERAPY DAILY TREATMENT
"  Outpatient Physical Therapy  DAILY TREATMENT     [facility] [address].  [address]  [place] [address]  Phone:  [phone]  Fax:  [phone]    Date: 10/16/2020    Patient: Eddie Daniel  YOB: 1990  MRN: [id number]     Time Calculation    Start time: 0715  Stop time: 0745 Time Calculation (min): 30 minutes         Chief Complaint: Ankle Problem    Visit #: 3    SUBJECTIVE:  I've been sore in the achilles the last 2 days due to \"falling in a hole when I was taking the trash out.\" Reports it getting better, but feeling a little tight today. Has fully weaned from the boot.  Reports being able to perform all functional mobility, but with a limit due to endurance.      OBJECTIVE:      Therapeutic Exercises (CPT 54337):     1. Elliptical, L5 x 5 min total (1 of the mins backward)    2. DF str on rocker, 2x30\"    3. Heel raise negatives, bilat x 15 wtih 5\" decent. , Unable to complete in SLS    4. Squats, re-ed x 5 min, x 20 reps complete    5. 'horse kicks', L1 x 20 ea side    7. Shuttle, Squat: bilat 8C x 40 (various angles to challenge DF), SL 4C x 20      Time-based treatments/modalities:    Physical Therapy Timed Treatment Charges  Therapeutic exercise minutes (CPT 37746): 30 minutes    ASSESSMENT:   Response to treatment: Able to advance standing balance challenges with good self attention to arch doming. Gait with only a mild push of deviation due to pain in achilles at end range.     PLAN/RECOMMENDATIONS:   Plan for treatment: therapy treatment to continue next visit.  Planned interventions for next visit: continue with current treatment.       "

## 2020-10-23 ENCOUNTER — APPOINTMENT (OUTPATIENT)
Dept: PHYSICAL THERAPY | Facility: REHABILITATION | Age: 30
End: 2020-10-23
Attending: ORTHOPAEDIC SURGERY
Payer: COMMERCIAL

## 2020-11-13 ENCOUNTER — APPOINTMENT (OUTPATIENT)
Dept: PHYSICAL THERAPY | Facility: REHABILITATION | Age: 30
End: 2020-11-13
Attending: ORTHOPAEDIC SURGERY
Payer: COMMERCIAL

## 2021-01-19 ENCOUNTER — TELEPHONE (OUTPATIENT)
Dept: PHYSICAL THERAPY | Facility: REHABILITATION | Age: 31
End: 2021-01-19

## 2021-01-19 NOTE — OP THERAPY DISCHARGE SUMMARY
Outpatient Physical Therapy  DISCHARGE SUMMARY NOTE      [facility] [address].  [address]  [place] [address]  Phone:  [phone]  Fax:  [phone]    Date of Visit: 01/19/2021    Patient: Eddie Daniel  YOB: 1990  MRN: [id number]     Referring Provider: No referring provider defined for this encounter.   Referring Diagnosis No admission diagnoses are documented for this encounter.         Functional Assessment Used        Your patient is being discharged from Physical Therapy with the following comments:   · Patient has failed to schedule or reschedule follow-up visits    Comments:  Mr. Daniel was seen for 3 PT sessions treating his F foot s/p:   -Repair of right chronic insertional Achilles tendinitis/tear  -Flexor hallucis longus (FHL) tendon transfer to posterior superior calcaneus  -Excision of prominent posterior superior calcaneal exostosis (Nina's deformity)  -Debridement of extensive retromalleolar and impingement including os trigonum with subtalar and ankle arthrotomies.      Overall, pt was progressing well through rehab phases.  His attendance was limited by financial burden of copay.  He has not followed up since 10/16/20. D/C due to lapse in care.     Yajaira Sevilla, PT, DPT    Date: 1/19/2021

## 2021-05-17 ENCOUNTER — TELEPHONE (OUTPATIENT)
Dept: MEDICAL GROUP | Facility: MEDICAL CENTER | Age: 31
End: 2021-05-17

## 2021-05-17 DIAGNOSIS — M25.571 ACUTE RIGHT ANKLE PAIN: ICD-10-CM

## 2021-05-17 NOTE — TELEPHONE ENCOUNTER
1. Caller Name: Eddie Daniel                      Call Back Number: [phone] (home)       How would the patient prefer to be contacted with a response: Phone call OK to leave a detailed message    2. SPECIFIC Action To Be Taken: Referral pending, please sign.    3. Diagnosis/Clinical Reason for Request:Painful achilles, decreased plantar sensation     4. Specialty & Provider Name/Lab/Imaging Location: [facility] Dr. [facility]     5. Is appointment scheduled for requested order/referral: yes - Patient was seen May 4, 2021, insurance needs referral

## 2021-08-01 ENCOUNTER — APPOINTMENT (OUTPATIENT)
Dept: RADIOLOGY | Facility: MEDICAL CENTER | Age: 31
End: 2021-08-01
Attending: EMERGENCY MEDICINE
Payer: MEDICAID

## 2021-08-01 ENCOUNTER — HOSPITAL ENCOUNTER (EMERGENCY)
Facility: MEDICAL CENTER | Age: 31
End: 2021-08-01
Attending: EMERGENCY MEDICINE
Payer: MEDICAID

## 2021-08-01 VITALS
HEIGHT: 69 IN | RESPIRATION RATE: 18 BRPM | WEIGHT: 175 LBS | BODY MASS INDEX: 25.92 KG/M2 | SYSTOLIC BLOOD PRESSURE: 110 MMHG | TEMPERATURE: 97.5 F | DIASTOLIC BLOOD PRESSURE: 66 MMHG | HEART RATE: 57 BPM | OXYGEN SATURATION: 91 %

## 2021-08-01 DIAGNOSIS — S16.1XXA STRAIN OF NECK MUSCLE, INITIAL ENCOUNTER: ICD-10-CM
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
## 2021-08-01 DIAGNOSIS — S22.39XA CLOSED FRACTURE OF ONE RIB, UNSPECIFIED LATERALITY, INITIAL ENCOUNTER: ICD-10-CM

## 2021-08-01 DIAGNOSIS — T07.XXXA MULTIPLE ABRASIONS: ICD-10-CM

## 2021-08-01 DIAGNOSIS — S09.90XA CLOSED HEAD INJURY, INITIAL ENCOUNTER: ICD-10-CM

## 2021-08-01 DIAGNOSIS — S43.005A SHOULDER SEPARATION, LEFT, INITIAL ENCOUNTER: ICD-10-CM

## 2021-08-01 LAB
ABO GROUP BLD: NORMAL
ALBUMIN SERPL BCP-MCNC: 4.5 G/DL (ref 3.2–4.9)
ALBUMIN/GLOB SERPL: 2 G/DL
ALP SERPL-CCNC: 81 U/L (ref 30–99)
ALT SERPL-CCNC: 30 U/L (ref 2–50)
ANION GAP SERPL CALC-SCNC: 13 MMOL/L (ref 7–16)
APTT PPP: 26.7 SEC (ref 24.7–36)
AST SERPL-CCNC: 37 U/L (ref 12–45)
BILIRUB SERPL-MCNC: 0.4 MG/DL (ref 0.1–1.5)
BLD GP AB SCN SERPL QL: NORMAL
BUN SERPL-MCNC: 13 MG/DL (ref 8–22)
CALCIUM SERPL-MCNC: 9.3 MG/DL (ref 8.5–10.5)
CHLORIDE SERPL-SCNC: 102 MMOL/L (ref 96–112)
CO2 SERPL-SCNC: 22 MMOL/L (ref 20–33)
CREAT SERPL-MCNC: 1.17 MG/DL (ref 0.5–1.4)
ERYTHROCYTE [DISTWIDTH] IN BLOOD BY AUTOMATED COUNT: 45.1 FL (ref 35.9–50)
ETHANOL BLD-MCNC: <10.1 MG/DL (ref 0–10)
GLOBULIN SER CALC-MCNC: 2.3 G/DL (ref 1.9–3.5)
GLUCOSE SERPL-MCNC: 90 MG/DL (ref 65–99)
HCT VFR BLD AUTO: 42 % (ref 42–52)
HGB BLD-MCNC: 13.6 G/DL (ref 14–18)
INR PPP: 1.07 (ref 0.87–1.13)
MCH RBC QN AUTO: 29.2 PG (ref 27–33)
MCHC RBC AUTO-ENTMCNC: 32.4 G/DL (ref 33.7–35.3)
MCV RBC AUTO: 90.1 FL (ref 81.4–97.8)
PLATELET # BLD AUTO: 251 K/UL (ref 164–446)
PMV BLD AUTO: 9.4 FL (ref 9–12.9)
POTASSIUM SERPL-SCNC: 3.5 MMOL/L (ref 3.6–5.5)
PROT SERPL-MCNC: 6.8 G/DL (ref 6–8.2)
PROTHROMBIN TIME: 13.6 SEC (ref 12–14.6)
RBC # BLD AUTO: 4.66 M/UL (ref 4.7–6.1)
RH BLD: NORMAL
SODIUM SERPL-SCNC: 137 MMOL/L (ref 135–145)
WBC # BLD AUTO: 9.4 K/UL (ref 4.8–10.8)

## 2021-08-01 PROCEDURE — 71045 X-RAY EXAM CHEST 1 VIEW: CPT

## 2021-08-01 PROCEDURE — 96376 TX/PRO/DX INJ SAME DRUG ADON: CPT | Mod: XU

## 2021-08-01 PROCEDURE — A9270 NON-COVERED ITEM OR SERVICE: HCPCS | Performed by: EMERGENCY MEDICINE

## 2021-08-01 PROCEDURE — 82077 ASSAY SPEC XCP UR&BREATH IA: CPT

## 2021-08-01 PROCEDURE — 700117 HCHG RX CONTRAST REV CODE 255: Performed by: EMERGENCY MEDICINE

## 2021-08-01 PROCEDURE — 85610 PROTHROMBIN TIME: CPT

## 2021-08-01 PROCEDURE — 307740 HCHG GREEN TRAUMA TEAM SERVICES

## 2021-08-01 PROCEDURE — 73110 X-RAY EXAM OF WRIST: CPT | Mod: RT

## 2021-08-01 PROCEDURE — 700111 HCHG RX REV CODE 636 W/ 250 OVERRIDE (IP): Performed by: EMERGENCY MEDICINE

## 2021-08-01 PROCEDURE — 86900 BLOOD TYPING SEROLOGIC ABO: CPT

## 2021-08-01 PROCEDURE — 86850 RBC ANTIBODY SCREEN: CPT

## 2021-08-01 PROCEDURE — 73560 X-RAY EXAM OF KNEE 1 OR 2: CPT | Mod: RT

## 2021-08-01 PROCEDURE — 73100 X-RAY EXAM OF WRIST: CPT | Mod: LT

## 2021-08-01 PROCEDURE — 99285 EMERGENCY DEPT VISIT HI MDM: CPT

## 2021-08-01 PROCEDURE — 73000 X-RAY EXAM OF COLLAR BONE: CPT | Mod: LT

## 2021-08-01 PROCEDURE — 96374 THER/PROPH/DIAG INJ IV PUSH: CPT | Mod: XU

## 2021-08-01 PROCEDURE — 85027 COMPLETE CBC AUTOMATED: CPT

## 2021-08-01 PROCEDURE — 85730 THROMBOPLASTIN TIME PARTIAL: CPT

## 2021-08-01 PROCEDURE — 72131 CT LUMBAR SPINE W/O DYE: CPT

## 2021-08-01 PROCEDURE — 80053 COMPREHEN METABOLIC PANEL: CPT

## 2021-08-01 PROCEDURE — 86901 BLOOD TYPING SEROLOGIC RH(D): CPT

## 2021-08-01 PROCEDURE — 96375 TX/PRO/DX INJ NEW DRUG ADDON: CPT

## 2021-08-01 PROCEDURE — 73030 X-RAY EXAM OF SHOULDER: CPT | Mod: LT

## 2021-08-01 PROCEDURE — 70450 CT HEAD/BRAIN W/O DYE: CPT

## 2021-08-01 PROCEDURE — 72125 CT NECK SPINE W/O DYE: CPT

## 2021-08-01 PROCEDURE — 72128 CT CHEST SPINE W/O DYE: CPT

## 2021-08-01 PROCEDURE — 72170 X-RAY EXAM OF PELVIS: CPT

## 2021-08-01 PROCEDURE — 700102 HCHG RX REV CODE 250 W/ 637 OVERRIDE(OP): Performed by: EMERGENCY MEDICINE

## 2021-08-01 PROCEDURE — 71260 CT THORAX DX C+: CPT

## 2021-08-01 RX ORDER — METHOCARBAMOL 500 MG/1
1000 TABLET, FILM COATED ORAL ONCE
Status: COMPLETED | OUTPATIENT
Start: 2021-08-01 | End: 2021-08-01

## 2021-08-01 RX ORDER — ONDANSETRON 2 MG/ML
INJECTION INTRAMUSCULAR; INTRAVENOUS
Status: COMPLETED | OUTPATIENT
Start: 2021-08-01 | End: 2021-08-01

## 2021-08-01 RX ORDER — ONDANSETRON 2 MG/ML
INJECTION INTRAMUSCULAR; INTRAVENOUS
Status: DISCONTINUED
Start: 2021-08-01 | End: 2021-08-02 | Stop reason: HOSPADM

## 2021-08-01 RX ORDER — MORPHINE SULFATE 10 MG/ML
6 INJECTION, SOLUTION INTRAMUSCULAR; INTRAVENOUS ONCE
Status: COMPLETED | OUTPATIENT
Start: 2021-08-01 | End: 2021-08-01

## 2021-08-01 RX ORDER — LIDOCAINE 50 MG/G
1 PATCH TOPICAL EVERY 24 HOURS
Qty: 10 PATCH | Refills: 0 | Status: SHIPPED | OUTPATIENT
Start: 2021-08-01 | End: 2022-05-25

## 2021-08-01 RX ORDER — KETOROLAC TROMETHAMINE 30 MG/ML
15 INJECTION, SOLUTION INTRAMUSCULAR; INTRAVENOUS ONCE
Status: COMPLETED | OUTPATIENT
Start: 2021-08-01 | End: 2021-08-01

## 2021-08-01 RX ORDER — MORPHINE SULFATE 4 MG/ML
INJECTION, SOLUTION INTRAMUSCULAR; INTRAVENOUS
Status: COMPLETED | OUTPATIENT
Start: 2021-08-01 | End: 2021-08-01

## 2021-08-01 RX ORDER — METHOCARBAMOL 750 MG/1
750 TABLET, FILM COATED ORAL 3 TIMES DAILY
Qty: 30 TABLET | Refills: 0 | Status: SHIPPED | OUTPATIENT
Start: 2021-08-01 | End: 2021-08-11

## 2021-08-01 RX ORDER — MORPHINE SULFATE 10 MG/ML
INJECTION, SOLUTION INTRAMUSCULAR; INTRAVENOUS
Status: DISCONTINUED
Start: 2021-08-01 | End: 2021-08-02 | Stop reason: HOSPADM

## 2021-08-01 RX ADMIN — MORPHINE SULFATE 6 MG: 4 INJECTION INTRAVENOUS at 21:09

## 2021-08-01 RX ADMIN — MORPHINE SULFATE 6 MG: 10 INJECTION INTRAVENOUS at 22:04

## 2021-08-01 RX ADMIN — METHOCARBAMOL 1000 MG: 500 TABLET ORAL at 22:30

## 2021-08-01 RX ADMIN — IOHEXOL 100 ML: 350 INJECTION, SOLUTION INTRAVENOUS at 22:00

## 2021-08-01 RX ADMIN — ONDANSETRON 4 MG: 2 INJECTION INTRAMUSCULAR; INTRAVENOUS at 21:09

## 2021-08-01 RX ADMIN — KETOROLAC TROMETHAMINE 15 MG: 30 INJECTION, SOLUTION INTRAMUSCULAR at 22:30

## 2021-08-01 ASSESSMENT — FIBROSIS 4 INDEX: FIB4 SCORE: 0.45

## 2021-08-01 ASSESSMENT — PAIN DESCRIPTION - PAIN TYPE: TYPE: ACUTE PAIN

## 2021-08-02 NOTE — ED NOTES
Sling applied to patient left arm/shoulder.  Patient given incentive spirometer and educated on how to use and the importance of use.
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
## 2021-08-02 NOTE — ED NOTES
PIV removed, catheter intact. Discharge education provided. Discharge paperwork signed by pt. Patient given work note per ERP. Prescriptions to be picked up by pt. All questions answered. All belongings with pt. Pt ambulated to lobby independently with steady gait accompanied by spouse.

## 2021-08-06 ENCOUNTER — TELEPHONE (OUTPATIENT)
Dept: MEDICAL GROUP | Facility: MEDICAL CENTER | Age: 31
End: 2021-08-06

## 2021-08-06 DIAGNOSIS — S43.006A: ICD-10-CM

## 2021-08-06 NOTE — TELEPHONE ENCOUNTER
1. Caller Name: [phone] (home)            Call Back Number: Eddie Daniel      How would the patient prefer to be contacted with a response: Phone call OK to leave a detailed message    2. SPECIFIC Action To Be Taken: Referral pending, please sign.    3. Diagnosis/Clinical Reason for Request:  Shoulder- emergent    4. Specialty & Provider Name/Lab/Imaging Location: Dr. Isabel    5. Is appointment scheduled for requested order/referral: yes - Today    Patient was informed they will receive a return phone call from the office ONLY if there are any questions before processing their request. Advised to call back if they haven't received a call from the referral department in 5 days.

## 2021-10-18 ENCOUNTER — PHYSICAL THERAPY (OUTPATIENT)
Dept: PHYSICAL THERAPY | Facility: REHABILITATION | Age: 31
End: 2021-10-18
Attending: ORTHOPAEDIC SURGERY
Payer: MEDICAID

## 2021-10-18 DIAGNOSIS — S43.102A UNSPECIFIED DISLOCATION OF LEFT ACROMIOCLAVICULAR JOINT, INITIAL ENCOUNTER: ICD-10-CM

## 2021-10-18 PROCEDURE — 97161 PT EVAL LOW COMPLEX 20 MIN: CPT

## 2021-10-18 SDOH — ECONOMIC STABILITY: GENERAL: QUALITY OF LIFE: GOOD

## 2021-10-18 ASSESSMENT — ENCOUNTER SYMPTOMS
QUALITY: DISCOMFORT
QUALITY: TIGHT
PAIN TIMING: WHEN ACTIVE
EXACERBATED BY: SLEEPING
PAIN LOCATION: L SHOULDER
ALLEVIATING FACTORS: ACTIVITY MODIFICATION
PAIN TIMING: EVERY DAY
ALLEVIATING FACTORS: POSITION CHANGE
EXACERBATED BY: LIFTING
ALLEVIATING FACTORS: PAIN MEDICATION
PAIN SCALE AT HIGHEST: 8
QUALITY: ACHING
ALLEVIATING FACTORS: RELAXATION
EXACERBATED BY: FATIGUE
EXACERBATED BY: ACTIVITY
PAIN TIMING: DURING SLEEP
ALLEVIATING FACTORS: REST
EXACERBATED BY: OVERHEAD ACTIVITY
PAIN TIMING: INTERMITTENT
PAIN SCALE: 5
PAIN SCALE AT LOWEST: 0
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
## 2021-10-26 ENCOUNTER — PHYSICAL THERAPY (OUTPATIENT)
Dept: PHYSICAL THERAPY | Facility: REHABILITATION | Age: 31
End: 2021-10-26
Attending: ORTHOPAEDIC SURGERY
Payer: MEDICAID

## 2021-10-26 DIAGNOSIS — S43.102A UNSPECIFIED DISLOCATION OF LEFT ACROMIOCLAVICULAR JOINT, INITIAL ENCOUNTER: ICD-10-CM

## 2021-10-26 PROCEDURE — 97110 THERAPEUTIC EXERCISES: CPT

## 2021-10-26 NOTE — OP THERAPY DAILY TREATMENT
Outpatient Physical Therapy  DAILY TREATMENT     [facility] Physical [address].  [address]  [place] [address]  Phone:  [phone]  Fax:  [phone]    Date: 10/26/2021    Patient: Eddie Daniel  YOB: 1990  MRN: [id number]     Time Calculation    Start time: 0945  Stop time: 1024 Time Calculation (min): 39 minutes         Chief Complaint: Shoulder Problem    Visit #: 2    SUBJECTIVE:  The pt reports mild soreness in his shoulder from the cold weather. He denies any complication following participation in therex program. He is agreeable to PT today.     OBJECTIVE:  Current objective measures: TTP at R abductor policis longus and extensor pollicus longus          Therapeutic Exercises (CPT 07835):     1. UBE , L1, 6 minutes (3 forward, 3 back)    2. Pulleys, 2x10    3. Ball flexion AAROM, 10x    4. T-band rows, 3x10 , Black t-band    5. T-band ER, 3x10, Black t-band    6. T-band IR, 3x10, Black t-band    7. Shoulder flexion AROM, 2x10, Mirror cue for feedback    8. Shoulder abduction AROM, 2x10    9. Shoulder scaption AROM, 2x10    10. Serratus punches, 3x10    11. Supine shoulder ER AROM, 2x10      Therapeutic Exercise Summary: Pt educated to continue with HEP, with added shoulder AROM within pain free ROM. Pt given information on consideration of a R thumb splint to manage his persistent pain in the R 1st digit. Pt educated to continue with adherence to surgical protocol including avoiding end ranges of motion, lifting with the L UE, weight bearing through the L, and rapid L UE movements. Pt expressed understanding    Access Code: [email or web address]  URL: [email or web address]  Date: 10/18/2021  Prepared by: Shirley Whaley    Exercises  Shoulder External Rotation with Anchored Resistance - 1 x daily - 7 x weekly - 3 sets - 10 reps  Shoulder Internal Rotation with Resistance - 1 x daily - 7 x weekly - 3 sets - 10 reps  Supine Shoulder Flexion Extension AAROM with Dowel -  1 x daily - 7 x weekly - 3 sets - 10 reps  Supine Shoulder Abduction AAROM with Dowel - 1 x daily - 7 x weekly - 3 sets - 10 reps  Standing Shoulder Row with Anchored Resistance - 1 x daily - 7 x weekly - 3 sets - 10 reps        Time-based treatments/modalities:    Physical Therapy Timed Treatment Charges  Therapeutic exercise minutes (CPT 57770): 38 minutes  ASSESSMENT:   Response to treatment: The pt tolerated gentle progression of shoulder AROM within pain-free ranges and gentle stability exercises without an increase in shoulder symptoms. He demonstrated good scapular mechanics with initiation of shoulder AROM on the L. He presents with significant tenderness and discomfort in his R thumb and was encouraged to consider use of a splint to allow for rest of the joint prior to his upcoming visit with his surgeon. The pt will continue to benefit from skilled physical therapy intervention to maximize his return to active PLOF.     PLAN/RECOMMENDATIONS:   Plan for treatment: therapy treatment to continue next visit.  Planned interventions for next visit: continue with current treatment.  Continue with shoulder progression from AAROM to AROM, progress gentle resistance and shoulder stability

## 2021-11-01 ENCOUNTER — PHYSICAL THERAPY (OUTPATIENT)
Dept: PHYSICAL THERAPY | Facility: REHABILITATION | Age: 31
End: 2021-11-01
Attending: ORTHOPAEDIC SURGERY
Payer: MEDICAID

## 2021-11-01 DIAGNOSIS — S43.102A UNSPECIFIED DISLOCATION OF LEFT ACROMIOCLAVICULAR JOINT, INITIAL ENCOUNTER: ICD-10-CM

## 2021-11-01 PROCEDURE — 97110 THERAPEUTIC EXERCISES: CPT

## 2021-11-01 NOTE — OP THERAPY DAILY TREATMENT
Outpatient Physical Therapy  DAILY TREATMENT     [facility] Physical [address].  [address]  [place] [address]  Phone:  [phone]  Fax:  [phone]    Date: 11/01/2021    Patient: Eddie Daniel  YOB: 1990  MRN: [id number]     Time Calculation    Start time: 1115  Stop time: 1153 Time Calculation (min): 38 minutes         Chief Complaint: Shoulder Problem    Visit #: 3    SUBJECTIVE:  The pt states that he is experiencing some tightness in his shoulder, but no significant pain. He states that his R thumb pain is worsening, and is very sore today. He is agreeable to PT.     OBJECTIVE:          Therapeutic Exercises (CPT 90207):     1. UBE , L2, 6 minutes (3 forward, 3 back)    2. Pulleys, 2x10    3. Ball flexion AAROM, 10x    4. T-band rows, 3x10, Black t-band    5. T-band ER, 3x10, Black t-band    6. T-band IR, 3x10, Black t-band    7. Prone Houghston's x4, 2x10    8. Side-lying GH ER, 3x10    9. T-band oscillation, 3x10, 2# dumbbell     10. Serratus punches, 3x10    11. Upper trap stretch      Therapeutic Exercise Summary: Pt educated to continue with HEP.     Access Code: [email or web address]  URL: [email or web address]  Date: 10/18/2021  Prepared by: Shirley Whaley    Exercises  Shoulder External Rotation with Anchored Resistance - 1 x daily - 7 x weekly - 3 sets - 10 reps  Shoulder Internal Rotation with Resistance - 1 x daily - 7 x weekly - 3 sets - 10 reps  Supine Shoulder Flexion Extension AAROM with Dowel - 1 x daily - 7 x weekly - 3 sets - 10 reps  Supine Shoulder Abduction AAROM with Dowel - 1 x daily - 7 x weekly - 3 sets - 10 reps  Standing Shoulder Row with Anchored Resistance - 1 x daily - 7 x weekly - 3 sets - 10 reps        Time-based treatments/modalities:    Physical Therapy Timed Treatment Charges  Therapeutic exercise minutes (CPT 93506): 38 minutes    ASSESSMENT:   Response to treatment: The pt tolerated a progression in therex for L shoulder ROM and  stability without an increase in symptoms. He does continue to be limited by pain in his R thumb and was again encouraged to consider a splint for the thumb. The pt will continue to benefit from skilled physical therapy intervention to maximize his return to active PLOF.     PLAN/RECOMMENDATIONS:   Plan for treatment: therapy treatment to continue next visit.  Planned interventions for next visit: continue with current treatment. Continue with progression of shoulder mobility and stabilization to tolerance.

## 2021-11-04 ENCOUNTER — PHYSICAL THERAPY (OUTPATIENT)
Dept: PHYSICAL THERAPY | Facility: REHABILITATION | Age: 31
End: 2021-11-04
Attending: ORTHOPAEDIC SURGERY
Payer: MEDICAID

## 2021-11-04 DIAGNOSIS — S43.102A UNSPECIFIED DISLOCATION OF LEFT ACROMIOCLAVICULAR JOINT, INITIAL ENCOUNTER: ICD-10-CM

## 2021-11-04 PROCEDURE — 97110 THERAPEUTIC EXERCISES: CPT

## 2021-11-04 NOTE — OP THERAPY DAILY TREATMENT
"  Outpatient Physical Therapy  DAILY TREATMENT     [facility] Physical [address].  [address]  [place] [address]  Phone:  [phone]  Fax:  [phone]    Date: 11/04/2021    Patient: Eddie Daniel  YOB: 1990  MRN: [id number]     Time Calculation    Start time: 1531  Stop time: 1609 Time Calculation (min): 38 minutes         Chief Complaint: Shoulder Problem and Hand Problem    Visit #: 4    SUBJECTIVE:  The pt states that his L shoulder is stiff and sore today, but he denies any new activities that would have aggravated his symptoms. He bought a R wrist splint but states that he continues to experience pain in the R wrist and thumb. The pt has a follow up visit with his orthopedist on 11/10/21.     OBJECTIVE:          Therapeutic Exercises (CPT 51978):     1. UBE , L3, 6 minutes    2. Pulleys, 2x10    3. Wall flexion AAROM, 10x    4. T-band rows, 3x10, Blue t-band    5. T-band ER, 3x10, Black t-band    6. T-band IR, 3x10, Black t-band    7. Prone Houghston's x4, 2x10    8. Side-lying GH ER, 3x10, 2#    9. T-band oscillation, 2x30\", Teal t-band    10. Serratus punches, Not today    11. Upper trap stretch, 10x5\"    12. Wall flexion to abduction, 10x    13. T-band lat pulldown, 3x10, Blue t-band      Therapeutic Exercise Summary: Pt educated to continue with HEP.     Access Code: [email or web address]  URL: [email or web address]  Date: 10/18/2021  Prepared by: Shirley Whaley    Exercises  Shoulder External Rotation with Anchored Resistance - 1 x daily - 7 x weekly - 3 sets - 10 reps  Shoulder Internal Rotation with Resistance - 1 x daily - 7 x weekly - 3 sets - 10 reps  Supine Shoulder Flexion Extension AAROM with Dowel - 1 x daily - 7 x weekly - 3 sets - 10 reps  Supine Shoulder Abduction AAROM with Dowel - 1 x daily - 7 x weekly - 3 sets - 10 reps  Standing Shoulder Row with Anchored Resistance - 1 x daily - 7 x weekly - 3 sets - 10 reps        Time-based " treatments/modalities:    Physical Therapy Timed Treatment Charges  Therapeutic exercise minutes (CPT 94847): 38 minutes      ASSESSMENT:   Response to treatment: The pt reported muscular soreness but no shoulder pain with progression of therex this session. His L shoulder strength and ROM continues to improve with PT intervention. The pt is continuing to experience significant R thumb discomfort, even despite bracing. He is making good progress towards PT goals regarding his shoulder and will benefit from ongoing skilled therapy to maximize return to active PLOF.     PLAN/RECOMMENDATIONS:   Plan for treatment: therapy treatment to continue next visit.  Planned interventions for next visit: continue with current treatment. Continue to progress shoulder mobility and strength as tolerated.

## 2021-11-09 ENCOUNTER — PHYSICAL THERAPY (OUTPATIENT)
Dept: PHYSICAL THERAPY | Facility: REHABILITATION | Age: 31
End: 2021-11-09
Attending: ORTHOPAEDIC SURGERY
Payer: MEDICAID

## 2021-11-09 DIAGNOSIS — S43.102A UNSPECIFIED DISLOCATION OF LEFT ACROMIOCLAVICULAR JOINT, INITIAL ENCOUNTER: ICD-10-CM

## 2021-11-09 PROCEDURE — 97110 THERAPEUTIC EXERCISES: CPT

## 2021-11-09 NOTE — OP THERAPY DAILY TREATMENT
Outpatient Physical Therapy  DAILY TREATMENT     [facility] Physical [address]  [address]  [place] [address]  Phone:  [phone]  Fax:  [phone]    Date: 11/09/2021    Patient: Eddie Daniel  YOB: 1990  MRN: [id number]     Time Calculation    Start time: 1503  Stop time: 1544 Time Calculation (min): 41 minutes         Chief Complaint: Hand Injury and Shoulder Injury    Visit #: 5    SUBJECTIVE:  Follow up with surgeon tomorrow for return to work (needs to lift 50-75#). Feels that shoulder is slowly but surely improving. Right thumb is progressively worse even with consistent use of brace. Right hamstring has recovered on its own.     OBJECTIVE:  Current objective measures:           Therapeutic Exercises (CPT 56560):     1. UBE , L3, 6 minutes    2. Pulleys, 2x10    3. T-band shoulder extension , 2 x 15 , Black t-band     4. T-band rows, 2 x 15 , Black t-band    5. T-band ER, 2 x 15 , Black t-band    6. T-band IR, 2 x 15 , Black t-band    7. Half foam roller - alt GH flexion , 2 x 15, 2# DB on L     8. Half foam roller - serratus punch, 2 x 15, 2# DB on L     9. Half foam roller -flys , 2 x 15, 2# DB on L     10. Half foam roller - chest press , 2 x 15, 2# DB on L     11. T-band oscillations, 2 x 30 seconds , Teal TB     12. Body blade at side -> 90 degree elbow flexion -> 90 degree shoulder flexion, 2 x 30 seconds in each position , Yellow body blade     13. Shoulder ROM assessment       Therapeutic Exercise Summary: Pt educated to continue with HEP.     Access Code: [email or web address]  URL: [email or web address]  Date: 10/18/2021  Prepared by: Shirley Whaley    Exercises  Shoulder External Rotation with Anchored Resistance - 1 x daily - 7 x weekly - 3 sets - 10 reps  Shoulder Internal Rotation with Resistance - 1 x daily - 7 x weekly - 3 sets - 10 reps  Supine Shoulder Flexion Extension AAROM with Dowel - 1 x daily - 7 x weekly - 3 sets - 10 reps  Supine Shoulder  Abduction AAROM with Dowel - 1 x daily - 7 x weekly - 3 sets - 10 reps  Standing Shoulder Row with Anchored Resistance - 1 x daily - 7 x weekly - 3 sets - 10 reps        Time-based treatments/modalities:    Physical Therapy Timed Treatment Charges  Therapeutic exercise minutes (CPT 91732): 41 minutes      ASSESSMENT:   Response to treatment: Patient tolerated all strengthening exercises without onset of pain at the L AC joint. Recommended following up on precautions with surgeon to see if PT can progress to weightbearing positions.     PLAN/RECOMMENDATIONS:   Plan for treatment: therapy treatment to continue next visit.  Planned interventions for next visit: continue with current treatment.

## 2021-11-12 ENCOUNTER — PHYSICAL THERAPY (OUTPATIENT)
Dept: PHYSICAL THERAPY | Facility: REHABILITATION | Age: 31
End: 2021-11-12
Attending: ORTHOPAEDIC SURGERY
Payer: MEDICAID

## 2021-11-12 DIAGNOSIS — S43.102A UNSPECIFIED DISLOCATION OF LEFT ACROMIOCLAVICULAR JOINT, INITIAL ENCOUNTER: ICD-10-CM

## 2021-11-12 PROCEDURE — 97110 THERAPEUTIC EXERCISES: CPT

## 2021-11-12 NOTE — OP THERAPY DAILY TREATMENT
"  Outpatient Physical Therapy  DAILY TREATMENT     [facility] Physical [address].  [address]  [place] [address]  Phone:  [phone]  Fax:  [phone]    Date: 11/12/2021    Patient: Eddie Daniel  YOB: 1990  MRN: [id number]     Time Calculation    Start time: 1400  Stop time: 1429 Time Calculation (min): 29 minutes         Chief Complaint: Shoulder Problem and Post-Op Pain    Visit #: 6    SUBJECTIVE:  The pt states that his follow up with the surgeon went well, he was pleased with his L shoulder progress and is cleared for light duty at work. He is going to be getting an MRI on the R thumb in early December. The pt reports that his L shoulder pain and function are improving with his greatest limitation being the R thumb. The pt is agreeable to PT today.     OBJECTIVE:          Therapeutic Exercises (CPT 83795):     1. UBE , L4, 6 minutes    2. Cable rows, 2x10 20#    3. Cable pulldowns, 2x10 15#    4. Cable IR, 2x10 5#    5. Cable ER, 2x10 2.5#    6. Cable tricep ext, 2x10 15#    7. Dumbell overhead press, 2x10 3#    8. Half foam roller - serratus punch, 2 x 15, 3# DB on L     9. Half foam roller -flys , 2 x 15, 3# DB on L     10. Half foam roller - chest press , 2 x 15, 3# DB on L     11. T-band oscillations, Not today, Teal TB     12. Body blade at side -> 90 degree elbow flexion -> 90 degree shoulder flexion, 2x30\", Yellow body blade     13. Plinth plank, Attempted, pain in wrist    14. Wall flexion to abduction AAROM, 10x each     20. POC: 10/18/2021 to  12/13/21      Therapeutic Exercise Summary: Pt educated to continue with HEP.     Access Code: [email or web address]  URL: [email or web address]  Date: 10/18/2021  Prepared by: Shirley Whaley    Exercises  Shoulder External Rotation with Anchored Resistance - 1 x daily - 7 x weekly - 3 sets - 10 reps  Shoulder Internal Rotation with Resistance - 1 x daily - 7 x weekly - 3 sets - 10 reps  Supine Shoulder Flexion " Extension AAROM with Dowel - 1 x daily - 7 x weekly - 3 sets - 10 reps  Supine Shoulder Abduction AAROM with Dowel - 1 x daily - 7 x weekly - 3 sets - 10 reps  Standing Shoulder Row with Anchored Resistance - 1 x daily - 7 x weekly - 3 sets - 10 reps        Time-based treatments/modalities:    Physical Therapy Timed Treatment Charges  Therapeutic exercise minutes (CPT 14172): 26 minutes    ASSESSMENT:   Response to treatment: The pt tolerated a progression in therex for shoulder strengthening and stabilization without an increase in shoulder discomfort. He did fatigue with strengthening activities, at a level appropriate for his post-operative status. He is limited in progression of double arm activities by his R thumb pain. He will continue to benefit from progression of therex for shoulder strengthening and stabilization to maximize his return to PLOF.     PLAN/RECOMMENDATIONS:   Plan for treatment: therapy treatment to continue next visit.  Planned interventions for next visit: continue with current treatment. Continue with progression of shoulder strengthening and stabilization as tolerated.

## 2021-11-16 ENCOUNTER — PHYSICAL THERAPY (OUTPATIENT)
Dept: PHYSICAL THERAPY | Facility: REHABILITATION | Age: 31
End: 2021-11-16
Attending: ORTHOPAEDIC SURGERY
Payer: MEDICAID

## 2021-11-16 DIAGNOSIS — S43.102A UNSPECIFIED DISLOCATION OF LEFT ACROMIOCLAVICULAR JOINT, INITIAL ENCOUNTER: ICD-10-CM

## 2021-11-16 PROCEDURE — 97110 THERAPEUTIC EXERCISES: CPT

## 2021-11-16 NOTE — OP THERAPY DAILY TREATMENT
"  Outpatient Physical Therapy  DAILY TREATMENT     [facility] Physical [address].  [address]  [place] [address]  Phone:  [phone]  Fax:  [phone]    Date: 11/16/2021    Patient: Eddie Daniel  YOB: 1990  MRN: [id number]     Time Calculation    Start time: 1400  Stop time: 1438 Time Calculation (min): 38 minutes         Chief Complaint: Shoulder Problem and Post-Op Pain    Visit #: 7    SUBJECTIVE:  The pt states that he returned to work yesterday and that he is doing work primarily on the [employer]. He states that his shoulder is sore, but his R thumb continues to be very uncomfortable. He is still wearing thumb splint on the R. He denies any increased soreness or discomfort following exercise progression last PT visit. He is agreeable to PT.     OBJECTIVE:          Therapeutic Exercises (CPT 97590):     1. Pulleys warm up, 2 min ea, flex/abd    2. Cable rows, 2x10 25#    3. Cable pulldowns, 2x10 20#    4. Cable IR, 2x10, 5#, Towel under elbow    5. Cable ER, 2x10, 5#, Towel under elbow    6. Cable tricep , 2x10 20#    7. Dumbell overhead press, 2x15, 4# dumbbell    8. Half foam roller - serratus punch, 2 x 15, 4# DB on L     9. Half foam roller -flys , 2 x 15, 4#, DB on L     10. Half foam roller - chest press , 2 x 15, 4# DB on L     11. T-band oscillations, 2x30\", Purple TB     12. Body blade ER/IR, flexion at 90, abduction at 90, 30\" each, Yellow body blade     13. UBE, L3, 6 minutes, switching anterior to posterior every minute    14. Cable bicep, 2x10, 20#    15. Half foam alternating flexion , 10x each     16. Half foam goal posts, 15x    17. Half foam snow angels, 15x    20. POC: 10/18/2021 to  12/13/21      Therapeutic Exercise Summary: Pt educated to continue with HEP. Pt educated to bring list of home equipment for next session to adjust HEP accordingly.     Access Code: [email or web address]  URL: [email or web address]  Date: 10/18/2021  Prepared by: Shirley" Judd    Exercises  Shoulder External Rotation with Anchored Resistance - 1 x daily - 7 x weekly - 3 sets - 10 reps  Shoulder Internal Rotation with Resistance - 1 x daily - 7 x weekly - 3 sets - 10 reps  Supine Shoulder Flexion Extension AAROM with Dowel - 1 x daily - 7 x weekly - 3 sets - 10 reps  Supine Shoulder Abduction AAROM with Dowel - 1 x daily - 7 x weekly - 3 sets - 10 reps  Standing Shoulder Row with Anchored Resistance - 1 x daily - 7 x weekly - 3 sets - 10 reps        Time-based treatments/modalities:    Physical Therapy Timed Treatment Charges  Therapeutic exercise minutes (CPT 11399): 38 minutes    ASSESSMENT:   Response to treatment: The pt continues to tolerate a progression in therex for shoulder strengthening and stabilization with appropriate fatigue but no significant pain. He is limited in his progression to weight bearing secondary to persistent R thumb symptoms. He is making overall good progress towards PT goals with regards to his L shoulder.     PLAN/RECOMMENDATIONS:   Plan for treatment: therapy treatment to continue next visit.  Planned interventions for next visit: continue with current treatment. Continue to progress L shoulder stability and ROM as tolerated.

## 2021-11-19 ENCOUNTER — APPOINTMENT (OUTPATIENT)
Dept: PHYSICAL THERAPY | Facility: REHABILITATION | Age: 31
End: 2021-11-19
Attending: ORTHOPAEDIC SURGERY
Payer: MEDICAID

## 2021-11-23 ENCOUNTER — PHYSICAL THERAPY (OUTPATIENT)
Dept: PHYSICAL THERAPY | Facility: REHABILITATION | Age: 31
End: 2021-11-23
Attending: ORTHOPAEDIC SURGERY
Payer: MEDICAID

## 2021-11-23 DIAGNOSIS — S43.102A UNSPECIFIED DISLOCATION OF LEFT ACROMIOCLAVICULAR JOINT, INITIAL ENCOUNTER: ICD-10-CM

## 2021-11-23 PROCEDURE — 97110 THERAPEUTIC EXERCISES: CPT

## 2021-11-23 NOTE — OP THERAPY DAILY TREATMENT
"  Outpatient Physical Therapy  DAILY TREATMENT     [address]  [address]  [place] [address]  Phone:  [phone]  Fax:  [phone]    Date: 11/23/2021    Patient: Eddie Daniel  YOB: 1990  MRN: [id number]     Time Calculation    Start time: 1400  Stop time: 1438 Time Calculation (min): 38 minutes         Chief Complaint: Shoulder Problem    Visit #: 8    SUBJECTIVE:  The pt states that his shoulder has been sore and tired from his required work duties. He states that his R thumb continues to be painful, MRI is scheduled for 12/2/21. The pt reports that he has a set of 10# and 25# dumbbells at home. He is agreeable to PT.     OBJECTIVE:        Therapeutic Exercises (CPT 36348):     1. UBE, L3, 4 minutes, switching anterior to posterior every minute    2. Pulleys warm up, 2 min ea, flex/abd    3. Bent over row, 2x15, 10# dumbbell    4. Cable IR, 2x15, 5#, Towel under elbow    5. Cable ER, 2x15, 5#, Towel under elbow    6. Bent over tricep kickback, 2x15, 5# dumbbell    7. Dumbell overhead press, 3x10, 5# dumbbell    8. Half foam roller - serratus punch, 2 x 15, 5# DB on L     9. Half foam roller -flys , 2 x 15, 5#, DB on L     10. Half foam roller - chest press , 2 x 15, 5# DB on L     11. T-band oscillations, 2x30\", Purple TB     12. Body blade ER/IR, flexion at 90, abduction at 90, Not today, Yellow body blade     13. Dumbbell bicep curl, 2x15, 5# dumbbell    14. Half foam snow angels, 15x    15. Half foam alternating flexion , 10x each     16. Half foam goal posts, 15x    20. POC: 10/18/2021 to  12/13/21      Therapeutic Exercise Summary: Pt educated to continue with HEP updated below      Access Code: [email or web address]  URL: [email or web address]  Date: 11/23/2021  Prepared by: Shirley Whaley    Exercises  Shoulder External Rotation with Anchored Resistance - 1 x daily - 7 x weekly - 3 sets - 10 reps  Shoulder Internal Rotation with Resistance - 1 x " daily - 7 x weekly - 3 sets - 10 reps  Standing Bent Over Single Arm Scapular Row with Table Support - 1 x daily - 7 x weekly - 3 sets - 10 reps  Standing Bent Over Triceps Extension - 1 x daily - 7 x weekly - 3 sets - 10 reps  Supine Chest Press with Dumbbells - 1 x daily - 7 x weekly - 3 sets - 10 reps  Supine Scapular Protraction in Flexion with Dumbbells - 1 x daily - 7 x weekly - 3 sets - 10 reps        Time-based treatments/modalities:    Physical Therapy Timed Treatment Charges  Therapeutic exercise minutes (CPT 88264): 38 minutes  ASSESSMENT:   Response to treatment: The pt continues to tolerate a progressive increase in therex for shoulder strengthening and stabilization without an increase in symptoms. Today's session focused on reviewing pt's home program with his home equipment to ensure successful transition to discharge next visit. The pt is making good progress towards PT goals.     PLAN/RECOMMENDATIONS:   Plan for treatment: therapy treatment to continue next visit.  Planned interventions for next visit: continue with current treatment. Anticipate transition to independent [facility] next visit.

## 2021-11-29 ENCOUNTER — APPOINTMENT (OUTPATIENT)
Dept: PHYSICAL THERAPY | Facility: REHABILITATION | Age: 31
End: 2021-11-29
Attending: ORTHOPAEDIC SURGERY
Payer: MEDICAID

## 2021-12-02 ENCOUNTER — APPOINTMENT (OUTPATIENT)
Dept: PHYSICAL THERAPY | Facility: REHABILITATION | Age: 31
End: 2021-12-02
Attending: ORTHOPAEDIC SURGERY
Payer: MEDICAID

## 2021-12-16 ENCOUNTER — PHYSICAL THERAPY (OUTPATIENT)
Dept: PHYSICAL THERAPY | Facility: REHABILITATION | Age: 31
End: 2021-12-16
Attending: ORTHOPAEDIC SURGERY
Payer: MEDICAID

## 2021-12-16 DIAGNOSIS — S43.102A UNSPECIFIED DISLOCATION OF LEFT ACROMIOCLAVICULAR JOINT, INITIAL ENCOUNTER: ICD-10-CM

## 2021-12-16 PROCEDURE — 97110 THERAPEUTIC EXERCISES: CPT

## 2021-12-16 NOTE — OP THERAPY DAILY TREATMENT
Outpatient Physical Therapy  DAILY TREATMENT     [facility] Physical [address]  [address]  [place] [address]  Phone:  [phone]  Fax:  [phone]    Date: 12/16/2021    Patient: Eddie Daniel  YOB: 1990  MRN: [id number]     Time Calculation    Start time: 1603  Stop time: 1612 Time Calculation (min): 9 minutes         Chief Complaint: Shoulder Problem    Visit #: 9    SUBJECTIVE:  The pt states that his shoulder has been feeling good overall and he got clearance from his surgeon to return to work. He states that sleeping has improved, as long as he finds a comfortable position. He states that he has been able to play with his kids without complication. He is going to be getting occupational therapy for his R thumb/wrist concerns. He is confident in transition to discharge from therapy after today.     OBJECTIVE:  Current objective measures: See discharge summary          Therapeutic Exercises (CPT 08612):     1. UBE, L3, 4 minutes, switching anterior to posterior every minute    2. Dumbbell bicep curl, HEP    3. Bent over row, HEP    4. Cable IR, HEP, Towel under elbow    5. Cable ER, HEP, Towel under elbow    6. Bent over tricep kickback, HEP    7. Dumbell overhead press, HEP    8. Half foam roller - serratus punch, HEP    9. Half foam roller -flys , HEP    10. Chest press, HEP    20. POC: 10/18/2021 to  12/31/21      Therapeutic Exercise Summary: Pt educated to continue with HEP updated below      Access Code: [email or web address]  URL: [email or web address]  Date: 11/23/2021  Prepared by: Shirley Whaley    Exercises  Shoulder External Rotation with Anchored Resistance - 1 x daily - 7 x weekly - 3 sets - 10 reps  Shoulder Internal Rotation with Resistance - 1 x daily - 7 x weekly - 3 sets - 10 reps  Standing Bent Over Single Arm Scapular Row with Table Support - 1 x daily - 7 x weekly - 3 sets - 10 reps  Standing Bent Over Triceps Extension - 1 x daily - 7 x weekly -  3 sets - 10 reps  Supine Chest Press with Dumbbells - 1 x daily - 7 x weekly - 3 sets - 10 reps  Supine Scapular Protraction in Flexion with Dumbbells - 1 x daily - 7 x weekly - 3 sets - 10 reps        Time-based treatments/modalities:    Physical Therapy Timed Treatment Charges  Therapeutic exercise minutes (CPT 19297): 8 minutes  ASSESSMENT:   Response to treatment: See discharge summary    PLAN/RECOMMENDATIONS:   Plan for treatment: discharge patient due to accomplished goals.  Planned interventions for next visit: Pt to transition to independent HEP.

## 2021-12-17 NOTE — OP THERAPY DISCHARGE SUMMARY
Outpatient Physical Therapy  DISCHARGE SUMMARY NOTE      [address]  [address]  [facility] [place] [address]  Phone:  [phone]  Fax:  [phone]    Date of Visit: 12/16/2021    Patient: Eddie Daniel  YOB: 1990  MRN: [id number]     Referring Provider: Al Isabel M.D.  [address]  [address]  [place] [address]   Referring Diagnosis Unspecified dislocation of left acromioclavicular joint, initial encounter [S43.102A]         Functional Assessment Used  Quickdash General Total Score: 4.55     Your patient is being discharged from Physical Therapy with the following comments:   · Goals met    Comments:  Eddie Daniel was seen for 9 PT visits between 10/18/21-12/16/21 s/p L AC joint repair. The pt has made overall improvements in shoulder ROM, shoulder strength, sleeping tolerance, driving tolerance, ability to work, and ability to participate in desired functional tasks. The pt has met 100% of his PT goals and expressed good adherence and self-efficacy with HEP. Based on the pt's achievement of PT goals and independence with HEP, he will be discharged from skilled therapy at this time.     Limitations Remaining:  Short Term Goals:   1. Pt will demonstrate L shoulder PROM to WNL in all planes (MET)  2. Pt will demonstrate L shoulder ER MMT to 5/5 (MET)  3. Pt will report 50% improvement in his sleeping tolerance (MET)  4. Pt will be able to drive for 30 minutes without an increase in shoulder symptoms (MET)      Long Term Goals:    1. Pt will be able to perform all required duties to return to work  (MET)  2. Pt will be able to sleep throughout the night without being awoken by L shoulder pain (MET)  3. Pt will be able to play with his kids for 1 hour without an increase in shoulder pain (MET)  4. Pt will be independent with HEP (MET)    Recommendations:  Recommend that the pt continue with participation in independent HEP to maintain  functional gains made during PT intervention. If symptoms worsen, recommend that pt request a new PT referral.     Shirley Whaley, PT    Date: 12/16/2021

## 2022-05-25 ENCOUNTER — HOSPITAL ENCOUNTER (OUTPATIENT)
Facility: MEDICAL CENTER | Age: 32
End: 2022-05-26
Attending: EMERGENCY MEDICINE | Admitting: STUDENT IN AN ORGANIZED HEALTH CARE EDUCATION/TRAINING PROGRAM
Payer: MEDICAID

## 2022-05-25 DIAGNOSIS — K92.1 MELENA: ICD-10-CM

## 2022-05-25 DIAGNOSIS — K92.2 GASTROINTESTINAL HEMORRHAGE, UNSPECIFIED GASTROINTESTINAL HEMORRHAGE TYPE: ICD-10-CM

## 2022-05-25 DIAGNOSIS — K62.5 RECTAL BLEEDING: ICD-10-CM

## 2022-05-25 LAB
ABO GROUP BLD: NORMAL
ALBUMIN SERPL BCP-MCNC: 4.4 G/DL (ref 3.2–4.9)
ALBUMIN/GLOB SERPL: 1.6 G/DL
ALP SERPL-CCNC: 78 U/L (ref 30–99)
ALT SERPL-CCNC: 30 U/L (ref 2–50)
ANION GAP SERPL CALC-SCNC: 13 MMOL/L (ref 7–16)
APTT PPP: 29.6 SEC (ref 24.7–36)
AST SERPL-CCNC: 27 U/L (ref 12–45)
BASOPHILS # BLD AUTO: 0.5 % (ref 0–1.8)
BASOPHILS # BLD: 0.04 K/UL (ref 0–0.12)
BILIRUB SERPL-MCNC: 0.5 MG/DL (ref 0.1–1.5)
BLD GP AB SCN SERPL QL: NORMAL
BUN SERPL-MCNC: 15 MG/DL (ref 8–22)
CALCIUM SERPL-MCNC: 9.1 MG/DL (ref 8.5–10.5)
CHLORIDE SERPL-SCNC: 108 MMOL/L (ref 96–112)
CO2 SERPL-SCNC: 20 MMOL/L (ref 20–33)
CREAT SERPL-MCNC: 1.02 MG/DL (ref 0.5–1.4)
EOSINOPHIL # BLD AUTO: 0.12 K/UL (ref 0–0.51)
EOSINOPHIL NFR BLD: 1.5 % (ref 0–6.9)
ERYTHROCYTE [DISTWIDTH] IN BLOOD BY AUTOMATED COUNT: 40.5 FL (ref 35.9–50)
GFR SERPLBLD CREATININE-BSD FMLA CKD-EPI: 99 ML/MIN/1.73 M 2
GLOBULIN SER CALC-MCNC: 2.8 G/DL (ref 1.9–3.5)
GLUCOSE SERPL-MCNC: 97 MG/DL (ref 65–99)
HCT VFR BLD AUTO: 43.4 % (ref 42–52)
HGB BLD-MCNC: 15.4 G/DL (ref 14–18)
IMM GRANULOCYTES # BLD AUTO: 0.03 K/UL (ref 0–0.11)
IMM GRANULOCYTES NFR BLD AUTO: 0.4 % (ref 0–0.9)
INR PPP: 1 (ref 0.87–1.13)
LIPASE SERPL-CCNC: 62 U/L (ref 11–82)
LYMPHOCYTES # BLD AUTO: 2.08 K/UL (ref 1–4.8)
LYMPHOCYTES NFR BLD: 25.3 % (ref 22–41)
MCH RBC QN AUTO: 30.4 PG (ref 27–33)
MCHC RBC AUTO-ENTMCNC: 35.5 G/DL (ref 33.7–35.3)
MCV RBC AUTO: 85.6 FL (ref 81.4–97.8)
MONOCYTES # BLD AUTO: 0.63 K/UL (ref 0–0.85)
MONOCYTES NFR BLD AUTO: 7.7 % (ref 0–13.4)
NEUTROPHILS # BLD AUTO: 5.32 K/UL (ref 1.82–7.42)
NEUTROPHILS NFR BLD: 64.6 % (ref 44–72)
NRBC # BLD AUTO: 0 K/UL
NRBC BLD-RTO: 0 /100 WBC
PLATELET # BLD AUTO: 280 K/UL (ref 164–446)
PMV BLD AUTO: 8.9 FL (ref 9–12.9)
POTASSIUM SERPL-SCNC: 3.9 MMOL/L (ref 3.6–5.5)
PROT SERPL-MCNC: 7.2 G/DL (ref 6–8.2)
PROTHROMBIN TIME: 12.9 SEC (ref 12–14.6)
RBC # BLD AUTO: 5.07 M/UL (ref 4.7–6.1)
RH BLD: NORMAL
SODIUM SERPL-SCNC: 141 MMOL/L (ref 135–145)
WBC # BLD AUTO: 8.2 K/UL (ref 4.8–10.8)

## 2022-05-25 PROCEDURE — 36415 COLL VENOUS BLD VENIPUNCTURE: CPT

## 2022-05-25 PROCEDURE — 86850 RBC ANTIBODY SCREEN: CPT

## 2022-05-25 PROCEDURE — 85730 THROMBOPLASTIN TIME PARTIAL: CPT

## 2022-05-25 PROCEDURE — 85610 PROTHROMBIN TIME: CPT

## 2022-05-25 PROCEDURE — 83550 IRON BINDING TEST: CPT

## 2022-05-25 PROCEDURE — 85046 RETICYTE/HGB CONCENTRATE: CPT

## 2022-05-25 PROCEDURE — 83010 ASSAY OF HAPTOGLOBIN QUANT: CPT

## 2022-05-25 PROCEDURE — 82728 ASSAY OF FERRITIN: CPT

## 2022-05-25 PROCEDURE — 83690 ASSAY OF LIPASE: CPT

## 2022-05-25 PROCEDURE — 83615 LACTATE (LD) (LDH) ENZYME: CPT

## 2022-05-25 PROCEDURE — 99285 EMERGENCY DEPT VISIT HI MDM: CPT

## 2022-05-25 PROCEDURE — 85025 COMPLETE CBC W/AUTO DIFF WBC: CPT

## 2022-05-25 PROCEDURE — 84466 ASSAY OF TRANSFERRIN: CPT

## 2022-05-25 PROCEDURE — 82607 VITAMIN B-12: CPT

## 2022-05-25 PROCEDURE — 96374 THER/PROPH/DIAG INJ IV PUSH: CPT

## 2022-05-25 PROCEDURE — 86900 BLOOD TYPING SEROLOGIC ABO: CPT

## 2022-05-25 PROCEDURE — 83540 ASSAY OF IRON: CPT

## 2022-05-25 PROCEDURE — 86901 BLOOD TYPING SEROLOGIC RH(D): CPT

## 2022-05-25 PROCEDURE — 82746 ASSAY OF FOLIC ACID SERUM: CPT

## 2022-05-25 PROCEDURE — 80053 COMPREHEN METABOLIC PANEL: CPT

## 2022-05-25 ASSESSMENT — FIBROSIS 4 INDEX: FIB4 SCORE: 0.89

## 2022-05-26 ENCOUNTER — APPOINTMENT (OUTPATIENT)
Dept: RADIOLOGY | Facility: MEDICAL CENTER | Age: 32
End: 2022-05-26
Attending: STUDENT IN AN ORGANIZED HEALTH CARE EDUCATION/TRAINING PROGRAM
Payer: MEDICAID

## 2022-05-26 ENCOUNTER — APPOINTMENT (OUTPATIENT)
Dept: RADIOLOGY | Facility: MEDICAL CENTER | Age: 32
End: 2022-05-26
Attending: EMERGENCY MEDICINE
Payer: MEDICAID

## 2022-05-26 VITALS
TEMPERATURE: 97.7 F | OXYGEN SATURATION: 94 % | RESPIRATION RATE: 16 BRPM | HEIGHT: 69 IN | WEIGHT: 199.08 LBS | HEART RATE: 59 BPM | BODY MASS INDEX: 29.49 KG/M2 | SYSTOLIC BLOOD PRESSURE: 103 MMHG | DIASTOLIC BLOOD PRESSURE: 62 MMHG

## 2022-05-26 PROBLEM — D62 ACUTE BLOOD LOSS ANEMIA: Status: ACTIVE | Noted: 2022-05-26

## 2022-05-26 PROBLEM — F10.90 ALCOHOL USE: Status: ACTIVE | Noted: 2022-05-26

## 2022-05-26 PROBLEM — Z78.9 ALCOHOL USE: Status: ACTIVE | Noted: 2022-05-26

## 2022-05-26 PROBLEM — K62.5 RECTAL BLEED: Status: ACTIVE | Noted: 2022-05-26

## 2022-05-26 LAB
FERRITIN SERPL-MCNC: 21.3 NG/ML (ref 22–322)
FOLATE SERPL-MCNC: 17.2 NG/ML
HCT VFR BLD AUTO: 41 % (ref 42–52)
HGB BLD-MCNC: 14.5 G/DL (ref 14–18)
HGB RETIC QN AUTO: 34.6 PG/CELL (ref 29–35)
IMM RETICS NFR: 14.2 % (ref 9.3–17.4)
IRON SATN MFR SERPL: 13 % (ref 15–55)
IRON SERPL-MCNC: 63 UG/DL (ref 50–180)
LDH SERPL L TO P-CCNC: 239 U/L (ref 107–266)
RETICS # AUTO: 0.14 M/UL (ref 0.04–0.06)
RETICS/RBC NFR: 2.7 % (ref 0.8–2.1)
TIBC SERPL-MCNC: 468 UG/DL (ref 250–450)
TRANSFERRIN SERPL-MCNC: 390 MG/DL (ref 200–370)
UIBC SERPL-MCNC: 405 UG/DL (ref 110–370)
VIT B12 SERPL-MCNC: 390 PG/ML (ref 211–911)

## 2022-05-26 PROCEDURE — 96374 THER/PROPH/DIAG INJ IV PUSH: CPT

## 2022-05-26 PROCEDURE — 85018 HEMOGLOBIN: CPT

## 2022-05-26 PROCEDURE — 74176 CT ABD & PELVIS W/O CONTRAST: CPT

## 2022-05-26 PROCEDURE — 700111 HCHG RX REV CODE 636 W/ 250 OVERRIDE (IP): Performed by: EMERGENCY MEDICINE

## 2022-05-26 PROCEDURE — 700102 HCHG RX REV CODE 250 W/ 637 OVERRIDE(OP): Performed by: STUDENT IN AN ORGANIZED HEALTH CARE EDUCATION/TRAINING PROGRAM

## 2022-05-26 PROCEDURE — C9113 INJ PANTOPRAZOLE SODIUM, VIA: HCPCS | Performed by: EMERGENCY MEDICINE

## 2022-05-26 PROCEDURE — 700111 HCHG RX REV CODE 636 W/ 250 OVERRIDE (IP): Performed by: STUDENT IN AN ORGANIZED HEALTH CARE EDUCATION/TRAINING PROGRAM

## 2022-05-26 PROCEDURE — G0378 HOSPITAL OBSERVATION PER HR: HCPCS

## 2022-05-26 PROCEDURE — 99236 HOSP IP/OBS SAME DATE HI 85: CPT | Performed by: STUDENT IN AN ORGANIZED HEALTH CARE EDUCATION/TRAINING PROGRAM

## 2022-05-26 PROCEDURE — C9113 INJ PANTOPRAZOLE SODIUM, VIA: HCPCS | Performed by: STUDENT IN AN ORGANIZED HEALTH CARE EDUCATION/TRAINING PROGRAM

## 2022-05-26 PROCEDURE — 700105 HCHG RX REV CODE 258: Performed by: STUDENT IN AN ORGANIZED HEALTH CARE EDUCATION/TRAINING PROGRAM

## 2022-05-26 PROCEDURE — 36415 COLL VENOUS BLD VENIPUNCTURE: CPT

## 2022-05-26 PROCEDURE — 700105 HCHG RX REV CODE 258: Performed by: EMERGENCY MEDICINE

## 2022-05-26 PROCEDURE — A9270 NON-COVERED ITEM OR SERVICE: HCPCS | Performed by: STUDENT IN AN ORGANIZED HEALTH CARE EDUCATION/TRAINING PROGRAM

## 2022-05-26 PROCEDURE — 85014 HEMATOCRIT: CPT

## 2022-05-26 PROCEDURE — 71045 X-RAY EXAM CHEST 1 VIEW: CPT

## 2022-05-26 PROCEDURE — 96376 TX/PRO/DX INJ SAME DRUG ADON: CPT

## 2022-05-26 RX ORDER — ONDANSETRON 4 MG/1
4 TABLET, ORALLY DISINTEGRATING ORAL EVERY 4 HOURS PRN
Status: DISCONTINUED | OUTPATIENT
Start: 2022-05-26 | End: 2022-05-26 | Stop reason: HOSPADM

## 2022-05-26 RX ORDER — AMOXICILLIN 250 MG
2 CAPSULE ORAL 2 TIMES DAILY
Status: DISCONTINUED | OUTPATIENT
Start: 2022-05-26 | End: 2022-05-26 | Stop reason: HOSPADM

## 2022-05-26 RX ORDER — PANTOPRAZOLE SODIUM 40 MG/10ML
40 INJECTION, POWDER, LYOPHILIZED, FOR SOLUTION INTRAVENOUS 2 TIMES DAILY
Status: DISCONTINUED | OUTPATIENT
Start: 2022-05-26 | End: 2022-05-26 | Stop reason: HOSPADM

## 2022-05-26 RX ORDER — ONDANSETRON 2 MG/ML
4 INJECTION INTRAMUSCULAR; INTRAVENOUS EVERY 4 HOURS PRN
Status: DISCONTINUED | OUTPATIENT
Start: 2022-05-26 | End: 2022-05-26 | Stop reason: HOSPADM

## 2022-05-26 RX ORDER — PROCHLORPERAZINE EDISYLATE 5 MG/ML
5-10 INJECTION INTRAMUSCULAR; INTRAVENOUS EVERY 4 HOURS PRN
Status: DISCONTINUED | OUTPATIENT
Start: 2022-05-26 | End: 2022-05-26 | Stop reason: HOSPADM

## 2022-05-26 RX ORDER — TRAMADOL HYDROCHLORIDE 50 MG/1
50 TABLET ORAL EVERY 6 HOURS PRN
Status: DISCONTINUED | OUTPATIENT
Start: 2022-05-26 | End: 2022-05-26

## 2022-05-26 RX ORDER — HYDRALAZINE HYDROCHLORIDE 20 MG/ML
10 INJECTION INTRAMUSCULAR; INTRAVENOUS EVERY 4 HOURS PRN
Status: DISCONTINUED | OUTPATIENT
Start: 2022-05-26 | End: 2022-05-26 | Stop reason: HOSPADM

## 2022-05-26 RX ORDER — OXYCODONE HYDROCHLORIDE 5 MG/1
2.5 TABLET ORAL
Status: DISCONTINUED | OUTPATIENT
Start: 2022-05-26 | End: 2022-05-26 | Stop reason: HOSPADM

## 2022-05-26 RX ORDER — SODIUM CHLORIDE, SODIUM LACTATE, POTASSIUM CHLORIDE, CALCIUM CHLORIDE 600; 310; 30; 20 MG/100ML; MG/100ML; MG/100ML; MG/100ML
INJECTION, SOLUTION INTRAVENOUS CONTINUOUS
Status: ACTIVE | OUTPATIENT
Start: 2022-05-26 | End: 2022-05-26

## 2022-05-26 RX ORDER — OXYCODONE HYDROCHLORIDE 5 MG/1
5 TABLET ORAL
Status: DISCONTINUED | OUTPATIENT
Start: 2022-05-26 | End: 2022-05-26 | Stop reason: HOSPADM

## 2022-05-26 RX ORDER — PROMETHAZINE HYDROCHLORIDE 25 MG/1
12.5-25 SUPPOSITORY RECTAL EVERY 4 HOURS PRN
Status: DISCONTINUED | OUTPATIENT
Start: 2022-05-26 | End: 2022-05-26 | Stop reason: HOSPADM

## 2022-05-26 RX ORDER — HYDROMORPHONE HYDROCHLORIDE 1 MG/ML
0.25 INJECTION, SOLUTION INTRAMUSCULAR; INTRAVENOUS; SUBCUTANEOUS
Status: DISCONTINUED | OUTPATIENT
Start: 2022-05-26 | End: 2022-05-26 | Stop reason: HOSPADM

## 2022-05-26 RX ORDER — FOLIC ACID 1 MG/1
1 TABLET ORAL DAILY
Status: DISCONTINUED | OUTPATIENT
Start: 2022-05-26 | End: 2022-05-26 | Stop reason: HOSPADM

## 2022-05-26 RX ORDER — PROMETHAZINE HYDROCHLORIDE 25 MG/1
12.5-25 TABLET ORAL EVERY 4 HOURS PRN
Status: DISCONTINUED | OUTPATIENT
Start: 2022-05-26 | End: 2022-05-26 | Stop reason: HOSPADM

## 2022-05-26 RX ORDER — POLYETHYLENE GLYCOL 3350 17 G/17G
1 POWDER, FOR SOLUTION ORAL
Status: DISCONTINUED | OUTPATIENT
Start: 2022-05-26 | End: 2022-05-26 | Stop reason: HOSPADM

## 2022-05-26 RX ORDER — ACETAMINOPHEN 325 MG/1
650 TABLET ORAL EVERY 6 HOURS PRN
Status: DISCONTINUED | OUTPATIENT
Start: 2022-05-26 | End: 2022-05-26 | Stop reason: HOSPADM

## 2022-05-26 RX ORDER — BISACODYL 10 MG
10 SUPPOSITORY, RECTAL RECTAL
Status: DISCONTINUED | OUTPATIENT
Start: 2022-05-26 | End: 2022-05-26 | Stop reason: HOSPADM

## 2022-05-26 RX ORDER — GAUZE BANDAGE 2" X 2"
100 BANDAGE TOPICAL DAILY
Status: DISCONTINUED | OUTPATIENT
Start: 2022-05-26 | End: 2022-05-26 | Stop reason: HOSPADM

## 2022-05-26 RX ORDER — GUAIFENESIN/DEXTROMETHORPHAN 100-10MG/5
10 SYRUP ORAL EVERY 6 HOURS PRN
Status: DISCONTINUED | OUTPATIENT
Start: 2022-05-26 | End: 2022-05-26 | Stop reason: HOSPADM

## 2022-05-26 RX ADMIN — PANTOPRAZOLE SODIUM 40 MG: 40 INJECTION, POWDER, LYOPHILIZED, FOR SOLUTION INTRAVENOUS at 05:31

## 2022-05-26 RX ADMIN — THERA TABS 1 TABLET: TAB at 05:31

## 2022-05-26 RX ADMIN — OXYCODONE 2.5 MG: 5 TABLET ORAL at 08:25

## 2022-05-26 RX ADMIN — SODIUM CHLORIDE, POTASSIUM CHLORIDE, SODIUM LACTATE AND CALCIUM CHLORIDE: 600; 310; 30; 20 INJECTION, SOLUTION INTRAVENOUS at 01:54

## 2022-05-26 RX ADMIN — THIAMINE HCL TAB 100 MG 100 MG: 100 TAB at 05:31

## 2022-05-26 RX ADMIN — SODIUM CHLORIDE 80 MG: 9 INJECTION, SOLUTION INTRAVENOUS at 01:06

## 2022-05-26 RX ADMIN — FOLIC ACID 1 MG: 1 TABLET ORAL at 05:31

## 2022-05-26 ASSESSMENT — ENCOUNTER SYMPTOMS
HEADACHES: 0
SHORTNESS OF BREATH: 0
NAUSEA: 1
SINUS PAIN: 0
FEVER: 0
MYALGIAS: 0
BLOOD IN STOOL: 1
PALPITATIONS: 0
VOMITING: 1
DEPRESSION: 0
FOCAL WEAKNESS: 0
NAUSEA: 0
INSOMNIA: 0
MEMORY LOSS: 0
CHILLS: 0
ABDOMINAL PAIN: 0
WEAKNESS: 1
DOUBLE VISION: 0
FALLS: 0
DIZZINESS: 1
EYE PAIN: 0
HEMOPTYSIS: 0
DIZZINESS: 0
WEAKNESS: 0
BLURRED VISION: 0
DIARRHEA: 0
FLANK PAIN: 0
COUGH: 0
EYE DISCHARGE: 0
POLYDIPSIA: 0
STRIDOR: 0
HEARTBURN: 1
VOMITING: 0
BRUISES/BLEEDS EASILY: 0

## 2022-05-26 ASSESSMENT — COGNITIVE AND FUNCTIONAL STATUS - GENERAL
MOBILITY SCORE: 24
SUGGESTED CMS G CODE MODIFIER MOBILITY: CH
SUGGESTED CMS G CODE MODIFIER DAILY ACTIVITY: CH
DAILY ACTIVITIY SCORE: 24

## 2022-05-26 ASSESSMENT — LIFESTYLE VARIABLES
HOW MANY TIMES IN THE PAST YEAR HAVE YOU HAD 5 OR MORE DRINKS IN A DAY: 0
ON A TYPICAL DAY WHEN YOU DRINK ALCOHOL HOW MANY DRINKS DO YOU HAVE: 1
TOTAL SCORE: 0
EVER FELT BAD OR GUILTY ABOUT YOUR DRINKING: NO
EVER HAD A DRINK FIRST THING IN THE MORNING TO STEADY YOUR NERVES TO GET RID OF A HANGOVER: NO
SUBSTANCE_ABUSE: 0
TOTAL SCORE: 0
CONSUMPTION TOTAL: NEGATIVE
AVERAGE NUMBER OF DAYS PER WEEK YOU HAVE A DRINK CONTAINING ALCOHOL: 3
DOES PATIENT WANT TO STOP DRINKING: NO
HAVE PEOPLE ANNOYED YOU BY CRITICIZING YOUR DRINKING: NO
ALCOHOL_USE: YES
HAVE YOU EVER FELT YOU SHOULD CUT DOWN ON YOUR DRINKING: NO
TOTAL SCORE: 0

## 2022-05-26 ASSESSMENT — PAIN DESCRIPTION - PAIN TYPE
TYPE: ACUTE PAIN
TYPE: ACUTE PAIN

## 2022-05-26 ASSESSMENT — PATIENT HEALTH QUESTIONNAIRE - PHQ9
1. LITTLE INTEREST OR PLEASURE IN DOING THINGS: NOT AT ALL
2. FEELING DOWN, DEPRESSED, IRRITABLE, OR HOPELESS: NOT AT ALL
SUM OF ALL RESPONSES TO PHQ9 QUESTIONS 1 AND 2: 0

## 2022-05-26 NOTE — ED NOTES
destiny Callaway RN to bedside. Bedside report given. IVF continue to floor. Pt transported on [facility]. All belongings with pt at time of transfer. Care relinquished.

## 2022-05-26 NOTE — PROGRESS NOTES
4 Eyes Skin Assessment Completed by CHRISTINA Leonard and CHRISTINA Callaway.    Head WDL  Ears WDL  Nose WDL  Mouth WDL  Neck WDL  Breast/Chest WDL  Shoulder Blades WDL  Spine WDL  (R) Arm/Elbow/Hand WDL  (L) Arm/Elbow/Hand WDL  Abdomen WDL  Groin WDL  Scrotum/Coccyx/Buttocks WDL  (R) Leg WDL  (L) Leg WDL  (R) Heel/Foot/Toe WDL  (L) Heel/Foot/Toe WDL          Devices In Places Tele Box      Interventions In Place N/A    Possible Skin Injury No    Pictures Uploaded Into Epic N/A  Wound Consult Placed N/A  RN Wound Prevention Protocol Ordered No

## 2022-05-26 NOTE — ED TRIAGE NOTES
"Chief Complaint   Patient presents with   • Rectal Bleeding     Blood clots in stool.  Started at 1400, has had 6 BM this afternoon.  Hx of colon CA. Not currently receiving chemotherapy or radiation. Many abdominal surgeries in the past   • Dizziness       Pt ambulated to triage. Pt A&Ox4, came in for above complaint.     Pt to lobby . Pt educated on alerting staff in changes to condition. Pt verbalized understanding. Protocol ordered.     /82   Pulse 74   Temp 36.6 °C (97.9 °F) (Temporal)   Resp 16   Ht 1.753 m (5' 9\")   Wt 90.3 kg (199 lb 1.2 oz)   SpO2 98%   BMI 29.40 kg/m²     "

## 2022-05-26 NOTE — DISCHARGE SUMMARY
Discharge Summary    CHIEF COMPLAINT ON ADMISSION  Chief Complaint   Patient presents with   • Rectal Bleeding     Blood clots in stool.  Started at 1400, has had 6 BM this afternoon.  Hx of colon CA. Not currently receiving chemotherapy or radiation. Many abdominal surgeries in the past   • Dizziness       Reason for Admission  Rectal Bleeding     Admission Date  5/25/2022    CODE STATUS  Full Code    HPI & HOSPITAL COURSE  This is a 31 y.o. male here with rectal bleeding  Eddie Daniel is a 31 y.o. male with h/o colon CA 2011 who presented 5/25/2022 with rectal bleed. Pt reported having sudden onset of bleeding from rectum, large volume. Reported becoming lightheaded. Due to h/o colon CA, prior surgeries, became concern and came to ER for evaluation. Denies NSAID abuse, ETOH abuse. Hgb 15.4, no further bleeding seen. Admission requested for observation.    Is to be noted the patient does have significant history of familial polyposis.  Of note patient [facility] and [facility] in [place] for financial reasons he was not able to follow-up with either 1 of those practices.  Patient did have a colonoscopy in approximately 8 years.  Patient during hospitalization had hemoglobin that is well within normal limits no decrease in hemoglobin.  Furthermore patient also had a CT scan of the abdomen which was significant only for postsurgical changes no evidence of mass.  Lastly, patient was evaluated by [facility] while hospitalized colonoscopy was offered to the patient however patient declined procedure as he wishes to go to his son's graduation.  Risks of declining the procedure at this point time were clearly explained to the patient up to and including possible morbidity mortality for not having evaluation and possible occult malignancy patient understood these risk and despite this declined procedure at this point time.  Patient was offered to  have follow-up with [facility].  Patient also have close follow-up with his primary care provider of record.  Patient did not require medications upon discharge.  Therefore, he is discharged in good and stable condition to home with close outpatient follow-up.    The patient recovered much more quickly than anticipated on admission.    Discharge Date  5/26/2022    FOLLOW UP ITEMS POST DISCHARGE  Take all medication as prescribed  Please go to follow-up appointments as indicated    DISCHARGE DIAGNOSES  Principal Problem:    Rectal bleed POA: Yes  Active Problems:    GERD (gastroesophageal reflux disease) POA: Yes    History of colon cancer POA: Yes    Acute blood loss anemia POA: Unknown    Alcohol use POA: Unknown  Resolved Problems:    * No resolved hospital problems. *      FOLLOW UP    Jessica Gutierrez M.D.  [address] [id number] [phone]    Follow up in 2 week(s)  Post hospital discharge    [facility] - [address] [id number] [phone]  Follow up in 2 week(s)  Post hospital discharge      MEDICATIONS ON DISCHARGE     Medication List      You have not been prescribed any medications.         Allergies  Allergies   Allergen Reactions   • Hydrocodone-Acetaminophen Rash and Vomiting     Rash on neck and nausea/vomiting.   • Tomato Hives     Fresh only       DIET  Orders Placed This Encounter   Procedures   • Diet Order Diet: Clear Liquid     Standing Status:   Standing     Number of Occurrences:   1     Order Specific Question:   Diet:     Answer:   Clear Liquid [10]       ACTIVITY  As tolerated.  Weight bearing as tolerated    CONSULTATIONS  [facility]    PROCEDURES  None    LABORATORY  Lab Results   Component Value Date    SODIUM 141 05/25/2022    POTASSIUM 3.9 05/25/2022    CHLORIDE 108 05/25/2022    CO2 20 05/25/2022    GLUCOSE 97 05/25/2022    BUN 15 05/25/2022    CREATININE 1.02  05/25/2022    CREATININE 1.0 01/11/2007        Lab Results   Component Value Date    WBC 8.2 05/25/2022    HEMOGLOBIN 14.5 05/26/2022    HEMATOCRIT 41.0 (L) 05/26/2022    PLATELETCT 280 05/25/2022        Total time of the discharge process exceeds 35 minutes.

## 2022-05-26 NOTE — H&P
Hospital Medicine History & Physical Note    Date of Service  5/26/2022    Primary Care Physician  Jessica Gutierrez M.D.    Consultants  None    Code Status  Full Code    Chief Complaint  Chief Complaint   Patient presents with   • Rectal Bleeding     Blood clots in stool.  Started at 1400, has had 6 BM this afternoon.  Hx of colon CA. Not currently receiving chemotherapy or radiation. Many abdominal surgeries in the past   • Dizziness       History of Presenting Illness  Eddie Daniel is a 31 y.o. male with h/o colon CA 2011 who presented 5/25/2022 with rectal bleed. Pt reported having sudden onset of bleeding from rectum, large volume. Reported becoming lightheaded. Due to h/o colon CA, prior surgeries, became concern and came to ER for evaluation. Denies NSAID abuse, ETOH abuse. Hgb 15.4, no further bleeding seen. Admission requested for observation.    I discussed the plan of care with patient, family and bedside RN.    Review of Systems  Review of Systems   Constitutional: Negative for chills and fever.   HENT: Negative for hearing loss and tinnitus.    Eyes: Negative for blurred vision and double vision.   Respiratory: Negative for cough and shortness of breath.    Cardiovascular: Negative for chest pain, palpitations and leg swelling.   Gastrointestinal: Positive for blood in stool, heartburn, nausea and vomiting. Negative for abdominal pain and diarrhea.   Genitourinary: Negative for dysuria and flank pain.   Musculoskeletal: Negative for falls and myalgias.   Skin: Negative for itching and rash.   Neurological: Positive for dizziness and weakness. Negative for focal weakness and headaches.   Endo/Heme/Allergies: Negative for environmental allergies. Does not bruise/bleed easily.   Psychiatric/Behavioral: Negative for depression and substance abuse.   All other systems reviewed and are negative.      Past Medical History   has a past medical history of Blood clotting disorder (HCC) (2014), Cancer  ([facility]) (dx 2010), Colon cancer ([facility]) (2011), Colon polyps, Dental disorder, Heart burn, Indigestion, Migraine, MRSA (methicillin resistant Staphylococcus aureus) (05/2011), Other specified disorder of intestines, Pain, Right ankle pain (08/03/2020), Seasonal allergies, and Snoring.    Surgical History   has a past surgical history that includes pr repair bladder wound/inj,complic (2007); arthroscopy, knee (Right, 02/28/2014); ileostomy (3/8/2011); colectomy (3/8/2011); ileostomy (5/3/2011); exploratory laparotomy (5/23/2011); ileostomy (5/23/2011); ileostomy (4/3/2012); knee arthrotomy (2007); knee arthroscopy (11/6/2014); open osteochondral allograft (11/6/2014); knee arthroscopy (Right, 2014); shoulder arthroscopy w/ rotator cuff repair (Right, 2018); tonsillectomy (06/2020); achilles tendon repair (Right, 8/6/2020); exostosis excision (Right, 8/6/2020); and tendon transfer (Right, 8/6/2020).     Family History  family history includes Cancer in his father, maternal grandmother, paternal grandmother, and another family member; Diabetes in his paternal grandmother.   Family history reviewed with patient. There is no family history that is pertinent to the chief complaint.     Social History   reports that he has never smoked. His smokeless tobacco use includes chew. He reports current alcohol use of about 4.2 - 8.4 oz of alcohol per week. He reports that he does not use drugs.    Allergies  Allergies   Allergen Reactions   • Hydrocodone-Acetaminophen Rash and Vomiting     Rash on neck and nausea/vomiting.   • Tomato Hives     Fresh only       Medications  Prior to Admission Medications   Prescriptions Last Dose Informant Patient Reported? Taking?   Cetirizine HCl (ZYRTEC ALLERGY) 10 MG Cap   No No   Sig: Take 10 mg by mouth every day.   acetaminophen (TYLENOL) 500 MG Tab   Yes No   Sig: Take 1,000 mg by mouth every 6 hours as needed.   tramadol (ULTRAM) 50 MG Tab   Yes No   Sig: Take 50 mg by mouth every 6 hours as  needed.      Facility-Administered Medications: None       Physical Exam  Temp:  [36.6 °C (97.9 °F)] 36.6 °C (97.9 °F)  Pulse:  [74] 74  Resp:  [16] 16  BP: (126)/(82) 126/82  SpO2:  [98 %] 98 %  Blood Pressure: 126/82   Temperature: 36.6 °C (97.9 °F)   Pulse: 74   Respiration: 16   Pulse Oximetry: 98 %       Physical Exam  Constitutional:       Appearance: Normal appearance.   HENT:      Head: Normocephalic and atraumatic.      Nose: Nose normal.      Mouth/Throat:      Mouth: Mucous membranes are dry.      Pharynx: Oropharynx is clear.   Eyes:      General: No scleral icterus.     Extraocular Movements: Extraocular movements intact.   Cardiovascular:      Rate and Rhythm: Normal rate and regular rhythm.      Pulses: Normal pulses.      Heart sounds:     No friction rub.   Pulmonary:      Effort: Pulmonary effort is normal. No respiratory distress.      Breath sounds: No stridor. No wheezing.   Abdominal:      General: Bowel sounds are normal. There is distension.      Palpations: Abdomen is soft.      Tenderness: There is no abdominal tenderness. There is no guarding or rebound.   Musculoskeletal:         General: No swelling or tenderness. Normal range of motion.      Cervical back: Neck supple. No tenderness.   Skin:     General: Skin is warm and dry.      Capillary Refill: Capillary refill takes less than 2 seconds.   Neurological:      General: No focal deficit present.      Mental Status: He is alert and oriented to person, place, and time.   Psychiatric:         Mood and Affect: Mood normal.         Laboratory:  Recent Labs     05/25/22 2236   WBC 8.2   RBC 5.07   HEMOGLOBIN 15.4   HEMATOCRIT 43.4   MCV 85.6   MCH 30.4   MCHC 35.5*   RDW 40.5   PLATELETCT 280   MPV 8.9*     Recent Labs     05/25/22 2236   SODIUM 141   POTASSIUM 3.9   CHLORIDE 108   CO2 20   GLUCOSE 97   BUN 15   CREATININE 1.02   CALCIUM 9.1     Recent Labs     05/25/22 2236   ALTSGPT 30   ASTSGOT 27   ALKPHOSPHAT 78   TBILIRUBIN 0.5    LIPASE 62   GLUCOSE 97     Recent Labs     05/25/22  2236   APTT 29.6   INR 1.00     No results for input(s): NTPROBNP in the last 72 hours.      No results for input(s): TROPONINT in the last 72 hours.    Imaging:  DX-CHEST-PORTABLE (1 VIEW)   Final Result         1.  No acute cardiopulmonary disease.      US-ABDOMEN COMPLETE SURVEY    (Results Pending)       X-Ray:  I have personally reviewed the images and compared with prior images.    Assessment/Plan:  Justification for Admission Status  I anticipate this patient is appropriate for observation status at this time    * Rectal bleed- (present on admission)  Assessment & Plan  Rectal bleed  PPI  Avoid NSAID product  Monitor HH and transfuse as needed    Acute blood loss anemia  Assessment & Plan  Due to above  Anemia workup  Transfuse for Hgb<7 or hemodynamic instability    History of colon cancer- (present on admission)  Assessment & Plan  Per history    GERD (gastroesophageal reflux disease)- (present on admission)  Assessment & Plan  PPI    Alcohol use  Assessment & Plan  Social  Denies abuse  MV      VTE prophylaxis: SCDs/TEDs

## 2022-05-26 NOTE — DIETARY
"Nutrition services: Day 0 of admit.  Eddie Daniel is a 31 y.o. male with admitting DX of Rectal Bleed   Consult received for MST of 2.      Assessment:  Height: 175.3 cm (5' 9\")  Weight: 90.3 kg (199 lb 1.2 oz) on 5/25 via stand up scale.  Body mass index is 29.4 kg/m²., BMI classification: Overweight  Diet/Intake: Clear Liquid    Evaluation:   1. MST of 2 screen reports that Pt has lost 2-13 lbs over the last 6 months w/ a decrease in appetite.  2. Spoke w/ Pt and significant other at bedside. Pt and significant other report Pt's weight is variable. Pt states weight fluctuates ever since his hx of multiple surgeries. Pt and significant other guess that a UBW is \"180-190ish\" lbs. Pt states that he does not really gain weight, that he eats but does not gain anything. Per chart review Pt UBW appears to be around 190-200 lbs. Pt had one dip in weight but either appears Pt has gained the weight back.  Vitals 6/7/2019 10/27/2019 11/25/2019 1/17/2020 2/18/2020   WEIGHT 198.86 201 197.75 196.21 194.67     Vitals 5/15/2020 5/18/2020 8/6/2020 8/1/2021 5/25/2022   WEIGHT 199 199 192.02 175 199.08     3. Pt states he has a healthy appetite and that a typical day is the Pt trying to eat all throughout the day.  4. Pt appeared adequately nourished.  5. Labs: Reviewed.  6. Meds: BM protocol. Thiamine. Multivitamin. Folic acid.  7. Last BM: PTA.    Malnutrition Risk: Does not meet criteria.    Recommendations/Plan:  1. Advance Diet when medically feasible per MD.  2. Encourage intake of all meals  3. Document intake of all meals as % taken in ADL's to provide interdisciplinary communication across all shifts.   4. Monitor weight.  5. Nutrition rep will continue to see patient for ongoing meal and snack preferences.     RD available PRN.          "

## 2022-05-26 NOTE — DISCHARGE INSTRUCTIONS
Discharge Instructions    Discharged to home by car with relative. Discharged via wheelchair, hospital escort: Yes.  Special equipment needed: Not Applicable    Be sure to schedule a follow-up appointment with your primary care doctor or any specialists as instructed.     Discharge Plan:   Diet Plan: Discussed  Activity Level: Discussed  Confirmed Follow up Appointment: Patient to Call and Schedule Appointment  Confirmed Symptoms Management: Discussed  Medication Reconciliation Updated: Yes    I understand that a diet low in cholesterol, fat, and sodium is recommended for good health. Unless I have been given specific instructions below for another diet, I accept this instruction as my diet prescription.   Other diet: regular    Special Instructions: None    Is patient discharged on Warfarin / Coumadin?   No     Depression / Suicide Risk    As you are discharged from this [facility] facility, it is important to learn how to keep safe from harming yourself.    Recognize the warning signs:  Abrupt changes in personality, positive or negative- including increase in energy   Giving away possessions  Change in eating patterns- significant weight changes-  positive or negative  Change in sleeping patterns- unable to sleep or sleeping all the time   Unwillingness or inability to communicate  Depression  Unusual sadness, discouragement and loneliness  Talk of wanting to die  Neglect of personal appearance   Rebelliousness- reckless behavior  Withdrawal from people/activities they love  Confusion- inability to concentrate     If you or a loved one observes any of these behaviors or has concerns about self-harm, here's what you can do:  Talk about it- your feelings and reasons for harming yourself  Remove any means that you might use to hurt yourself (examples: pills, rope, extension cords, firearm)  Get professional help from the community (Mental Health, Substance Abuse, psychological counseling)  Do not be alone:Call  your Safe Contact- someone whom you trust who will be there for you.  Call your local CRISIS HOTLINE 410-8665 or 820-297-4667  Call your local Children's Mobile Crisis Response Team [place] (263) 890-8830 or www.[place]  Call the toll free National Suicide Prevention Hotlines   National Suicide Prevention Lifeline 906-863-GDFV (3421)  [facility] 800-SUICIDE (305-7572)    Rectal Bleeding    Rectal bleeding is when blood comes out of the opening of the butt (anus). People with this kind of bleeding may notice bright red blood in their underwear or in the toilet after they poop (have a bowel movement). They may also have dark red or black poop (stool). Rectal bleeding is often a sign that something is wrong. It needs to be checked by a doctor.  Follow these instructions at home:  Watch for any changes in your condition. Take these actions to help with bleeding and discomfort:  Eat a diet that is high in fiber. This will keep your poop soft so it is easier for you to poop without pushing too hard. Ask your doctor to tell you what foods and drinks are high in fiber.  Drink enough fluid to keep your pee (urine) clear or pale yellow. This also helps keep your poop soft.  Try taking a warm bath. This may help with pain.  Keep all follow-up visits as told by your doctor. This is important.  Get help right away if:  You have new bleeding.  You have more bleeding than before.  You have black or dark red poop.  You throw up (vomit) blood or something that looks like coffee grounds.  You have pain or tenderness in your belly (abdomen).  You have a fever.  You feel weak.  You feel sick to your stomach (nauseous).  You pass out (faint).  You have very bad pain in your butt.  You cannot poop.  This information is not intended to replace advice given to you by your health care provider. Make sure you discuss any questions you have with your health care provider.  Document Released: 08/29/2012 Document  Revised: 11/30/2018 Document Reviewed: 02/12/2017  Elsevier Patient Education © 2020 [employer]

## 2022-05-26 NOTE — PROGRESS NOTES
Discharge instructions reviewed and signed. Pt verbalizes to follow up with PCP and GI consultants as recommended. IV and tele box removed. Pt. Discharged home with wife.

## 2022-05-26 NOTE — CARE PLAN
The patient is Watcher - Medium risk of patient condition declining or worsening    Shift Goals  Clinical Goals: monitor labs, pain managment  Patient Goals: rest, comfort    Progress made toward(s) clinical / shift goals:       Problem: Knowledge Deficit - Standard  Goal: Patient and family/care givers will demonstrate understanding of plan of care, disease process/condition, diagnostic tests and medications  Outcome: Progressing     Problem: Pain - Standard  Goal: Alleviation of pain or a reduction in pain to the patient’s comfort goal  Outcome: Progressing       Patient is not progressing towards the following goals:  N/a

## 2022-05-26 NOTE — ED NOTES
Assumed report and patient care from Leann VASQUEZ. Patient is resting comfortably in bed with no signs of labored breathing or restlessness. POC discussed. No questions or concerns at this time. Whiteboard updated. Xray at bedside. Safety measures in place, call light within reach.

## 2022-05-26 NOTE — PROGRESS NOTES
Report received from Rn. Assumed pt care. Pt is complaining of 4/10 abdominal pain, MD notified for additional PRN pain medication. Pt A&O x 4. Fall precautions in place, call light and belongings within reach, bed in lowest position.

## 2022-05-26 NOTE — ED NOTES
Pt sleeping comfortable on gurney, remains in sinus rhythm/sinus bradycardia. IVF continues to infuse.

## 2022-05-26 NOTE — HOSPITAL COURSE
Eddie Daniel is a 31 y.o. male with h/o colon CA 2011 who presented 5/25/2022 with rectal bleed. Pt reported having sudden onset of bleeding from rectum, large volume. Reported becoming lightheaded. Due to h/o colon CA, prior surgeries, became concern and came to ER for evaluation. Denies NSAID abuse, ETOH abuse. Hgb 15.4, no further bleeding seen. Admission requested for observation.
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
## 2022-05-26 NOTE — ED PROVIDER NOTES
ED Provider Note    Scribed for Christian Huang M.D. by Bean Feliciano. 5/26/2022  12:26 AM    Primary care provider: Jessica Gutierrez M.D.  Means of arrival: Walk in  History obtained from: Patient  History limited by: None    CHIEF COMPLAINT  Chief Complaint   Patient presents with   • Rectal Bleeding     Blood clots in stool.  Started at 1400, has had 6 BM this afternoon.  Hx of colon CA. Not currently receiving chemotherapy or radiation. Many abdominal surgeries in the past   • Dizziness       HPI  Eddie Daniel is a 31 y.o. male who presents to the Emergency Department for evaluation of moderate hematochezia onset yesterday at 2:00 PM. The patient states that he began experiencing approximately 6 episodes of hematochezia, with the last episode occurring at approximately 8:00 PM. He describes the hematochezia as a mix of dark and bright red blood. Patient reports abdominal pain and lightheadedness. He denies any recent trauma or loss of consciousness. The patient notes a history of colon cancer but has not had any chemotherapy or radiation therapy in at least 10 years. During his first abdominal surgery of many, the patient states that he experienced some leakage that was repaired. He notes 2 colonoscopies within the last 5 years, but is not currently followed by a GI specialist or PCP. Patient notes using Ibuprofen within the last month for headaches, including 3 separate episodes medicating with 400 mg of Ibuprofen. He denies any daily Aspirin, Motrin, or blood thinner usage. The patient notes occasional alcohol consumption but will indulge in more than 6 beers intermittently. He denies any recent heavy alcohol consumption. No alleviating or exacerbating factors noted.     REVIEW OF SYSTEMS  Pertinent positives include: hematochezia, abdominal pain, and lightheadedness. Pertinent negatives include: recent trauma or loss of consciousness. See history of present illness. All other systems are negative.      PAST MEDICAL HISTORY   has a past medical history of Blood clotting disorder (HCC) (2014), Cancer (HCC) (dx 2010), Colon cancer (HCC) (2011), Colon polyps, Dental disorder, Heart burn, Indigestion, Migraine, MRSA (methicillin resistant Staphylococcus aureus) (05/2011), Other specified disorder of intestines, Pain, Right ankle pain (08/03/2020), Seasonal allergies, and Snoring.    SURGICAL HISTORY   has a past surgical history that includes repair bladder wound/inj,complic (2007); arthroscopy, knee (Right, 02/28/2014); ileostomy (3/8/2011); colectomy (3/8/2011); ileostomy (5/3/2011); exploratory laparotomy (5/23/2011); ileostomy (5/23/2011); ileostomy (4/3/2012); knee arthrotomy (2007); knee arthroscopy (11/6/2014); open osteochondral allograft (11/6/2014); knee arthroscopy (Right, 2014); shoulder arthroscopy w/ rotator cuff repair (Right, 2018); tonsillectomy (06/2020); achilles tendon repair (Right, 8/6/2020); exostosis excision (Right, 8/6/2020); and tendon transfer (Right, 8/6/2020).    SOCIAL HISTORY  Social History     Tobacco Use   • Smoking status: Never Smoker   • Smokeless tobacco: Current User     Types: Chew   Vaping Use   • Vaping Use: Never used   Substance Use Topics   • Alcohol use: Yes     Alcohol/week: 4.2 - 8.4 oz     Types: 7 - 14 Cans of beer per week     Comment: 1-2 per day   • Drug use: Never      Social History     Substance and Sexual Activity   Drug Use Never       FAMILY HISTORY  Family History   Problem Relation Age of Onset   • Cancer Other    • Cancer Father    • Cancer Maternal Grandmother    • Cancer Paternal Grandmother    • Diabetes Paternal Grandmother        CURRENT MEDICATIONS  Home Medications     Reviewed by Faviola Marrufo (Pharmacy Tech) on 05/26/22 at 0122  Med List Status: Complete   Medication Last Dose Status        Patient Chun Taking any Medications                       ALLERGIES  Allergies   Allergen Reactions   • Hydrocodone-Acetaminophen Rash and Vomiting  "    Rash on neck and nausea/vomiting.   • Tomato Hives     Fresh only       PHYSICAL EXAM  VITAL SIGNS: /82   Pulse 74   Temp 36.6 °C (97.9 °F) (Temporal)   Resp 16   Ht 1.753 m (5' 9\")   Wt 90.3 kg (199 lb 1.2 oz)   SpO2 98%   BMI 29.40 kg/m²     Constitutional: Alert in no apparent distress.  HENT: No signs of trauma, Bilateral external ears normal, Nose normal. Uvula midline.   Eyes: Pupils are equal and reactive, Conjunctiva normal, Non-icteric.   Neck: Normal range of motion, No tenderness, Supple, No stridor.   Lymphatic: No lymphadenopathy noted.   Cardiovascular: Regular rate and rhythm, no murmurs.   Thorax & Lungs: Normal breath sounds, No respiratory distress, No wheezing, No chest tenderness.   Abdomen:  Soft, Upper abdominal tenderness to palpation greater upper than lower, No peritoneal signs, No masses, No pulsatile masses.   Skin: Warm, Dry, No erythema, No rash.   Back: No bony tenderness, No CVA tenderness.   Extremities: Intact distal pulses, No edema, No tenderness, No cyanosis.  Musculoskeletal: Good range of motion in all major joints. No tenderness to palpation or major deformities noted.   Neurologic: Alert , Normal motor function, Normal sensory function, No focal deficits noted.   Psychiatric: Affect normal, Judgment normal, Mood normal.       DIAGNOSTIC STUDIES / PROCEDURES    LABS  Labs Reviewed   CBC WITH DIFFERENTIAL - Abnormal; Notable for the following components:       Result Value    MCHC 35.5 (*)     MPV 8.9 (*)     All other components within normal limits    Narrative:     Indicate which anticoagulants the patient is on:->UNKNOWN   FERRITIN - Abnormal; Notable for the following components:    Ferritin 21.3 (*)     All other components within normal limits   IRON/TOTAL IRON BIND - Abnormal; Notable for the following components:    Total Iron Binding 468 (*)     Unsat Iron Binding 405 (*)     % Saturation 13 (*)     All other components within normal limits "   RETICULOCYTES COUNT - Abnormal; Notable for the following components:    Reticulocyte Count 2.7 (*)     Retic, Absolute 0.14 (*)     All other components within normal limits   TRANSFERRIN - Abnormal; Notable for the following components:    Transferrin 390 (*)     All other components within normal limits   COD (ADULT)   COMP METABOLIC PANEL    Narrative:     Indicate which anticoagulants the patient is on:->UNKNOWN   LIPASE    Narrative:     Indicate which anticoagulants the patient is on:->UNKNOWN   PROTHROMBIN TIME    Narrative:     Indicate which anticoagulants the patient is on:->UNKNOWN   APTT    Narrative:     Indicate which anticoagulants the patient is on:->UNKNOWN   ESTIMATED GFR    Narrative:     Indicate which anticoagulants the patient is on:->UNKNOWN   VITAMIN B12   FOLATE   LDH   HAPTOGLOBIN   OCCULT BLOOD STOOL   HEMOGLOBIN AND HEMATOCRIT      All labs reviewed by me.      RADIOLOGY  DX-CHEST-PORTABLE (1 VIEW)   Final Result         1.  No acute cardiopulmonary disease.      US-ABDOMEN COMPLETE SURVEY    (Results Pending)   CT-ABDOMEN-PELVIS W/O    (Results Pending)     The radiologist's interpretation of all radiological studies have been reviewed by me.    COURSE & MEDICAL DECISION MAKING  Nursing notes, VS, PMSFHx reviewed in chart.    31 y.o. male p/w chief complaint of hematochezia onset yesterday at 2:00 PM.    1x large bore IV placed. Type & screen sent.  Regular rate, normotensive, No hemorraghic shock.  Given Protonix   Unclear etiology at this time.   Patient with Glascow Blatchford score of 1 making him high risk for GI bleed given his melena  No history of cirrhosis therefore octreotide and ceftriaxone held at this time  No history of EtOH abuse  Significant history of cancer with ulcers and bleeding ulcers requiring surgical management in the past.         12:26 AM Patient seen and examined at bedside. Ordered APTT, Prothrombin Time, Lipase, CMP, CBC with diff, COD (Adult), and  DX-Chest to further evaluate. Patient will be treated with Protonix 80 mg in  mL IVPB. Discussed utilizing imaging and labs to further evaluate the patient for hospitalization, they are amenable to the plan of care.    12:43 AM - Paged Hospitalist     12:56 AM I discussed the patient's case and the above findings with Dr. Vick (Hospitalist) who will assess the patient for hospitalization.     I verified that the patient was wearing a mask and I was wearing appropriate PPE every time I entered the room. The patient's mask was on the patient at all times during my encounter except for a brief view of the oropharynx.       DISPOSITION:  Patient will be hospitalized by Dr. Vick in guarded condition.      FINAL IMPRESSION  1. Gastrointestinal hemorrhage, unspecified gastrointestinal hemorrhage type    2. Rectal bleeding    3. Melena          Bean MAYES (Scribe), am scribing for, and in the presence of, Christian Huang M.D..    Electronically signed by: Bean Feliciano (Scribe), 5/26/2022    IChristian M.D. personally performed the services described in this documentation, as scribed by Bean Feliciano in my presence, and it is both accurate and complete.    The note accurately reflects work and decisions made by me.  Christian Huang M.D.  5/26/2022  7:53 AM

## 2022-05-26 NOTE — PROGRESS NOTES
Patient oriented to room, A&O x4, able to make needs known. Pain 4/10 - requesting pain med more than tylenol; will request from day team. Fall education provided, call-light in reach, and lines secured.

## 2022-05-27 LAB — HAPTOGLOB SERPL-MCNC: 119 MG/DL (ref 30–200)

## 2022-08-14 NOTE — OP THERAPY DAILY TREATMENT
Outpatient Physical Therapy  DAILY TREATMENT     [facility] [address]  [address]  [place] [address]  Phone:  [phone]  Fax:  [phone]    Date: 03/06/2019    Patient: Eddie Daniel  YOB: 1990  MRN: [id number]     Time Calculation  Start time: 0900  Stop time: 0950 Time Calculation (min): 50 minutes     Chief Complaint: Shoulder Pain    Visit #: 20    SUBJECTIVE:  Pt reports feeling approx 60% overall, still using R hand to do most reaching and heavier lifting at work.    OBJECTIVE:        Therapeutic Exercises (CPT 38939):     1. UBE (fwd and back), x4 min, L3    2. Foam Roller, pec stretch, alt UE, low trap box    3. Shuttle UE press, x10 ea (2c and 1c)    4. Wall ball roll up and lift off, x10    Therapeutic Treatments and Modalities:     1. Manual Therapy (CPT 20970), MFR UT,/LS, Scap mobs up rot, retract, Subscap release, GH inf glides at EOR flex, inf and ant w/HBH, lat glides at 90 deg flex    2. E Stim Attended (CPT 41220), [place] to infra in SL w/ball roll ups, x15 min    Time-based treatments/modalities:  Manual therapy minutes (CPT 59331): 10 minutes  Therapeutic exercise minutes (CPT 46069): 20 minutes       ASSESSMENT:   Pt w/GH hypomobility anticipated for stage of recovery. Currently able to control approx 90 deg of flex maintaining ER.    PLAN/RECOMMENDATIONS:   Plan for treatment: therapy treatment to continue next visit.  Continue 1x/wk for 2 weeks prior to strengthening at 12 weeks post-op (3/14/19).  Planned interventions for next visit: continue with current treatment.       Pt c/o alcohol intox, SI and auditory hallucinations. Pt reports she Tylenol, unclear how much.

## 2023-03-13 ENCOUNTER — APPOINTMENT (OUTPATIENT)
Dept: RADIOLOGY | Facility: MEDICAL CENTER | Age: 33
End: 2023-03-13
Attending: EMERGENCY MEDICINE
Payer: MEDICAID

## 2023-03-13 ENCOUNTER — HOSPITAL ENCOUNTER (EMERGENCY)
Facility: MEDICAL CENTER | Age: 33
End: 2023-03-13
Attending: EMERGENCY MEDICINE
Payer: MEDICAID

## 2023-03-13 VITALS
HEART RATE: 62 BPM | RESPIRATION RATE: 16 BRPM | HEIGHT: 69 IN | OXYGEN SATURATION: 94 % | TEMPERATURE: 98.4 F | BODY MASS INDEX: 30.5 KG/M2 | WEIGHT: 205.91 LBS | SYSTOLIC BLOOD PRESSURE: 124 MMHG | DIASTOLIC BLOOD PRESSURE: 70 MMHG

## 2023-03-13 DIAGNOSIS — B34.9 VIRAL SYNDROME: ICD-10-CM

## 2023-03-13 DIAGNOSIS — J02.9 SORE THROAT: ICD-10-CM

## 2023-03-13 DIAGNOSIS — R05.9 COUGH, UNSPECIFIED TYPE: ICD-10-CM

## 2023-03-13 DIAGNOSIS — R11.2 NAUSEA AND VOMITING, UNSPECIFIED VOMITING TYPE: ICD-10-CM

## 2023-03-13 LAB
FLUAV RNA SPEC QL NAA+PROBE: NEGATIVE
FLUBV RNA SPEC QL NAA+PROBE: NEGATIVE
RSV RNA SPEC QL NAA+PROBE: NEGATIVE
S PYO DNA SPEC NAA+PROBE: NOT DETECTED
SARS-COV-2 RNA RESP QL NAA+PROBE: NOTDETECTED
SPECIMEN SOURCE: NORMAL

## 2023-03-13 PROCEDURE — 71045 X-RAY EXAM CHEST 1 VIEW: CPT

## 2023-03-13 PROCEDURE — C9803 HOPD COVID-19 SPEC COLLECT: HCPCS | Performed by: EMERGENCY MEDICINE

## 2023-03-13 PROCEDURE — 0241U HCHG SARS-COV-2 COVID-19 NFCT DS RESP RNA 4 TRGT MIC: CPT

## 2023-03-13 PROCEDURE — 700102 HCHG RX REV CODE 250 W/ 637 OVERRIDE(OP): Performed by: EMERGENCY MEDICINE

## 2023-03-13 PROCEDURE — 700111 HCHG RX REV CODE 636 W/ 250 OVERRIDE (IP): Performed by: EMERGENCY MEDICINE

## 2023-03-13 PROCEDURE — 87651 STREP A DNA AMP PROBE: CPT

## 2023-03-13 PROCEDURE — 99283 EMERGENCY DEPT VISIT LOW MDM: CPT

## 2023-03-13 PROCEDURE — A9270 NON-COVERED ITEM OR SERVICE: HCPCS | Performed by: EMERGENCY MEDICINE

## 2023-03-13 RX ORDER — DEXAMETHASONE SODIUM PHOSPHATE 10 MG/ML
10 INJECTION, SOLUTION INTRAMUSCULAR; INTRAVENOUS ONCE
Status: COMPLETED | OUTPATIENT
Start: 2023-03-13 | End: 2023-03-13

## 2023-03-13 RX ORDER — ONDANSETRON 4 MG/1
4 TABLET, ORALLY DISINTEGRATING ORAL ONCE
Status: COMPLETED | OUTPATIENT
Start: 2023-03-13 | End: 2023-03-13

## 2023-03-13 RX ORDER — ONDANSETRON 4 MG/1
4 TABLET, ORALLY DISINTEGRATING ORAL EVERY 6 HOURS PRN
Qty: 20 TABLET | Refills: 0 | Status: SHIPPED | OUTPATIENT
Start: 2023-03-13 | End: 2023-04-17

## 2023-03-13 RX ADMIN — LIDOCAINE HYDROCHLORIDE 30 ML: 20 SOLUTION OROPHARYNGEAL at 09:15

## 2023-03-13 RX ADMIN — DEXAMETHASONE SODIUM PHOSPHATE 10 MG: 10 INJECTION INTRAMUSCULAR; INTRAVENOUS at 09:15

## 2023-03-13 RX ADMIN — ONDANSETRON 4 MG: 4 TABLET, ORALLY DISINTEGRATING ORAL at 09:15

## 2023-03-13 ASSESSMENT — FIBROSIS 4 INDEX: FIB4 SCORE: 0.6

## 2023-03-13 NOTE — Clinical Note
Eddie Daniel was seen and treated in our emergency department on 3/13/2023.  He may return to work on 03/16/2023.       If you have any questions or concerns, please don't hesitate to call.      Alicia Horn M.D.

## 2023-03-13 NOTE — ED PROVIDER NOTES
ED Provider Note    Scribed for Alicia Horn M.D. by Ale Ashby. 3/13/2023, 8:56 AM.    Primary care provider: Jessica Gutierrez M.D.  Means of arrival: Walk in  History obtained from: patient   History limited by: none    CHIEF COMPLAINT  Chief Complaint   Patient presents with    Sore Throat     All symptoms X 3 days, pt states son has a cough but no other known sick contacts, pt is not vaccinated against flu/covid    Headache    Chills    Body Aches    Vomiting       HPI/ROS  Eddie Daniel is a 32 y.o. male who presents to the Emergency Department for sore throat onset 3 days ago. He has associated headache, chills, cough, and vomiting. Patient states his child is sick at home with similar symptoms. He has a history of colon cancer and is in remission.  Not on any form of chemotherapy at this time.    EXTERNAL RECORDS REVIEWED  None pertinent    LIMITATION TO HISTORY   Select: : None    OUTSIDE HISTORIAN(S):  none      PAST MEDICAL HISTORY   has a past medical history of Blood clotting disorder (HCC) (2014), Cancer (HCC) (dx 2010), Colon cancer ([facility]) (2011), Colon polyps, Dental disorder, Heart burn, Indigestion, Migraine, MRSA (methicillin resistant Staphylococcus aureus) (05/2011), Other specified disorder of intestines, Pain, Right ankle pain (08/03/2020), Seasonal allergies, and Snoring.    SURGICAL HISTORY   has a past surgical history that includes repair bladder wound/inj,complic (2007); arthroscopy, knee (Right, 02/28/2014); ileostomy (3/8/2011); colectomy (3/8/2011); ileostomy (5/3/2011); exploratory laparotomy (5/23/2011); ileostomy (5/23/2011); ileostomy (4/3/2012); knee arthrotomy (2007); knee arthroscopy (11/6/2014); open osteochondral allograft (11/6/2014); knee arthroscopy (Right, 2014); shoulder arthroscopy w/ rotator cuff repair (Right, 2018); tonsillectomy (06/2020); achilles tendon repair (Right, 8/6/2020); exostosis excision (Right, 8/6/2020); and tendon transfer (Right,  "8/6/2020).    SOCIAL HISTORY  Social History     Tobacco Use    Smoking status: Never    Smokeless tobacco: Current     Types: Chew   Vaping Use    Vaping Use: Never used   Substance Use Topics    Alcohol use: Yes     Alcohol/week: 4.2 - 8.4 oz     Types: 7 - 14 Cans of beer per week     Comment: 1-2 per day    Drug use: Never      Social History     Substance and Sexual Activity   Drug Use Never       FAMILY HISTORY  Family History   Problem Relation Age of Onset    Cancer Other     Cancer Father     Cancer Maternal Grandmother     Cancer Paternal Grandmother     Diabetes Paternal Grandmother        CURRENT MEDICATIONS  Home Medications       Reviewed by Eric Peters R.N. (Registered Nurse) on 03/13/23 at 0802  Med List Status: Not Addressed     Medication Last Dose Status   cyclobenzaprine (FLEXERIL) 5 mg tablet  Active   methylPREDNISolone (MEDROL DOSEPAK) 4 MG Tablet Therapy Pack  Active                    ALLERGIES  Allergies   Allergen Reactions    Hydrocodone-Acetaminophen Rash and Vomiting     Rash on neck and nausea/vomiting.    Tomato Hives     Fresh only       PHYSICAL EXAM  VITAL SIGNS: /77   Pulse 79   Temp 36.1 °C (97 °F) (Temporal)   Resp 16   Ht 1.753 m (5' 9\")   Wt 93.4 kg (205 lb 14.6 oz)   SpO2 97%   BMI 30.41 kg/m²   Vitals reviewed by myself.  Physical Exam  Nursing note and vitals reviewed.  Constitutional: Well-developed and well-nourished. No acute distress.   HENT: Head is normocephalic and atraumatic. Posterior oropharynx is erythematous, no exudates, bilateral TMs are non erythematous.   Eyes: extra-ocular movements intact  Cardiovascular: regular rate and regular rhythm. No murmur heard.  Pulmonary/Chest: Breath sounds normal. No wheezes or rales.   Abdominal: Soft and non-tender. No distention.    Musculoskeletal: Extremities exhibit normal range of motion without edema or tenderness.   Neurological: Awake and alert  Skin: Skin is warm and dry. No rash.        DIAGNOSTIC " STUDIES:  LABS  Labs Reviewed   GROUP A STREP BY PCR   COV-2, FLU A/B, AND RSV BY PCR (CEPHEID)       All labs reviewed and independently interpreted by myself    RADIOLOGY      DX-CHEST-PORTABLE (1 VIEW)   Final Result      No acute cardiopulmonary disease evident.        The radiologist's interpretation of all radiological studies have been reviewed by me.      COURSE & MEDICAL DECISION MAKING    ED Observation Status? Yes; I am placing the patient in to an observation status due to a diagnostic uncertainty as well as therapeutic intensity. Patient placed in observation status at 9:01 AM, 3/13/2023.     Observation plan is as follows: chest x-ray, strep test, and treatment with medication    Upon Reevaluation, the patient's condition has: Improved; and will be discharged.    Patient discharged from ED Observation status at 11:13 AM on (3/13/2023)  .     INITIAL ASSESSMENT AND PLAN    Patient is a 32-year-old male who comes in for evaluation of cough, sore throat, nausea and vomiting and body aches.  Differential diagnosis includes viral syndrome, strep pharyngitis, viral pharyngitis, pneumonia.  Diagnostic work-up includes viral swabs, chest x-ray and strep swab.       REASSESSMENTS   9:00 AM - Patient seen and examined at bedside. Discussed plan of care, including strep test and chest x-ray. Patient agrees to the plan of care.     10:50 AM - Patients strep test is negative and x-ray does not show any signs of pneumonia. His COVID test is pending and I informed him he can access the results on [employer]. Discussed plan for discharge and return precautions. He verbalizes agreement with discharge and plan of care.     ER COURSE AND FINAL DISPOSITION   Patient's initial vitals are within normal limits, he is nontoxic-appearing on exam.  He is managing his secretions without difficulty.  Patient is treated with Zofran, dexamethasone and GI cocktail for his symptoms after which he feels improved.  Chest x-ray returns and  demonstrates no evidence of pneumonia.  Strep swab is negative for strep pharyngitis.  Viral swabs are negative for influenza/RSV/COVID.  Therefore patient is reassured and advised on management of likely viral syndrome.  He is provided with prescription for Zofran for management of his nausea and given strict return precautions.  Patient is then discharged in stable condition.    ADDITIONAL PROBLEM LIST AND RESOURCE UTILIZATION    Additional problems aside from the chief complaint that I have addressed: None    I have discussed management of the patient with the following physicians and SANKET's: none    Discussion of management with other [facility] or appropriate source(s): None     Escalation of care considered, and ultimately not performed: See above.     Barriers to care at this time, including but not limited to: none    Decision tools and prescription drugs considered including, but not limited to: See above.    Please see review of records as noted above    The patient will return for new or worsening symptoms and is stable at the time of discharge.    DISPOSITION:  Patient will be discharged home in stable condition.    FOLLOW UP:  Jessica Gutierrez M.D.  [address] [facility]  [address] [place] [id number]  [phone]            OUTPATIENT MEDICATIONS:  New Prescriptions    ONDANSETRON (ZOFRAN ODT) 4 MG TABLET DISPERSIBLE    Take 1 Tablet by mouth every 6 hours as needed for Nausea/Vomiting.     FINAL IMPRESSION  1. Viral syndrome    2. Nausea and vomiting, unspecified vomiting type    3. Sore throat    4. Cough, unspecified type          Ale MAYES), cristela scribing for, and in the presence of, Alicia Horn M.D..    Electronically signed by: Ale Ashby (Fabián), 3/13/2023    IAlicia M.D. personally performed the services described in this documentation, as scribed by Ale Ashby in my presence, and it is both accurate and complete.    The note accurately reflects work and decisions made  by me.  Alicia Horn M.D.  3/13/2023  11:15 AM

## 2023-03-13 NOTE — ED TRIAGE NOTES
"Chief Complaint   Patient presents with    Sore Throat     All symptoms X 3 days, pt states son has a cough but no other known sick contacts, pt is not vaccinated against flu/covid    Headache    Chills    Body Aches    Vomiting     Pt ambulatory to triage for above complaints, VSS on RA, GS 15, NAD.    Pt returned to [facility]. Educated on triage process and to inform staff of any changes.     /77   Pulse 79   Temp 36.1 °C (97 °F) (Temporal)   Resp 16   Ht 1.753 m (5' 9\")   Wt 93.4 kg (205 lb 14.6 oz)   SpO2 97%   BMI 30.41 kg/m²     "

## 2023-03-21 NOTE — PROGRESS NOTES
[address]( [phone])  calling in a refill for pt and asking that we  call [facility] to confirm refill either by phone or via fax   [phone]  Fax [phone]    gabapentin (NEURONTIN) 300 MG capsule ()    LOV 23  FOV 23 Subjective:      Chief Complaint   Patient presents with   • Fall     NEW WC pt fell getting out of truck, Righ ankle, right knee, right elbow, right wrist and right side of head, pt is dizzy              DOI:   1/11    Fall   The fall occurred while getting out of his truck - slipped and fell.   . Pt fell from a height of 3 to 5 ft. Pt landed on hard ground. There was no blood loss. Point of impact:  Right outstretched hand, rt elbow and he rolled his rt ankle.   Pain 4/10, constant, throbbing, worse to the touch over rt elbow, wrist and ankle.     Pertinent negatives include no fever, hematuria, loss of consciousness, tingling or visual change. Pt has tried nothing for the symptoms.         Social History   Substance Use Topics   • Smoking status: Never Smoker    • Smokeless tobacco: Not on file   • Alcohol Use: No         Past Medical History   Diagnosis Date   • Colon polyps    • Colon cancer 2011   • MRSA (methicillin resistant Staphylococcus aureus) 05/2011   • Indigestion    • Cancer 2011     colon   • Other specified disorder of intestines      J Pouch   • Dental disorder      right upper broken tooth   • Snoring      sleep apnea questionairre completed   • Heart burn    • Pain      right knee         Current Outpatient Prescriptions on File Prior to Visit   Medication Sig Dispense Refill   • oxycodone-acetaminophen (PERCOCET) 5-325 MG Tab Take 1-2 Tabs by mouth every 6 hours as needed (pain). 10 Tab 0   • oxycodone-acetaminophen (PERCOCET) 5-325 MG Tab Take 1-2 Tabs by mouth every 6 hours as needed (pain). 12 Tab 0   • ibuprofen (MOTRIN) 600 MG Tab Take 1 Tab by mouth every 6 hours as needed. 30 Tab 3   • oxycodone-acetaminophen (PERCOCET) 5-325 MG Tab Take 1 Tab by mouth every four hours as needed. 10 Tab 0   • acetaminophen (TYLENOL) 325 MG TABS Take 650 mg by mouth every four hours as needed.       No current facility-administered medications on file prior to visit.              Review of Systems    Constitutional: Negative for fever.   Genitourinary: Negative for hematuria.   Neurological: Negative for tingling and loss of consciousness.          Objective:     Blood pressure 120/80, pulse 82, temperature 36.4 °C (97.5 °F), resp. rate 16, SpO2 97 %.    Physical Exam   Constitutional: She is oriented to person, place, and time. Pt appears well-developed and well-nourished. No distress.   HENT:   Head: Normocephalic and atraumatic.   Eyes: Conjunctivae are normal.   Cardiovascular: Normal rate, regular rhythm and normal heart sounds.    Pulmonary/Chest: Effort normal and breath sounds normal. No respiratory distress. Pt has no wheezes.   Musculoskeletal:   Right ankle - tender over lateral malleolus.   No bruising, minimal swelling.  Full ROM  Right wrist - tender over scaphoid.   Full ROM, no edema.  Neurovascularly intact  Right elbow - tender over olecranon process.   No bruising, abrasions, or edema.   Neurological: pt is alert and oriented to person, place, and time. No cranial nerve deficit.   Skin: Skin is warm. Pt is not diaphoretic. No erythema.   Nursing note and vitals reviewed.         Narrative        1/11/2017 11:57 AM    HISTORY/REASON FOR EXAM:  Pain/Deformity Following Trauma  Right elbow injury. Fall.    TECHNIQUE/EXAM DESCRIPTION AND NUMBER OF VIEWS:  3 views of the RIGHT elbow.    COMPARISON: None    FINDINGS:    No acute fracture or dislocation. No elbow joint effusion.    No joint arthropathy.         Impression          No acute osseous abnormality.         Reading Provider Reading Date     Frank Soliman M.D. Jan 11, 2017         Signing Provider Signing Date Signing Time     Frank Soliman M.D. Jan 11, 2017 12:31 PM              Narrative        1/11/2017 11:57 AM    HISTORY/REASON FOR EXAM:  pain  Pain/Deformity Following Trauma  Right wrist injury. Fall.    TECHNIQUE/EXAM DESCRIPTION AND NUMBER OF VIEWS:  4 views of the RIGHT wrist.    COMPARISON: 10/4/2016    FINDINGS:    No acute  fracture or dislocation. The carpal rows appear intact.    No joint arthropathy.         Impression          No acute osseous abnormality.         Reading Provider Reading Date     Frank Soliman M.D. Jan 11, 2017         Signing Provider Signing Date Signing Time     Frank Soliman M.D. Jan 11, 2017 12:32 PM       Assessment/Plan:       1. Wrist sprain, right, initial encounter  2. Contusion of right elbow, initial encounter  3. Sprain of right ankle, unspecified ligament, initial encounter       RICE protocol  xrays personally reviewed - no fracture.     Work restrictions per D39    F/u 3-5 d

## 2023-04-16 ENCOUNTER — HOSPITAL ENCOUNTER (OUTPATIENT)
Facility: MEDICAL CENTER | Age: 33
End: 2023-04-18
Attending: EMERGENCY MEDICINE | Admitting: INTERNAL MEDICINE
Payer: MEDICAID

## 2023-04-16 ENCOUNTER — APPOINTMENT (OUTPATIENT)
Dept: RADIOLOGY | Facility: MEDICAL CENTER | Age: 33
End: 2023-04-16
Attending: EMERGENCY MEDICINE
Payer: MEDICAID

## 2023-04-16 DIAGNOSIS — K92.1 HEMATOCHEZIA: ICD-10-CM

## 2023-04-16 DIAGNOSIS — K92.2 GASTROINTESTINAL HEMORRHAGE, UNSPECIFIED GASTROINTESTINAL HEMORRHAGE TYPE: ICD-10-CM

## 2023-04-16 LAB
ALBUMIN SERPL BCP-MCNC: 4.4 G/DL (ref 3.2–4.9)
ALBUMIN/GLOB SERPL: 1.8 G/DL
ALP SERPL-CCNC: 78 U/L (ref 30–99)
ALT SERPL-CCNC: 22 U/L (ref 2–50)
ANION GAP SERPL CALC-SCNC: 13 MMOL/L (ref 7–16)
APPEARANCE UR: CLEAR
AST SERPL-CCNC: 19 U/L (ref 12–45)
BASOPHILS # BLD AUTO: 0.5 % (ref 0–1.8)
BASOPHILS # BLD: 0.04 K/UL (ref 0–0.12)
BILIRUB SERPL-MCNC: 0.3 MG/DL (ref 0.1–1.5)
BILIRUB UR QL STRIP.AUTO: NEGATIVE
BUN SERPL-MCNC: 16 MG/DL (ref 8–22)
CALCIUM ALBUM COR SERPL-MCNC: 9.1 MG/DL (ref 8.5–10.5)
CALCIUM SERPL-MCNC: 9.4 MG/DL (ref 8.5–10.5)
CHLORIDE SERPL-SCNC: 104 MMOL/L (ref 96–112)
CO2 SERPL-SCNC: 22 MMOL/L (ref 20–33)
COLOR UR: YELLOW
CREAT SERPL-MCNC: 0.87 MG/DL (ref 0.5–1.4)
EOSINOPHIL # BLD AUTO: 0.13 K/UL (ref 0–0.51)
EOSINOPHIL NFR BLD: 1.6 % (ref 0–6.9)
ERYTHROCYTE [DISTWIDTH] IN BLOOD BY AUTOMATED COUNT: 41.1 FL (ref 35.9–50)
GFR SERPLBLD CREATININE-BSD FMLA CKD-EPI: 116 ML/MIN/1.73 M 2
GLOBULIN SER CALC-MCNC: 2.4 G/DL (ref 1.9–3.5)
GLUCOSE SERPL-MCNC: 101 MG/DL (ref 65–99)
GLUCOSE UR STRIP.AUTO-MCNC: NEGATIVE MG/DL
HCT VFR BLD AUTO: 42.3 % (ref 42–52)
HGB BLD-MCNC: 14.2 G/DL (ref 14–18)
IMM GRANULOCYTES # BLD AUTO: 0.05 K/UL (ref 0–0.11)
IMM GRANULOCYTES NFR BLD AUTO: 0.6 % (ref 0–0.9)
KETONES UR STRIP.AUTO-MCNC: NEGATIVE MG/DL
LEUKOCYTE ESTERASE UR QL STRIP.AUTO: NEGATIVE
LIPASE SERPL-CCNC: 78 U/L (ref 11–82)
LYMPHOCYTES # BLD AUTO: 2.04 K/UL (ref 1–4.8)
LYMPHOCYTES NFR BLD: 24.5 % (ref 22–41)
MCH RBC QN AUTO: 28.1 PG (ref 27–33)
MCHC RBC AUTO-ENTMCNC: 33.6 G/DL (ref 33.7–35.3)
MCV RBC AUTO: 83.8 FL (ref 81.4–97.8)
MICRO URNS: NORMAL
MONOCYTES # BLD AUTO: 0.76 K/UL (ref 0–0.85)
MONOCYTES NFR BLD AUTO: 9.1 % (ref 0–13.4)
NEUTROPHILS # BLD AUTO: 5.31 K/UL (ref 1.82–7.42)
NEUTROPHILS NFR BLD: 63.7 % (ref 44–72)
NITRITE UR QL STRIP.AUTO: NEGATIVE
NRBC # BLD AUTO: 0 K/UL
NRBC BLD-RTO: 0 /100 WBC
PH UR STRIP.AUTO: 6 [PH] (ref 5–8)
PLATELET # BLD AUTO: 300 K/UL (ref 164–446)
PMV BLD AUTO: 9.3 FL (ref 9–12.9)
POTASSIUM SERPL-SCNC: 3.8 MMOL/L (ref 3.6–5.5)
PROT SERPL-MCNC: 6.8 G/DL (ref 6–8.2)
PROT UR QL STRIP: NEGATIVE MG/DL
RBC # BLD AUTO: 5.05 M/UL (ref 4.7–6.1)
RBC UR QL AUTO: NEGATIVE
SODIUM SERPL-SCNC: 139 MMOL/L (ref 135–145)
SP GR UR STRIP.AUTO: >=1.03
UROBILINOGEN UR STRIP.AUTO-MCNC: 0.2 MG/DL
WBC # BLD AUTO: 8.3 K/UL (ref 4.8–10.8)

## 2023-04-16 PROCEDURE — 700117 HCHG RX CONTRAST REV CODE 255: Performed by: EMERGENCY MEDICINE

## 2023-04-16 PROCEDURE — 83690 ASSAY OF LIPASE: CPT

## 2023-04-16 PROCEDURE — 86850 RBC ANTIBODY SCREEN: CPT

## 2023-04-16 PROCEDURE — 74177 CT ABD & PELVIS W/CONTRAST: CPT

## 2023-04-16 PROCEDURE — 85025 COMPLETE CBC W/AUTO DIFF WBC: CPT

## 2023-04-16 PROCEDURE — 86901 BLOOD TYPING SEROLOGIC RH(D): CPT

## 2023-04-16 PROCEDURE — 99285 EMERGENCY DEPT VISIT HI MDM: CPT

## 2023-04-16 PROCEDURE — 80053 COMPREHEN METABOLIC PANEL: CPT

## 2023-04-16 PROCEDURE — 86900 BLOOD TYPING SEROLOGIC ABO: CPT

## 2023-04-16 PROCEDURE — 36415 COLL VENOUS BLD VENIPUNCTURE: CPT

## 2023-04-16 PROCEDURE — 81003 URINALYSIS AUTO W/O SCOPE: CPT

## 2023-04-16 ASSESSMENT — PAIN DESCRIPTION - PAIN TYPE: TYPE: ACUTE PAIN

## 2023-04-16 ASSESSMENT — FIBROSIS 4 INDEX: FIB4 SCORE: 0.6

## 2023-04-17 PROBLEM — E66.9 OBESITY (BMI 30-39.9): Status: ACTIVE | Noted: 2023-04-17

## 2023-04-17 PROBLEM — R10.31 INTERMITTENT RIGHT LOWER QUADRANT ABDOMINAL PAIN: Status: ACTIVE | Noted: 2019-04-05

## 2023-04-17 PROBLEM — K74.60 CIRRHOSIS (HCC): Status: ACTIVE | Noted: 2023-04-17

## 2023-04-17 PROBLEM — K92.2 LOWER GI BLEEDING: Status: ACTIVE | Noted: 2023-04-17

## 2023-04-17 LAB
ABO GROUP BLD: NORMAL
ALBUMIN SERPL BCP-MCNC: 4 G/DL (ref 3.2–4.9)
ALBUMIN/GLOB SERPL: 1.5 G/DL
ALP SERPL-CCNC: 78 U/L (ref 30–99)
ALT SERPL-CCNC: 22 U/L (ref 2–50)
ANION GAP SERPL CALC-SCNC: 12 MMOL/L (ref 7–16)
AST SERPL-CCNC: 20 U/L (ref 12–45)
BASOPHILS # BLD AUTO: 0.4 % (ref 0–1.8)
BASOPHILS # BLD: 0.03 K/UL (ref 0–0.12)
BILIRUB SERPL-MCNC: 0.3 MG/DL (ref 0.1–1.5)
BLD GP AB SCN SERPL QL: NORMAL
BUN SERPL-MCNC: 15 MG/DL (ref 8–22)
CALCIUM ALBUM COR SERPL-MCNC: 8.8 MG/DL (ref 8.5–10.5)
CALCIUM SERPL-MCNC: 8.8 MG/DL (ref 8.5–10.5)
CHLORIDE SERPL-SCNC: 105 MMOL/L (ref 96–112)
CHOLEST SERPL-MCNC: 199 MG/DL (ref 100–199)
CO2 SERPL-SCNC: 21 MMOL/L (ref 20–33)
CREAT SERPL-MCNC: 0.78 MG/DL (ref 0.5–1.4)
EOSINOPHIL # BLD AUTO: 0.15 K/UL (ref 0–0.51)
EOSINOPHIL NFR BLD: 2.2 % (ref 0–6.9)
ERYTHROCYTE [DISTWIDTH] IN BLOOD BY AUTOMATED COUNT: 41.5 FL (ref 35.9–50)
GFR SERPLBLD CREATININE-BSD FMLA CKD-EPI: 119 ML/MIN/1.73 M 2
GLOBULIN SER CALC-MCNC: 2.6 G/DL (ref 1.9–3.5)
GLUCOSE SERPL-MCNC: 105 MG/DL (ref 65–99)
HCT VFR BLD AUTO: 40.6 % (ref 42–52)
HDLC SERPL-MCNC: 32 MG/DL
HGB BLD-MCNC: 13.8 G/DL (ref 14–18)
IMM GRANULOCYTES # BLD AUTO: 0.04 K/UL (ref 0–0.11)
IMM GRANULOCYTES NFR BLD AUTO: 0.6 % (ref 0–0.9)
INR PPP: 1.04 (ref 0.87–1.13)
LDLC SERPL CALC-MCNC: ABNORMAL MG/DL
LYMPHOCYTES # BLD AUTO: 1.84 K/UL (ref 1–4.8)
LYMPHOCYTES NFR BLD: 26.9 % (ref 22–41)
MCH RBC QN AUTO: 28.8 PG (ref 27–33)
MCHC RBC AUTO-ENTMCNC: 34 G/DL (ref 33.7–35.3)
MCV RBC AUTO: 84.6 FL (ref 81.4–97.8)
MONOCYTES # BLD AUTO: 0.57 K/UL (ref 0–0.85)
MONOCYTES NFR BLD AUTO: 8.3 % (ref 0–13.4)
NEUTROPHILS # BLD AUTO: 4.2 K/UL (ref 1.82–7.42)
NEUTROPHILS NFR BLD: 61.6 % (ref 44–72)
NRBC # BLD AUTO: 0 K/UL
NRBC BLD-RTO: 0 /100 WBC
PLATELET # BLD AUTO: 275 K/UL (ref 164–446)
PMV BLD AUTO: 9.1 FL (ref 9–12.9)
POTASSIUM SERPL-SCNC: 3.9 MMOL/L (ref 3.6–5.5)
PROT SERPL-MCNC: 6.6 G/DL (ref 6–8.2)
PROTHROMBIN TIME: 13.5 SEC (ref 12–14.6)
RBC # BLD AUTO: 4.8 M/UL (ref 4.7–6.1)
RH BLD: NORMAL
SODIUM SERPL-SCNC: 138 MMOL/L (ref 135–145)
TRIGL SERPL-MCNC: 652 MG/DL (ref 0–149)
WBC # BLD AUTO: 6.8 K/UL (ref 4.8–10.8)

## 2023-04-17 PROCEDURE — G0378 HOSPITAL OBSERVATION PER HR: HCPCS

## 2023-04-17 PROCEDURE — 700111 HCHG RX REV CODE 636 W/ 250 OVERRIDE (IP): Performed by: STUDENT IN AN ORGANIZED HEALTH CARE EDUCATION/TRAINING PROGRAM

## 2023-04-17 PROCEDURE — 85025 COMPLETE CBC W/AUTO DIFF WBC: CPT

## 2023-04-17 PROCEDURE — 99252 IP/OBS CONSLTJ NEW/EST SF 35: CPT | Performed by: SPECIALIST

## 2023-04-17 PROCEDURE — 80061 LIPID PANEL: CPT

## 2023-04-17 PROCEDURE — 99223 1ST HOSP IP/OBS HIGH 75: CPT | Mod: GC | Performed by: STUDENT IN AN ORGANIZED HEALTH CARE EDUCATION/TRAINING PROGRAM

## 2023-04-17 PROCEDURE — A9270 NON-COVERED ITEM OR SERVICE: HCPCS | Performed by: STUDENT IN AN ORGANIZED HEALTH CARE EDUCATION/TRAINING PROGRAM

## 2023-04-17 PROCEDURE — 700111 HCHG RX REV CODE 636 W/ 250 OVERRIDE (IP): Performed by: NURSE PRACTITIONER

## 2023-04-17 PROCEDURE — 700102 HCHG RX REV CODE 250 W/ 637 OVERRIDE(OP): Performed by: STUDENT IN AN ORGANIZED HEALTH CARE EDUCATION/TRAINING PROGRAM

## 2023-04-17 PROCEDURE — C9113 INJ PANTOPRAZOLE SODIUM, VIA: HCPCS | Performed by: STUDENT IN AN ORGANIZED HEALTH CARE EDUCATION/TRAINING PROGRAM

## 2023-04-17 PROCEDURE — 85610 PROTHROMBIN TIME: CPT

## 2023-04-17 PROCEDURE — 96376 TX/PRO/DX INJ SAME DRUG ADON: CPT

## 2023-04-17 PROCEDURE — 700105 HCHG RX REV CODE 258: Performed by: FAMILY MEDICINE

## 2023-04-17 PROCEDURE — 96375 TX/PRO/DX INJ NEW DRUG ADDON: CPT

## 2023-04-17 PROCEDURE — 96374 THER/PROPH/DIAG INJ IV PUSH: CPT

## 2023-04-17 PROCEDURE — 80053 COMPREHEN METABOLIC PANEL: CPT

## 2023-04-17 RX ORDER — AMOXICILLIN 250 MG
2 CAPSULE ORAL 2 TIMES DAILY
Status: DISCONTINUED | OUTPATIENT
Start: 2023-04-17 | End: 2023-04-18 | Stop reason: HOSPADM

## 2023-04-17 RX ORDER — ACETAMINOPHEN 500 MG
1000 TABLET ORAL EVERY 6 HOURS PRN
COMMUNITY
End: 2023-08-10

## 2023-04-17 RX ORDER — OXYCODONE HYDROCHLORIDE 5 MG/1
5 TABLET ORAL EVERY 4 HOURS PRN
Status: DISCONTINUED | OUTPATIENT
Start: 2023-04-17 | End: 2023-04-18 | Stop reason: HOSPADM

## 2023-04-17 RX ORDER — POLYETHYLENE GLYCOL 3350 17 G/17G
1 POWDER, FOR SOLUTION ORAL
Status: DISCONTINUED | OUTPATIENT
Start: 2023-04-17 | End: 2023-04-18 | Stop reason: HOSPADM

## 2023-04-17 RX ORDER — PROCHLORPERAZINE EDISYLATE 5 MG/ML
5-10 INJECTION INTRAMUSCULAR; INTRAVENOUS EVERY 4 HOURS PRN
Status: DISCONTINUED | OUTPATIENT
Start: 2023-04-17 | End: 2023-04-18 | Stop reason: HOSPADM

## 2023-04-17 RX ORDER — ONDANSETRON 2 MG/ML
4 INJECTION INTRAMUSCULAR; INTRAVENOUS EVERY 4 HOURS PRN
Status: DISCONTINUED | OUTPATIENT
Start: 2023-04-17 | End: 2023-04-18 | Stop reason: HOSPADM

## 2023-04-17 RX ORDER — MORPHINE SULFATE 4 MG/ML
INJECTION INTRAVENOUS
Status: DISPENSED
Start: 2023-04-17 | End: 2023-04-18

## 2023-04-17 RX ORDER — ACETAMINOPHEN 325 MG/1
650 TABLET ORAL EVERY 4 HOURS PRN
Status: DISCONTINUED | OUTPATIENT
Start: 2023-04-17 | End: 2023-04-18 | Stop reason: HOSPADM

## 2023-04-17 RX ORDER — PANTOPRAZOLE SODIUM 40 MG/10ML
40 INJECTION, POWDER, LYOPHILIZED, FOR SOLUTION INTRAVENOUS DAILY
Status: DISCONTINUED | OUTPATIENT
Start: 2023-04-17 | End: 2023-04-18 | Stop reason: HOSPADM

## 2023-04-17 RX ORDER — SODIUM CHLORIDE, SODIUM LACTATE, POTASSIUM CHLORIDE, CALCIUM CHLORIDE 600; 310; 30; 20 MG/100ML; MG/100ML; MG/100ML; MG/100ML
INJECTION, SOLUTION INTRAVENOUS CONTINUOUS
Status: DISCONTINUED | OUTPATIENT
Start: 2023-04-17 | End: 2023-04-18

## 2023-04-17 RX ORDER — PROMETHAZINE HYDROCHLORIDE 25 MG/1
12.5-25 TABLET ORAL EVERY 4 HOURS PRN
Status: DISCONTINUED | OUTPATIENT
Start: 2023-04-17 | End: 2023-04-18 | Stop reason: HOSPADM

## 2023-04-17 RX ORDER — OXYCODONE HYDROCHLORIDE 5 MG/1
5 TABLET ORAL EVERY 4 HOURS PRN
Status: DISCONTINUED | OUTPATIENT
Start: 2023-04-17 | End: 2023-04-17

## 2023-04-17 RX ORDER — ONDANSETRON 4 MG/1
4 TABLET, ORALLY DISINTEGRATING ORAL EVERY 4 HOURS PRN
Status: DISCONTINUED | OUTPATIENT
Start: 2023-04-17 | End: 2023-04-18 | Stop reason: HOSPADM

## 2023-04-17 RX ORDER — MORPHINE SULFATE 4 MG/ML
2 INJECTION INTRAVENOUS EVERY 4 HOURS PRN
Status: DISCONTINUED | OUTPATIENT
Start: 2023-04-17 | End: 2023-04-18 | Stop reason: HOSPADM

## 2023-04-17 RX ORDER — HYDRALAZINE HYDROCHLORIDE 20 MG/ML
10 INJECTION INTRAMUSCULAR; INTRAVENOUS EVERY 4 HOURS PRN
Status: DISCONTINUED | OUTPATIENT
Start: 2023-04-17 | End: 2023-04-18 | Stop reason: HOSPADM

## 2023-04-17 RX ORDER — PROMETHAZINE HYDROCHLORIDE 25 MG/1
12.5-25 SUPPOSITORY RECTAL EVERY 4 HOURS PRN
Status: DISCONTINUED | OUTPATIENT
Start: 2023-04-17 | End: 2023-04-18 | Stop reason: HOSPADM

## 2023-04-17 RX ORDER — BISACODYL 10 MG
10 SUPPOSITORY, RECTAL RECTAL
Status: DISCONTINUED | OUTPATIENT
Start: 2023-04-17 | End: 2023-04-18 | Stop reason: HOSPADM

## 2023-04-17 RX ADMIN — MORPHINE SULFATE 2 MG: 4 INJECTION INTRAVENOUS at 13:34

## 2023-04-17 RX ADMIN — SODIUM CHLORIDE, POTASSIUM CHLORIDE, SODIUM LACTATE AND CALCIUM CHLORIDE: 600; 310; 30; 20 INJECTION, SOLUTION INTRAVENOUS at 07:34

## 2023-04-17 RX ADMIN — OXYCODONE 5 MG: 5 TABLET ORAL at 01:52

## 2023-04-17 RX ADMIN — PANTOPRAZOLE SODIUM 40 MG: 40 INJECTION, POWDER, FOR SOLUTION INTRAVENOUS at 05:47

## 2023-04-17 RX ADMIN — OXYCODONE 5 MG: 5 TABLET ORAL at 10:58

## 2023-04-17 RX ADMIN — SODIUM CHLORIDE, POTASSIUM CHLORIDE, SODIUM LACTATE AND CALCIUM CHLORIDE: 600; 310; 30; 20 INJECTION, SOLUTION INTRAVENOUS at 19:26

## 2023-04-17 RX ADMIN — MORPHINE SULFATE 2 MG: 4 INJECTION INTRAVENOUS at 19:23

## 2023-04-17 RX ADMIN — IOHEXOL 100 ML: 350 INJECTION, SOLUTION INTRAVENOUS at 00:15

## 2023-04-17 ASSESSMENT — LIFESTYLE VARIABLES
AVERAGE NUMBER OF DAYS PER WEEK YOU HAVE A DRINK CONTAINING ALCOHOL: 7
HAVE YOU EVER FELT YOU SHOULD CUT DOWN ON YOUR DRINKING: NO
TOTAL SCORE: 0
DOES PATIENT WANT TO STOP DRINKING: NO
TOTAL SCORE: 0
SUBSTANCE_ABUSE: 0
SUBSTANCE_ABUSE: 1
TOTAL SCORE: 0
EVER HAD A DRINK FIRST THING IN THE MORNING TO STEADY YOUR NERVES TO GET RID OF A HANGOVER: NO
TOTAL SCORE: 0
TOTAL SCORE: 0
EVER HAD A DRINK FIRST THING IN THE MORNING TO STEADY YOUR NERVES TO GET RID OF A HANGOVER: NO
CONSUMPTION TOTAL: NEGATIVE
ALCOHOL_USE: YES
HAVE PEOPLE ANNOYED YOU BY CRITICIZING YOUR DRINKING: NO
HAVE PEOPLE ANNOYED YOU BY CRITICIZING YOUR DRINKING: NO
ON A TYPICAL DAY WHEN YOU DRINK ALCOHOL HOW MANY DRINKS DO YOU HAVE: 1
TOTAL SCORE: 0
EVER FELT BAD OR GUILTY ABOUT YOUR DRINKING: NO
HOW MANY TIMES IN THE PAST YEAR HAVE YOU HAD 5 OR MORE DRINKS IN A DAY: 6
CONSUMPTION TOTAL: POSITIVE
HAVE YOU EVER FELT YOU SHOULD CUT DOWN ON YOUR DRINKING: NO
AVERAGE NUMBER OF DAYS PER WEEK YOU HAVE A DRINK CONTAINING ALCOHOL: 0
DOES PATIENT WANT TO STOP DRINKING: NO
ON A TYPICAL DAY WHEN YOU DRINK ALCOHOL HOW MANY DRINKS DO YOU HAVE: 0
ALCOHOL_USE: NO
HOW MANY TIMES IN THE PAST YEAR HAVE YOU HAD 5 OR MORE DRINKS IN A DAY: 0
EVER FELT BAD OR GUILTY ABOUT YOUR DRINKING: NO

## 2023-04-17 ASSESSMENT — ENCOUNTER SYMPTOMS
SHORTNESS OF BREATH: 0
ABDOMINAL PAIN: 1
WEAKNESS: 1
BLOOD IN STOOL: 1
NAUSEA: 0
COUGH: 0
MYALGIAS: 0
HEADACHES: 0
MYALGIAS: 1
RESPIRATORY NEGATIVE: 1
DOUBLE VISION: 0
EYES NEGATIVE: 1
FEVER: 0
CHILLS: 0
BLURRED VISION: 0
DIZZINESS: 0
CARDIOVASCULAR NEGATIVE: 1
WEAKNESS: 0
VOMITING: 0

## 2023-04-17 ASSESSMENT — PAIN DESCRIPTION - PAIN TYPE
TYPE: ACUTE PAIN

## 2023-04-17 ASSESSMENT — PATIENT HEALTH QUESTIONNAIRE - PHQ9
SUM OF ALL RESPONSES TO PHQ9 QUESTIONS 1 AND 2: 0
SUM OF ALL RESPONSES TO PHQ9 QUESTIONS 1 AND 2: 0
2. FEELING DOWN, DEPRESSED, IRRITABLE, OR HOPELESS: NOT AT ALL
1. LITTLE INTEREST OR PLEASURE IN DOING THINGS: NOT AT ALL
1. LITTLE INTEREST OR PLEASURE IN DOING THINGS: NOT AT ALL
2. FEELING DOWN, DEPRESSED, IRRITABLE, OR HOPELESS: NOT AT ALL

## 2023-04-17 ASSESSMENT — FIBROSIS 4 INDEX: FIB4 SCORE: 0.46

## 2023-04-17 NOTE — ED NOTES
Checked on bed, connected to monitor,  with unlabored respirations. Gurney in low position, side rail up for pt safety. Call light within reach. Will continue to monitor

## 2023-04-17 NOTE — ASSESSMENT & PLAN NOTE
Status post colonoscopy 2 weeks ago.  Patient remarks intermittent rectal bleeding.  Hemodynamically stable (Hgb 14.1 ~baseline)  - N.p.o. for potential scoping in a.m.  - IVF  - Type and cross  - Pantoprazole IV  - H&H at 7 AM  - GI consulted, Dr. Torres; anticipate flex sig today

## 2023-04-17 NOTE — ED NOTES
Pt ambulated to Red 3 from lobby with steady gait.  On monitor, call light in reach. Chart up for ERP. Accompanied by daughter.
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
## 2023-04-17 NOTE — PROGRESS NOTES
4 Eyes Skin Assessment Completed by CHRISTINA Carballo and CHRISTINA Hector.    Head WDL  Ears WDL  Nose WDL  Mouth WDL  Neck WDL  Breast/Chest WDL  Shoulder Blades WDL  Spine WDL  (R) Arm/Elbow/Hand WDL  (L) Arm/Elbow/Hand WDL  Abdomen WDL  Groin WDL  Scrotum/Coccyx/Buttocks WDL  (R) Leg WDL  (L) Leg WDL  (R) Heel/Foot/Toe WDL  (L) Heel/Foot/Toe WDL          Devices In Places Pulse Ox      Interventions In Place N/A    Possible Skin Injury No    Pictures Uploaded Into Epic N/A  Wound Consult Placed N/A  RN Wound Prevention Protocol Ordered No

## 2023-04-17 NOTE — PROGRESS NOTES
[facility] Medicine Daily Progress Note    Date of Service  4/17/2023    Chief Complaint  Eddie Daniel is a 32 y.o. male admitted 4/16/2023 with rectal bleeding and abdominal pain    Hospital Course  Mr. Eddie Daniel is a 32 y.o. male who presented 4/16/2023 with past medical history of familial adenomatous polyposis status post 4 ileostomies (last last done 2012) comes to the ED on 4/17/2023 due to intermittent rectal bleed status post colonoscopy 2 weeks ago.      Patient denies contacting gastroenterologist status post procedure.  Patient also reports his head minimal bleeding per rectum since his colonoscopy was done approximately 2 weeks ago and has not slowed down.  Patient remarks he has had to change his underwear at least twice daily; remarks some right lower quadrant pain, 6/10, not alleviated by anything, not exacerbated by anything, no radiation.  Denies fever, chills, nausea, vomiting, chest pain, palpitations, shortness of breath, falls, trauma.     In the ED, patient AAOx4, no acute distress.  Vital signs stable.  BC unremarkable (hemoglobin 14.2; 14.5).  CHEM panel unremarkable.  UA unremarkable CT of the abdomen/pelvis demonstrated hepatomegaly and irregular hepatic contour fibrin changes of cirrhosis; fat-containing periumbilical hernia, stable linear hyperdensity tracking posterior to the right psoas muscle.  Patient will be admitted for lower GI bleed.    GI team, Dr. Torres has been consulted for management of lower GI bleed.  Records were requested from GI consultants from his last colonoscopy on 3/30/2023 with Dr. Blackman. Polyps were obtained with noted pathology results of NEG H. Pylori, NEG to atypia and malignancy, NEG for high grade dysplasia.  Plan for flex sig today with GI 4/17/2023.          Interval Problem Update  -Patient seen and examined.  Patient still reports some right lower quadrant pain and tenderness on palpation.  Patient agreeable with flex sig today with GI  team.  Continue IV fluids, keep patient n.p.o.  -Plan of care: Plan for flex sig this afternoon with GI team, Dr. Torres; continue n.p.o. status continue IV fluids  -Disposition: Anticipated to stay overnight under observation until flex sig has been done and further recommendations from GI has been made  -Lab work: reviewed; expected  -VSS at this time    I have discussed this patient's plan of care and discharge plan at IDT rounds today with Case Management, Nursing, Nursing leadership, and other members of the IDT team.    Consultants/Specialty  GI -- Dr. Torres    Code Status  Full Code    Disposition  Patient is medically cleared for discharge.   Anticipate discharge to to home with close outpatient follow-up.  I have placed the appropriate orders for post-discharge needs.    Review of Systems  Review of Systems   Constitutional:  Positive for malaise/fatigue. Negative for chills and fever.   HENT: Negative.     Eyes: Negative.    Respiratory: Negative.     Cardiovascular: Negative.    Gastrointestinal:  Positive for abdominal pain, blood in stool and melena.   Genitourinary: Negative.    Musculoskeletal:  Positive for myalgias.   Skin: Negative.    Neurological:  Positive for weakness.   Endo/Heme/Allergies: Negative.    Psychiatric/Behavioral:  Positive for substance abuse.       Physical Exam  Temp:  [36.4 °C (97.6 °F)-36.8 °C (98.2 °F)] 36.6 °C (97.9 °F)  Pulse:  [55-82] 55  Resp:  [16-18] 18  BP: (105-132)/(59-80) 123/71  SpO2:  [92 %-97 %] 92 %    Physical Exam  Vitals and nursing note reviewed.   Constitutional:       Appearance: He is obese.   HENT:      Head: Normocephalic.      Nose: Nose normal.      Mouth/Throat:      Mouth: Mucous membranes are moist.      Pharynx: Oropharynx is clear.   Eyes:      Pupils: Pupils are equal, round, and reactive to light.   Cardiovascular:      Rate and Rhythm: Normal rate and regular rhythm.      Pulses: Normal pulses.      Heart sounds: Normal heart sounds.    Pulmonary:      Effort: Pulmonary effort is normal.      Breath sounds: Normal breath sounds.   Abdominal:      General: Bowel sounds are normal.      Palpations: Abdomen is soft.      Tenderness: There is abdominal tenderness. There is guarding.   Musculoskeletal:         General: Tenderness present.      Cervical back: Normal range of motion and neck supple.   Skin:     General: Skin is dry.      Capillary Refill: Capillary refill takes 2 to 3 seconds.   Neurological:      Mental Status: He is alert. Mental status is at baseline.       Fluids  No intake or output data in the 24 hours ending 04/17/23 1155    Laboratory  Recent Labs     04/16/23  2300 04/17/23  0315   WBC 8.3 6.8   RBC 5.05 4.80   HEMOGLOBIN 14.2 13.8*   HEMATOCRIT 42.3 40.6*   MCV 83.8 84.6   MCH 28.1 28.8   MCHC 33.6* 34.0   RDW 41.1 41.5   PLATELETCT 300 275   MPV 9.3 9.1     Recent Labs     04/16/23  2300 04/17/23  0315   SODIUM 139 138   POTASSIUM 3.8 3.9   CHLORIDE 104 105   CO2 22 21   GLUCOSE 101* 105*   BUN 16 15   CREATININE 0.87 0.78   CALCIUM 9.4 8.8             Recent Labs     04/17/23  0315   TRIGLYCERIDE 652*   HDL 32*   LDL see below       Imaging  CT-ABDOMEN-PELVIS WITH   Final Result         1.  Hepatomegaly and irregular hepatic contour favoring changes of cirrhosis   2.  Stable linear hyperdensity tracking posterior to the right psoas muscle, of uncertain significance but overall stable to at least 2011 cm favoring benign etiology.   3.  Fat-containing periumbilical hernia           Assessment/Plan  * Lower GI bleeding- (present on admission)  Assessment & Plan  Status post colonoscopy 2 weeks ago.  Patient remarks intermittent rectal bleeding.  Hemodynamically stable (Hgb 14.1 ~baseline)  - N.p.o. for potential scoping in a.m.  - IVF  - Type and cross  - Pantoprazole IV  - H&H at 7 AM  - GI consulted, Dr. Torres; anticipate flex sig today    Obesity (BMI 30-39.9)  Assessment & Plan  - Recommend outpatient  management    Cirrhosis (HCC)  Assessment & Plan  Most likely secondary to alcohol use; found on CT abdomen pelvis 4/17/2023  - Recommend alcohol cessation    Alcohol use- (present on admission)  Assessment & Plan  Remarks 1 beer daily  - Alcohol cessation recommended    History of colon cancer- (present on admission)  Assessment & Plan  - Secondary to familial adenomatous polyposis? This was noted in patient's hx    Familial polyposis of colon- (present on admission)  Assessment & Plan  -Status post several ileostomies.  Patient recently got colonoscopy 2 weeks ago for follow-up.  -Followed [facility] - last colonoscopy with Dr. Blackman on 3/30/2023    Intermittent right lower quadrant abdominal pain- (present on admission)  Assessment & Plan  Right lower quadrant pain.  CT abdomen/pelvis demonstrated cirrhosis.  - No NSAIDs  - Tylenol and Oxy as needed for pain  - Assess in a.m.         VTE prophylaxis: SCDs/TEDs    I have performed a physical exam and reviewed and updated ROS and Plan today (4/17/2023). In review of yesterday's note (4/16/2023), there are no changes except as documented above.    I had spent 20 minutes assessing, examining, and discussing plan of care with patient.    ====================================================================================================================================================================================================================================================================  Please note that this dictation was created using voice recognition software. I have made every reasonable attempt to correct obvious errors, but there may be errors of grammar and possibly content that I did not discover before finalizing the note.    Electronically signed by:  Dr. TREVA Bello, DNP, APRN, FNP-C  Hospitalist Services  [facility]  [phone]  [email or web address]  04/17/23                  [id number]

## 2023-04-17 NOTE — H&P
[facility] Internal Medicine History & Physical Note    Date of Service  4/17/2023    [facility] Team: EZEKIEL   Attending: Brian Cox M.d.  Senior Resident: Dr. Eliane Caceres  Contact Number: [phone]    Primary Care Physician  Pcp Pt States None    Consultants  None    Specialist Names: None    Code Status  Full Code    Chief Complaint  Chief Complaint   Patient presents with    Rectal Bleeding     Coloscopy done 2 weeks ago, has had rectal bleeding since then. Bright red blood noted with small clots. Hx of colon cancer.     Abdominal Pain     RLQ pain since Friday, 8/10 sharp burning, cramping pain. +nausea -appendix        History of Presenting Illness (HPI):  Eddie Daniel is a 32 y.o. male who presented 4/16/2023 with past medical history of familial adenomatous polyposis status post 4 ileostomies (last last done 2012) comes to the ED due to intermittent rectal bleed status post colonoscopy 2 weeks ago.  Patient denies contacting gastroenterologist status post procedure.  Patient remarks he has had to change his underwear at least twice daily; remarks some right lower quadrant pain, 6/10, not alleviated by anything, not exacerbated by anything, no radiation.  Denies fever, chills, nausea, vomiting, chest pain, palpitations, shortness of breath, falls, trauma.    In the ED, patient AAOx4, no acute distress.  Vital signs stable.  BC unremarkable (hemoglobin 14.2; 14.5).  CHEM panel unremarkable.  UA unremarkable CT of the abdomen/pelvis demonstrated hepatomegaly and irregular hepatic contour fibrin changes of cirrhosis; fat-containing periumbilical hernia, stable linear hyperdensity tracking posterior to the right psoas muscle.  Patient will be admitted for lower GI bleed.    I discussed the plan of care with patient.    Review of Systems  Review of Systems   Constitutional:  Negative for chills, fever and malaise/fatigue.   Eyes:  Negative for blurred vision and double vision.   Respiratory:  Negative for  cough and shortness of breath.    Cardiovascular:  Negative for chest pain and leg swelling.   Gastrointestinal:  Positive for abdominal pain (Right lower quadrant) and blood in stool. Negative for nausea and vomiting.   Genitourinary:  Negative for dysuria.   Musculoskeletal:  Negative for myalgias.   Skin:  Negative for rash.   Neurological:  Negative for dizziness, weakness and headaches.   Psychiatric/Behavioral:  Negative for substance abuse.      Past Medical History   has a past medical history of Blood clotting disorder ([facility]) (2014), Cancer ([facility]) (dx 2010), Colon cancer ([facility]) (2011), Colon polyps, Dental disorder, Heart burn, Indigestion, Migraine, MRSA (methicillin resistant Staphylococcus aureus) (05/2011), Other specified disorder of intestines, Pain, Right ankle pain (08/03/2020), Seasonal allergies, and Snoring.    Surgical History   has a past surgical history that includes pr repair bladder wound/inj,complic (2007); arthroscopy, knee (Right, 02/28/2014); ileostomy (3/8/2011); colectomy (3/8/2011); ileostomy (5/3/2011); exploratory laparotomy (5/23/2011); ileostomy (5/23/2011); ileostomy (4/3/2012); knee arthrotomy (2007); knee arthroscopy (11/6/2014); open osteochondral allograft (11/6/2014); knee arthroscopy (Right, 2014); shoulder arthroscopy w/ rotator cuff repair (Right, 2018); tonsillectomy (06/2020); achilles tendon repair (Right, 8/6/2020); exostosis excision (Right, 8/6/2020); and tendon transfer (Right, 8/6/2020).     Family History  family history includes Cancer in his father, maternal grandmother, paternal grandmother, and another family member; Diabetes in his paternal grandmother.   Family history reviewed with patient.     Social History  Tobacco: Denies  Alcohol: 1 beer daily  Recreational drugs (illegal or prescription): Denies  Employment:   Living Situation: Wife and kids  Recent Travel: Denies  Primary Care Provider: Reviewed    Other (stressors, spirituality, exposures):  Denies    Allergies  Allergies   Allergen Reactions    Hydrocodone-Acetaminophen Rash and Vomiting     Rash on neck and nausea/vomiting.    Tomato Hives     Fresh only       Medications  Prior to Admission Medications   Prescriptions Last Dose Informant Patient Reported? Taking?   cyclobenzaprine (FLEXERIL) 5 mg tablet   No No   Sig: Take 1-2 Tablets by mouth at bedtime as needed for Muscle Spasms.   methylPREDNISolone (MEDROL DOSEPAK) 4 MG Tablet Therapy Pack   No No   Sig: Follow schedule on package instructions.   ondansetron (ZOFRAN ODT) 4 MG TABLET DISPERSIBLE   No No   Sig: Take 1 Tablet by mouth every 6 hours as needed for Nausea/Vomiting.      Facility-Administered Medications: None       Physical Exam  Temp:  [36.5 °C (97.7 °F)-36.8 °C (98.2 °F)] 36.5 °C (97.7 °F)  Pulse:  [68-82] 76  Resp:  [16-18] 16  BP: (105-132)/(59-80) 119/67  SpO2:  [93 %-97 %] 93 %  Blood Pressure: 105/59   Temperature: 36.7 °C (98 °F)   Pulse: 68   Respiration: 18   Pulse Oximetry: 94 %       Physical Exam  Vitals and nursing note reviewed.   Constitutional:       General: He is not in acute distress.     Appearance: Normal appearance. He is obese.   HENT:      Head: Normocephalic.      Nose: Nose normal.      Mouth/Throat:      Mouth: Mucous membranes are dry.   Eyes:      Extraocular Movements: Extraocular movements intact.      Pupils: Pupils are equal, round, and reactive to light.   Cardiovascular:      Rate and Rhythm: Normal rate and regular rhythm.      Pulses: Normal pulses.      Heart sounds: Normal heart sounds. No murmur heard.  Pulmonary:      Effort: Pulmonary effort is normal. No respiratory distress.      Breath sounds: Normal breath sounds.   Chest:      Chest wall: No tenderness.   Abdominal:      General: Bowel sounds are normal. There is no distension.      Palpations: Abdomen is soft.      Tenderness: There is no abdominal tenderness.   Genitourinary:     Rectum: Normal.   Musculoskeletal:      Right lower leg:  No edema.      Left lower leg: No edema.   Skin:     General: Skin is warm and dry.      Capillary Refill: Capillary refill takes less than 2 seconds.   Neurological:      General: No focal deficit present.      Mental Status: He is alert and oriented to person, place, and time. Mental status is at baseline.   Psychiatric:         Mood and Affect: Mood normal.         Behavior: Behavior normal.       Laboratory:  Recent Labs     04/16/23  2300   WBC 8.3   RBC 5.05   HEMOGLOBIN 14.2   HEMATOCRIT 42.3   MCV 83.8   MCH 28.1   MCHC 33.6*   RDW 41.1   PLATELETCT 300   MPV 9.3     Recent Labs     04/16/23  2300   SODIUM 139   POTASSIUM 3.8   CHLORIDE 104   CO2 22   GLUCOSE 101*   BUN 16   CREATININE 0.87   CALCIUM 9.4     Recent Labs     04/16/23  2300   ALTSGPT 22   ASTSGOT 19   ALKPHOSPHAT 78   TBILIRUBIN 0.3   LIPASE 78   GLUCOSE 101*         No results for input(s): NTPROBNP in the last 72 hours.      No results for input(s): TROPONINT in the last 72 hours.    Imaging:  CT-ABDOMEN-PELVIS WITH   Final Result         1.  Hepatomegaly and irregular hepatic contour favoring changes of cirrhosis   2.  Stable linear hyperdensity tracking posterior to the right psoas muscle, of uncertain significance but overall stable to at least 2011 cm favoring benign etiology.   3.  Fat-containing periumbilical hernia          no X-Ray or EKG requiring interpretation    Assessment/Plan:  Problem Representation:   32-year-old male with a past medical history of FAP status post multiple ileectomy's with recent colonoscopy done 2 weeks ago comes due to intermittent rectal bleed.  Patient did not contact gastroenterologist prior to coming to the ED.  Hemodynamically stable.  Recommend GI consult.    I anticipate this patient is appropriate for observation status at this time because rectal bleed but hemodynamically stable    Patient will need a Med/Surg bed on MEDICAL service .  The need is secondary to rectal bleed but hemodynamically  stable.    * Lower GI bleeding- (present on admission)  Assessment & Plan  Status post colonoscopy 2 weeks ago.  Patient remarks intermittent rectal bleeding.  Hemodynamically stable (Hgb 14.1 ~baseline)  - N.p.o. for potential scoping in a.m.  - IVF  - Type and cross  - Pantoprazole IV  - H&H at 7 AM  - Recommend GI consult for further assessment    Intermittent right lower quadrant abdominal pain- (present on admission)  Assessment & Plan  Right lower quadrant pain.  CT abdomen/pelvis demonstrated cirrhosis.  - No NSAIDs  - Tylenol and Oxy as needed for pain  - Assess in a.m.    Obesity (BMI 30-39.9)  Assessment & Plan  - Recommend outpatient management    Cirrhosis (HCC)  Assessment & Plan  Most likely secondary to alcohol use; found on CT abdomen pelvis 4/17/2023  - Recommend alcohol cessation    Alcohol use- (present on admission)  Assessment & Plan  Remarks 1 beer daily  - Alcohol cessation recommended    History of colon cancer- (present on admission)  Assessment & Plan  Secondary to familial adenomatous polyposis    Familial polyposis of colon- (present on admission)  Assessment & Plan  Status post several ileostomies.  Patient recently got colonoscopy 2 weeks ago for follow-up.  Followed by digestive health.        VTE prophylaxis: SCDs/TEDs

## 2023-04-17 NOTE — ED NOTES
Patient assisted to and from the rest room, ambulated in steady gait, urine specimen collected and sent to lab.

## 2023-04-17 NOTE — ASSESSMENT & PLAN NOTE
Most likely secondary to alcohol use; found on CT abdomen pelvis 4/17/2023  - Recommend alcohol cessation

## 2023-04-17 NOTE — CARE PLAN
The patient is Stable - Low risk of patient condition declining or worsening    Shift Goals  Clinical Goals: Pain management, monitor H&H  Patient Goals: rest, comfort  Family Goals: rest    Progress made toward(s) clinical / shift goals:  gi consult, egd?  Problem: Pain - Standard  Goal: Alleviation of pain or a reduction in pain to the patient’s comfort goal  Outcome: Progressing     Problem: Knowledge Deficit - Standard  Goal: Patient and family/care givers will demonstrate understanding of plan of care, disease process/condition, diagnostic tests and medications  Outcome: Progressing       Patient is not progressing towards the following goals:

## 2023-04-17 NOTE — CARE PLAN
The patient is Stable - Low risk of patient condition declining or worsening    Shift Goals  Clinical Goals: Pain management, monitor H&H  Patient Goals: rest, comfort  Family Goals: rest    Progress made toward(s) clinical / shift goals:    Problem: Pain - Standard  Goal: Alleviation of pain or a reduction in pain to the patient’s comfort goal  Outcome: Progressing  Expected end date: 4/18/2023     Problem: Knowledge Deficit - Standard  Goal: Patient and family/care givers will demonstrate understanding of plan of care, disease process/condition, diagnostic tests and medications  Outcome: Progressing  Expected end date: 4/18/2023

## 2023-04-17 NOTE — ASSESSMENT & PLAN NOTE
Right lower quadrant pain.  CT abdomen/pelvis demonstrated cirrhosis.  - No NSAIDs  - Tylenol and Oxy as needed for pain  - Assess in a.m.

## 2023-04-17 NOTE — HOSPITAL COURSE
Mr. Eddie Daniel is a 32 y.o. male who presented 4/16/2023 with past medical history of familial adenomatous polyposis status post 4 ileostomies (last last done 2012) comes to the ED on 4/17/2023 due to intermittent rectal bleed status post colonoscopy 2 weeks ago.      Patient denies contacting gastroenterologist status post procedure.  Patient also reports his head minimal bleeding per rectum since his colonoscopy was done approximately 2 weeks ago and has not slowed down.  Patient remarks he has had to change his underwear at least twice daily; remarks some right lower quadrant pain, 6/10, not alleviated by anything, not exacerbated by anything, no radiation.  Denies fever, chills, nausea, vomiting, chest pain, palpitations, shortness of breath, falls, trauma.     In the ED, patient AAOx4, no acute distress.  Vital signs stable.  BC unremarkable (hemoglobin 14.2; 14.5).  CHEM panel unremarkable.  UA unremarkable CT of the abdomen/pelvis demonstrated hepatomegaly and irregular hepatic contour fibrin changes of cirrhosis; fat-containing periumbilical hernia, stable linear hyperdensity tracking posterior to the right psoas muscle.  Patient will be admitted for lower GI bleed.    GI team, Dr. Torres has been consulted for management of lower GI bleed.  Records were requested from GI consultants from his last colonoscopy on 3/30/2023 with Dr. Blackman. Polyps were obtained with noted pathology results of NEG H. Pylori, NEG to atypia and malignancy, NEG for high grade dysplasia.  Plan for flex sig today with GI 4/17/2023.

## 2023-04-17 NOTE — ED TRIAGE NOTES
"Chief Complaint   Patient presents with    Rectal Bleeding     Coloscopy done 2 weeks ago, has had rectal bleeding since then. Bright red blood noted with small clots. Hx of colon cancer.     Abdominal Pain     RLQ pain since Friday, 8/10 sharp burning, cramping pain. +nausea -appendix        Pt ambulated with steady gait to triage. Pt A&Ox4, on room air, pt denies taking any medication for pain. Daughter with pt.     Pt to lobby . Pt educated on alerting staff in changes to condition. Pt verbalized understanding. Protocol ordered.     /78   Pulse 82   Temp 36.7 °C (98 °F) (Temporal)   Resp 16   Ht 1.753 m (5' 9\")   Wt 94.8 kg (208 lb 15.9 oz)   SpO2 97%   BMI 30.86 kg/m²     "

## 2023-04-17 NOTE — ASSESSMENT & PLAN NOTE
-Status post several ileostomies.  Patient recently got colonoscopy 2 weeks ago for follow-up.  -Followed [facility] - last colonoscopy with Dr. Blackman on 3/30/2023

## 2023-04-18 ENCOUNTER — ANESTHESIA EVENT (OUTPATIENT)
Dept: SURGERY | Facility: MEDICAL CENTER | Age: 33
End: 2023-04-18
Payer: MEDICAID

## 2023-04-18 ENCOUNTER — ANESTHESIA (OUTPATIENT)
Dept: SURGERY | Facility: MEDICAL CENTER | Age: 33
End: 2023-04-18
Payer: MEDICAID

## 2023-04-18 VITALS
RESPIRATION RATE: 14 BRPM | WEIGHT: 207.01 LBS | TEMPERATURE: 98 F | BODY MASS INDEX: 30.66 KG/M2 | SYSTOLIC BLOOD PRESSURE: 122 MMHG | HEIGHT: 69 IN | OXYGEN SATURATION: 92 % | DIASTOLIC BLOOD PRESSURE: 59 MMHG | HEART RATE: 65 BPM

## 2023-04-18 PROBLEM — R10.31 INTERMITTENT RIGHT LOWER QUADRANT ABDOMINAL PAIN: Status: RESOLVED | Noted: 2019-04-05 | Resolved: 2023-04-18

## 2023-04-18 PROBLEM — K92.2 LOWER GI BLEEDING: Status: RESOLVED | Noted: 2023-04-17 | Resolved: 2023-04-18

## 2023-04-18 LAB
ANION GAP SERPL CALC-SCNC: 10 MMOL/L (ref 7–16)
BUN SERPL-MCNC: 13 MG/DL (ref 8–22)
CALCIUM SERPL-MCNC: 8.8 MG/DL (ref 8.5–10.5)
CHLORIDE SERPL-SCNC: 101 MMOL/L (ref 96–112)
CO2 SERPL-SCNC: 27 MMOL/L (ref 20–33)
CREAT SERPL-MCNC: 1.18 MG/DL (ref 0.5–1.4)
ERYTHROCYTE [DISTWIDTH] IN BLOOD BY AUTOMATED COUNT: 40.9 FL (ref 35.9–50)
GFR SERPLBLD CREATININE-BSD FMLA CKD-EPI: 83 ML/MIN/1.73 M 2
GLUCOSE SERPL-MCNC: 98 MG/DL (ref 65–99)
HCT VFR BLD AUTO: 39.6 % (ref 42–52)
HGB BLD-MCNC: 13.3 G/DL (ref 14–18)
MCH RBC QN AUTO: 28.2 PG (ref 27–33)
MCHC RBC AUTO-ENTMCNC: 33.6 G/DL (ref 33.7–35.3)
MCV RBC AUTO: 83.9 FL (ref 81.4–97.8)
PLATELET # BLD AUTO: 215 K/UL (ref 164–446)
PMV BLD AUTO: 9.2 FL (ref 9–12.9)
POTASSIUM SERPL-SCNC: 3.8 MMOL/L (ref 3.6–5.5)
RBC # BLD AUTO: 4.72 M/UL (ref 4.7–6.1)
SODIUM SERPL-SCNC: 138 MMOL/L (ref 135–145)
WBC # BLD AUTO: 6.1 K/UL (ref 4.8–10.8)

## 2023-04-18 PROCEDURE — 700111 HCHG RX REV CODE 636 W/ 250 OVERRIDE (IP): Performed by: ANESTHESIOLOGY

## 2023-04-18 PROCEDURE — 45382 COLONOSCOPY W/CONTROL BLEED: CPT | Performed by: INTERNAL MEDICINE

## 2023-04-18 PROCEDURE — 160048 HCHG OR STATISTICAL LEVEL 1-5: Performed by: INTERNAL MEDICINE

## 2023-04-18 PROCEDURE — C9113 INJ PANTOPRAZOLE SODIUM, VIA: HCPCS | Performed by: STUDENT IN AN ORGANIZED HEALTH CARE EDUCATION/TRAINING PROGRAM

## 2023-04-18 PROCEDURE — 160035 HCHG PACU - 1ST 60 MINS PHASE I: Performed by: INTERNAL MEDICINE

## 2023-04-18 PROCEDURE — 80048 BASIC METABOLIC PNL TOTAL CA: CPT

## 2023-04-18 PROCEDURE — 700111 HCHG RX REV CODE 636 W/ 250 OVERRIDE (IP): Performed by: NURSE PRACTITIONER

## 2023-04-18 PROCEDURE — 160009 HCHG ANES TIME/MIN: Performed by: INTERNAL MEDICINE

## 2023-04-18 PROCEDURE — 700111 HCHG RX REV CODE 636 W/ 250 OVERRIDE (IP): Performed by: STUDENT IN AN ORGANIZED HEALTH CARE EDUCATION/TRAINING PROGRAM

## 2023-04-18 PROCEDURE — 00811 ANES LWR INTST NDSC NOS: CPT | Performed by: ANESTHESIOLOGY

## 2023-04-18 PROCEDURE — 99140 ANES COMP EMERGENCY COND: CPT | Performed by: ANESTHESIOLOGY

## 2023-04-18 PROCEDURE — 160203 HCHG ENDO MINUTES - 1ST 30 MINS LEVEL 4: Performed by: INTERNAL MEDICINE

## 2023-04-18 PROCEDURE — 96376 TX/PRO/DX INJ SAME DRUG ADON: CPT | Mod: XU

## 2023-04-18 PROCEDURE — 99239 HOSP IP/OBS DSCHRG MGMT >30: CPT | Performed by: HOSPITALIST

## 2023-04-18 PROCEDURE — 160002 HCHG RECOVERY MINUTES (STAT): Performed by: INTERNAL MEDICINE

## 2023-04-18 PROCEDURE — 85027 COMPLETE CBC AUTOMATED: CPT

## 2023-04-18 PROCEDURE — G0378 HOSPITAL OBSERVATION PER HR: HCPCS

## 2023-04-18 PROCEDURE — 700101 HCHG RX REV CODE 250: Performed by: ANESTHESIOLOGY

## 2023-04-18 PROCEDURE — 700101 HCHG RX REV CODE 250: Performed by: SPECIALIST

## 2023-04-18 RX ORDER — ENEMA 19; 7 G/133ML; G/133ML
1 ENEMA RECTAL 2 TIMES DAILY
Status: DISCONTINUED | OUTPATIENT
Start: 2023-04-18 | End: 2023-04-18 | Stop reason: HOSPADM

## 2023-04-18 RX ORDER — SODIUM CHLORIDE, SODIUM LACTATE, POTASSIUM CHLORIDE, CALCIUM CHLORIDE 600; 310; 30; 20 MG/100ML; MG/100ML; MG/100ML; MG/100ML
INJECTION, SOLUTION INTRAVENOUS CONTINUOUS
Status: DISCONTINUED | OUTPATIENT
Start: 2023-04-18 | End: 2023-04-18

## 2023-04-18 RX ORDER — HALOPERIDOL 5 MG/ML
1 INJECTION INTRAMUSCULAR
Status: DISCONTINUED | OUTPATIENT
Start: 2023-04-18 | End: 2023-04-18 | Stop reason: HOSPADM

## 2023-04-18 RX ORDER — EPHEDRINE SULFATE 50 MG/ML
5 INJECTION, SOLUTION INTRAVENOUS
Status: DISCONTINUED | OUTPATIENT
Start: 2023-04-18 | End: 2023-04-18 | Stop reason: HOSPADM

## 2023-04-18 RX ORDER — ALBUTEROL SULFATE 2.5 MG/3ML
2.5 SOLUTION RESPIRATORY (INHALATION)
Status: DISCONTINUED | OUTPATIENT
Start: 2023-04-18 | End: 2023-04-18 | Stop reason: HOSPADM

## 2023-04-18 RX ORDER — LABETALOL HYDROCHLORIDE 5 MG/ML
5 INJECTION, SOLUTION INTRAVENOUS
Status: DISCONTINUED | OUTPATIENT
Start: 2023-04-18 | End: 2023-04-18 | Stop reason: HOSPADM

## 2023-04-18 RX ORDER — LIDOCAINE HYDROCHLORIDE 20 MG/ML
INJECTION, SOLUTION EPIDURAL; INFILTRATION; INTRACAUDAL; PERINEURAL PRN
Status: DISCONTINUED | OUTPATIENT
Start: 2023-04-18 | End: 2023-04-18 | Stop reason: SURG

## 2023-04-18 RX ORDER — HYDRALAZINE HYDROCHLORIDE 20 MG/ML
5 INJECTION INTRAMUSCULAR; INTRAVENOUS
Status: DISCONTINUED | OUTPATIENT
Start: 2023-04-18 | End: 2023-04-18 | Stop reason: HOSPADM

## 2023-04-18 RX ORDER — DIPHENHYDRAMINE HYDROCHLORIDE 50 MG/ML
12.5 INJECTION INTRAMUSCULAR; INTRAVENOUS
Status: DISCONTINUED | OUTPATIENT
Start: 2023-04-18 | End: 2023-04-18 | Stop reason: HOSPADM

## 2023-04-18 RX ORDER — ONDANSETRON 2 MG/ML
4 INJECTION INTRAMUSCULAR; INTRAVENOUS
Status: DISCONTINUED | OUTPATIENT
Start: 2023-04-18 | End: 2023-04-18 | Stop reason: HOSPADM

## 2023-04-18 RX ADMIN — MORPHINE SULFATE 2 MG: 4 INJECTION INTRAVENOUS at 05:45

## 2023-04-18 RX ADMIN — PROPOFOL 50 MG: 10 INJECTION, EMULSION INTRAVENOUS at 08:12

## 2023-04-18 RX ADMIN — PROPOFOL 50 MG: 10 INJECTION, EMULSION INTRAVENOUS at 08:18

## 2023-04-18 RX ADMIN — PROPOFOL 30 MG: 10 INJECTION, EMULSION INTRAVENOUS at 08:24

## 2023-04-18 RX ADMIN — PROPOFOL 50 MG: 10 INJECTION, EMULSION INTRAVENOUS at 08:22

## 2023-04-18 RX ADMIN — SODIUM PHOSPHATE 133 ML: 7; 19 ENEMA RECTAL at 07:38

## 2023-04-18 RX ADMIN — LIDOCAINE HYDROCHLORIDE 100 MG: 20 INJECTION, SOLUTION EPIDURAL; INFILTRATION; INTRACAUDAL at 08:10

## 2023-04-18 RX ADMIN — PROPOFOL 50 MG: 10 INJECTION, EMULSION INTRAVENOUS at 08:14

## 2023-04-18 RX ADMIN — PROPOFOL 50 MG: 10 INJECTION, EMULSION INTRAVENOUS at 08:16

## 2023-04-18 RX ADMIN — PROPOFOL 80 MG: 10 INJECTION, EMULSION INTRAVENOUS at 08:11

## 2023-04-18 RX ADMIN — PANTOPRAZOLE SODIUM 40 MG: 40 INJECTION, POWDER, FOR SOLUTION INTRAVENOUS at 05:39

## 2023-04-18 RX ADMIN — PROPOFOL 50 MG: 10 INJECTION, EMULSION INTRAVENOUS at 08:20

## 2023-04-18 ASSESSMENT — PAIN DESCRIPTION - PAIN TYPE
TYPE: ACUTE PAIN
TYPE: ACUTE PAIN;SURGICAL PAIN
TYPE: SURGICAL PAIN
TYPE: ACUTE PAIN;SURGICAL PAIN
TYPE: SURGICAL PAIN

## 2023-04-18 NOTE — ANESTHESIA POSTPROCEDURE EVALUATION
Patient: Eddie Daniel    Procedure Summary     Date: 04/18/23 Room / Location: [facility] ROOM 26 / SURGERY SAME DAY [facility]    Anesthesia Start: 0807 Anesthesia Stop: 0831    Procedures:       COLONOSCOPY (Anus)      COLONOSCOPY, WITH ARGON PLASMA COAGULATION (Anus) Diagnosis: (Bleeding from polypectomy site, residual colon polyps)    Surgeons: Bryce Molina M.D. Responsible Provider: Luis E Vela M.D.    Anesthesia Type: general ASA Status: 3 - Emergent          Final Anesthesia Type: general  Last vitals  BP   Blood Pressure: 122/59    Temp   36.7 °C (98 °F)    Pulse   65   Resp   14    SpO2   92 %      Anesthesia Post Evaluation    Patient location during evaluation: PACU  Patient participation: complete - patient participated  Level of consciousness: awake and alert    Airway patency: patent  Anesthetic complications: no  Cardiovascular status: hemodynamically stable  Respiratory status: acceptable  Hydration status: euvolemic    PONV: none          No notable events documented.     Nurse Pain Score: 0 (NPRS)

## 2023-04-18 NOTE — PROGRESS NOTES
Patient picked up by transport for discharge lissye. Patient dressed with all belongings including clothes, glasses, phone, and backpack.

## 2023-04-18 NOTE — ANESTHESIA PREPROCEDURE EVALUATION
Case: [id number] Date/Time: 04/18/23 0800    Procedure: COLONOSCOPY (Anus)    Anesthesia type: MAC    Pre-op diagnosis: Rectal bleeding.Right lower quadrant pain    Location: [facility] ROOM 26 / SURGERY SAME DAY [facility]    Surgeons: Bryce Molina M.D.          Relevant Problems   GI   (positive) GERD (gastroesophageal reflux disease)         (positive) Cirrhosis (HCC)      Other   (positive) Alcohol use   (positive) Familial polyposis of colon   (positive) History of DVT (deep vein thrombosis)   (positive) Lower GI bleeding   (positive) Rectal bleed       Physical Exam    Airway   Mallampati: III  TM distance: >3 FB  Neck ROM: full       Cardiovascular - normal exam  Rhythm: regular  Rate: normal  (-) murmur     Dental - normal exam           Pulmonary - normal exam  Breath sounds clear to auscultation     Abdominal    Neurological - normal exam                 Anesthesia Plan    ASA 3- EMERGENT (rectal bleeding)   ASA physical status 3 criteria: alcohol and/or substance dependence or abuse    Plan - general       Airway plan will be natural airway          Induction: intravenous    Postoperative Plan: Postoperative administration of opioids is intended.    Pertinent diagnostic labs and testing reviewed    Informed Consent:    Anesthetic plan and risks discussed with patient.    Use of blood products discussed with: patient whom consented to blood products.

## 2023-04-18 NOTE — DISCHARGE SUMMARY
Discharge Summary    CHIEF COMPLAINT ON ADMISSION  Chief Complaint   Patient presents with    Rectal Bleeding     Coloscopy done 2 weeks ago, has had rectal bleeding since then. Bright red blood noted with small clots. Hx of colon cancer.     Abdominal Pain     RLQ pain since Friday, 8/10 sharp burning, cramping pain. +nausea -appendix        Reason for Admission  rectal bleeding     Admission Date  4/16/2023    CODE STATUS  Full Code    HPI & HOSPITAL COURSE    Mr. Eddie Daniel is a 32 y.o. male who presented 4/16/2023 with past medical history of familial adenomatous polyposis status post 4 ileostomies (last last done 2012) comes to the ED on 4/17/2023 due to intermittent rectal bleed status post colonoscopy 2 weeks ago.      Patient denies contacting gastroenterologist status post procedure.  Patient also reports his head minimal bleeding per rectum since his colonoscopy was done approximately 2 weeks ago and has not slowed down.  Patient remarks he has had to change his underwear at least twice daily; remarks some right lower quadrant pain, 6/10, not alleviated by anything, not exacerbated by anything, no radiation.  Denies fever, chills, nausea, vomiting, chest pain, palpitations, shortness of breath, falls, trauma.     In the ED, patient AAOx4, no acute distress.  Vital signs stable.  BC unremarkable (hemoglobin 14.2; 14.5).  CHEM panel unremarkable.  UA unremarkable CT of the abdomen/pelvis demonstrated hepatomegaly and irregular hepatic contour fibrin changes of cirrhosis; fat-containing periumbilical hernia, stable linear hyperdensity tracking posterior to the right psoas muscle.  Patient will be admitted for lower GI bleed.    GI team, Dr. Torres has been consulted for management of lower GI bleed.  Records were requested from GI consultants from his last colonoscopy on 3/30/2023 with Dr. Blackman. Polyps were obtained with noted pathology results of NEG H. Pylori, NEG to atypia and malignancy, NEG  for high grade dysplasia.  Plan for flex sig today with GI 4/17/2023.      =======================================    Patient underwent flex sig on 4/18/2023 and it showed some bleeding at the prior polypectomy site.  Area was injected with good result.  Patient was able to tolerate a oral diet and was sent home to follow-up with his gastroenterologist in the outpatient setting    Therefore, he is discharged in good and stable condition to home with close outpatient follow-up.    The patient recovered much more quickly than anticipated on admission.    Discharge Date  4/18  FOLLOW UP ITEMS POST DISCHARGE    DISCHARGE DIAGNOSES  Principal Problem (Resolved):    Lower GI bleeding POA: Yes  Active Problems:    Familial polyposis of colon POA: Yes    History of colon cancer POA: Yes    Alcohol use POA: Yes    Cirrhosis (HCC) POA: Yes    Obesity (BMI 30-39.9) POA: Yes  Resolved Problems:    Intermittent right lower quadrant abdominal pain POA: Yes      FOLLOW UP  No future appointments.  [facility]  [id number]  [place] [address]  [phone]  Follow up        MEDICATIONS ON DISCHARGE     Medication List        CONTINUE taking these medications        Instructions   acetaminophen 500 MG Tabs  Commonly known as: TYLENOL   Take 1,000 mg by mouth every 6 hours as needed. Indications: Pain  Dose: 1,000 mg              Allergies  Allergies   Allergen Reactions    Hydrocodone-Acetaminophen Rash and Vomiting     Rash on neck and nausea/vomiting.    Tomato Hives     Fresh only       DIET  Orders Placed This Encounter   Procedures    Diet Order Diet: Low Fiber(GI Soft)     Standing Status:   Standing     Number of Occurrences:   1     Order Specific Question:   Diet:     Answer:   Low Fiber(GI Soft) [2]       ACTIVITY  As tolerated.  Weight bearing as tolerated    CONSULTATIONS    Dr contreras gi    PROCEDURES  PATIENT:   Eddie Zhu [facility]   1990         PREOPERATIVE DIAGNOSES/INDICATIONS:  History of familial adenomatous polyposis status post colectomy, restorative ileorectal anastomosis with recent lower endoscopy now with hematochezia.     PROCEDURE: Lower endoscopy with control of bleeding     PHYSICIAN:  Bryce Molina MD      ANESTHESIA:  Per anesthesiologist.  TIVA     LOCATION: [facility]     CONSENT: The risks, benefits and alternatives of the procedure were discussed in detail. The risks include and are not limited to bleeding, infection, perforation, missed lesions, and sedations risks (cardiopulmonary compromise and allergic reaction to medications).     DESCRIPTION:   The patient presented to the operating room.  A time out was performed prior to beginning the procedure.   The patient was placed in the left lateral recumbent position position. Patient was sedated by anesthesia: Propofol/TIVA.     OPERATIVE FINDINGS:     Digital rectal examination normal.  Endoscope advanced under direct visualization to 80 cm from the anal rectal verge.  Bowel preparation adequate.  Multiple small bleeding sites in the remnant rectum with minimal mucosal hemorrhage.  Treated with argon plasma coagulation.  2 residual polyps near the anal rectal verge.  Not removed.  Small bowel evaluated to 80 cm.  No blood present.  Retroflexion otherwise unremarkable.        Blood loss: Minimal     The patient tolerated the procedure well.       There were no immediate complications.     Pathology samples obtained: None     IMPRESSION:  #Minimal mucosal hemorrhage in the remnant rectum secondary to prior polypectomy site/biopsy sites.  Treated with APC with good effect.  #Small bowel normal.  No blood within the small bowel.  #Residual polyposis near the anorectal verge not removed.     RECOMMENDATIONS:  Return patient to hospital do for probable discharge today.  Resume regular diet.  Follow-up with primary gastroenterologist as an outpatient.  Expect some minor continued hematochezia.  Would not recommend relook  endoscopy acutely unless significant bleeding develops.             LABORATORY  Lab Results   Component Value Date    SODIUM 138 04/18/2023    POTASSIUM 3.8 04/18/2023    CHLORIDE 101 04/18/2023    CO2 27 04/18/2023    GLUCOSE 98 04/18/2023    BUN 13 04/18/2023    CREATININE 1.18 04/18/2023    CREATININE 1.0 01/11/2007        Lab Results   Component Value Date    WBC 6.1 04/18/2023    HEMOGLOBIN 13.3 (L) 04/18/2023    HEMATOCRIT 39.6 (L) 04/18/2023    PLATELETCT 215 04/18/2023        Total time of the discharge process exceeds 38 minutes.

## 2023-04-18 NOTE — OP REPORT
OPERATIVE REPORT    PATIENT:   Eddie Zhu [facility]   1990       PREOPERATIVE DIAGNOSES/INDICATIONS: History of familial adenomatous polyposis status post colectomy, restorative ileorectal anastomosis with recent lower endoscopy now with hematochezia.    PROCEDURE: Lower endoscopy with control of bleeding    PHYSICIAN:  Bryce Molina MD     ANESTHESIA:  Per anesthesiologist.  TIVA    LOCATION: [facility]    CONSENT: The risks, benefits and alternatives of the procedure were discussed in detail. The risks include and are not limited to bleeding, infection, perforation, missed lesions, and sedations risks (cardiopulmonary compromise and allergic reaction to medications).    DESCRIPTION:   The patient presented to the operating room.  A time out was performed prior to beginning the procedure.   The patient was placed in the left lateral recumbent position position. Patient was sedated by anesthesia: Propofol/TIVA.    OPERATIVE FINDINGS:    Digital rectal examination normal.  Endoscope advanced under direct visualization to 80 cm from the anal rectal verge.  Bowel preparation adequate.  Multiple small bleeding sites in the remnant rectum with minimal mucosal hemorrhage.  Treated with argon plasma coagulation.  2 residual polyps near the anal rectal verge.  Not removed.  Small bowel evaluated to 80 cm.  No blood present.  Retroflexion otherwise unremarkable.      Blood loss: Minimal    The patient tolerated the procedure well.      There were no immediate complications.    Pathology samples obtained: None    IMPRESSION:  #Minimal mucosal hemorrhage in the remnant rectum secondary to prior polypectomy site/biopsy sites.  Treated with APC with good effect.  #Small bowel normal.  No blood within the small bowel.  #Residual polyposis near the anorectal verge not removed.    RECOMMENDATIONS:  Return patient to hospital do for probable discharge today.  Resume regular diet.  Follow-up with primary gastroenterologist as an  outpatient.  Expect some minor continued hematochezia.  Would not recommend relook endoscopy acutely unless significant bleeding develops.

## 2023-04-18 NOTE — OR NURSING
0829 - Pt to PACU 2 from OR.  Bedside report from anesthesiologist and RN.  Attached to monitoring, VSS, breathing is calm and unlabored.  Oral airway in place, 6L mask.  No signs pain or nausea.     0849 - Oral airway removed. Now on room air, had some sips of water.     0852 - Report to jennifer Duarn RN on CDU    0904 - Dr. Molina bedside to update patient, he awakens, but falls back to sleep quickly.  VSS, on room air.     0928 - Pt taken off floor by transport.  Has all belongings on back of the gurney in a bag. Notified patient and transporter of this.  VSS, on room air.
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
## 2023-04-18 NOTE — CARE PLAN
The patient is Stable - Low risk of patient condition declining or worsening    Shift Goals  Clinical Goals: Sigmoidoscopy today  Patient Goals: Procedure today  Family Goals: No family present    Progress made toward(s) clinical / shift goals:    Problem: Pain - Standard  Goal: Alleviation of pain or a reduction in pain to the patient’s comfort goal  Outcome: Met     Problem: Knowledge Deficit - Standard  Goal: Patient and family/care givers will demonstrate understanding of plan of care, disease process/condition, diagnostic tests and medications  Outcome: Met       Patient declines pain at this time and understands the procedure that occurred.
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
## 2023-04-18 NOTE — CARE PLAN
The patient is Stable - Low risk of patient condition declining or worsening    Shift Goals  Clinical Goals: Flex sigmoidoscopy  Patient Goals: rest, comfort  Family Goals: not at bedside    Progress made toward(s) clinical / shift goals:    Problem: Pain - Standard  Goal: Alleviation of pain or a reduction in pain to the patient’s comfort goal  Outcome: Progressing  Expected end date: 4/18/2023     Problem: Knowledge Deficit - Standard  Goal: Patient and family/care givers will demonstrate understanding of plan of care, disease process/condition, diagnostic tests and medications  Outcome: Progressing  Expected end date: 4/18/2023

## 2023-04-18 NOTE — ANESTHESIA TIME REPORT
Anesthesia Start and Stop Event Times     Date Time Event    4/18/2023 0751 Ready for Procedure     0807 Anesthesia Start     0831 Anesthesia Stop        Responsible Staff  04/18/23    Name Role Begin End    Luis E Vela M.D. Anesth 0807 0831        Overtime Reason:  no overtime (within assigned shift)    Comments:

## 2023-04-18 NOTE — INTERVAL H&P NOTE
Patient seen and evaluated this AM.  Agree with consultative note.  For lower endoscopy.  Risk-benefit reviewed.  Patient consents.

## 2023-04-18 NOTE — PROGRESS NOTES
Patient returned from PACU on [facility]. Patient ambulated from [facility] to hospital bed with steady gate. Patient in bed resting with call light within reach. Patient has no needs at this time.

## 2023-08-10 ENCOUNTER — APPOINTMENT (OUTPATIENT)
Dept: RADIOLOGY | Facility: MEDICAL CENTER | Age: 33
DRG: 390 | End: 2023-08-10
Payer: MEDICAID

## 2023-08-10 ENCOUNTER — HOSPITAL ENCOUNTER (INPATIENT)
Facility: MEDICAL CENTER | Age: 33
LOS: 2 days | DRG: 390 | End: 2023-08-12
Attending: STUDENT IN AN ORGANIZED HEALTH CARE EDUCATION/TRAINING PROGRAM | Admitting: STUDENT IN AN ORGANIZED HEALTH CARE EDUCATION/TRAINING PROGRAM
Payer: MEDICAID

## 2023-08-10 ENCOUNTER — HOSPITAL ENCOUNTER (OUTPATIENT)
Dept: RADIOLOGY | Facility: MEDICAL CENTER | Age: 33
End: 2023-08-10

## 2023-08-10 DIAGNOSIS — K56.609 SBO (SMALL BOWEL OBSTRUCTION) (HCC): ICD-10-CM

## 2023-08-10 LAB
ALBUMIN SERPL BCP-MCNC: 4 G/DL (ref 3.2–4.9)
ALBUMIN/GLOB SERPL: 1.5 G/DL
ALP SERPL-CCNC: 78 U/L (ref 30–99)
ALT SERPL-CCNC: 24 U/L (ref 2–50)
ANION GAP SERPL CALC-SCNC: 10 MMOL/L (ref 7–16)
AST SERPL-CCNC: 21 U/L (ref 12–45)
BASOPHILS # BLD AUTO: 0.8 % (ref 0–1.8)
BASOPHILS # BLD: 0.04 K/UL (ref 0–0.12)
BILIRUB SERPL-MCNC: 0.3 MG/DL (ref 0.1–1.5)
BUN SERPL-MCNC: 13 MG/DL (ref 8–22)
CALCIUM ALBUM COR SERPL-MCNC: 8.9 MG/DL (ref 8.5–10.5)
CALCIUM SERPL-MCNC: 8.9 MG/DL (ref 8.5–10.5)
CHLORIDE SERPL-SCNC: 105 MMOL/L (ref 96–112)
CO2 SERPL-SCNC: 24 MMOL/L (ref 20–33)
CREAT SERPL-MCNC: 0.85 MG/DL (ref 0.5–1.4)
EOSINOPHIL # BLD AUTO: 0.12 K/UL (ref 0–0.51)
EOSINOPHIL NFR BLD: 2.5 % (ref 0–6.9)
ERYTHROCYTE [DISTWIDTH] IN BLOOD BY AUTOMATED COUNT: 42.1 FL (ref 35.9–50)
GFR SERPLBLD CREATININE-BSD FMLA CKD-EPI: 116 ML/MIN/1.73 M 2
GLOBULIN SER CALC-MCNC: 2.7 G/DL (ref 1.9–3.5)
GLUCOSE SERPL-MCNC: 105 MG/DL (ref 65–99)
HCT VFR BLD AUTO: 43.5 % (ref 42–52)
HGB BLD-MCNC: 13.9 G/DL (ref 14–18)
IMM GRANULOCYTES # BLD AUTO: 0.03 K/UL (ref 0–0.11)
IMM GRANULOCYTES NFR BLD AUTO: 0.6 % (ref 0–0.9)
LACTATE SERPL-SCNC: 1.4 MMOL/L (ref 0.5–2)
LYMPHOCYTES # BLD AUTO: 1.69 K/UL (ref 1–4.8)
LYMPHOCYTES NFR BLD: 35.1 % (ref 22–41)
MCH RBC QN AUTO: 26.7 PG (ref 27–33)
MCHC RBC AUTO-ENTMCNC: 32 G/DL (ref 32.3–36.5)
MCV RBC AUTO: 83.5 FL (ref 81.4–97.8)
MONOCYTES # BLD AUTO: 0.45 K/UL (ref 0–0.85)
MONOCYTES NFR BLD AUTO: 9.3 % (ref 0–13.4)
NEUTROPHILS # BLD AUTO: 2.49 K/UL (ref 1.82–7.42)
NEUTROPHILS NFR BLD: 51.7 % (ref 44–72)
NRBC # BLD AUTO: 0 K/UL
NRBC BLD-RTO: 0 /100 WBC (ref 0–0.2)
PLATELET # BLD AUTO: 267 K/UL (ref 164–446)
PMV BLD AUTO: 9.6 FL (ref 9–12.9)
POTASSIUM SERPL-SCNC: 4.1 MMOL/L (ref 3.6–5.5)
PROT SERPL-MCNC: 6.7 G/DL (ref 6–8.2)
RBC # BLD AUTO: 5.21 M/UL (ref 4.7–6.1)
SODIUM SERPL-SCNC: 139 MMOL/L (ref 135–145)
WBC # BLD AUTO: 4.8 K/UL (ref 4.8–10.8)

## 2023-08-10 PROCEDURE — 99254 IP/OBS CNSLTJ NEW/EST MOD 60: CPT | Performed by: SURGERY

## 2023-08-10 PROCEDURE — 700105 HCHG RX REV CODE 258: Performed by: STUDENT IN AN ORGANIZED HEALTH CARE EDUCATION/TRAINING PROGRAM

## 2023-08-10 PROCEDURE — 96376 TX/PRO/DX INJ SAME DRUG ADON: CPT

## 2023-08-10 PROCEDURE — 304538 HCHG NG TUBE

## 2023-08-10 PROCEDURE — 80053 COMPREHEN METABOLIC PANEL: CPT

## 2023-08-10 PROCEDURE — 99285 EMERGENCY DEPT VISIT HI MDM: CPT

## 2023-08-10 PROCEDURE — 700111 HCHG RX REV CODE 636 W/ 250 OVERRIDE (IP): Mod: JZ | Performed by: STUDENT IN AN ORGANIZED HEALTH CARE EDUCATION/TRAINING PROGRAM

## 2023-08-10 PROCEDURE — 770006 HCHG ROOM/CARE - MED/SURG/GYN SEMI*

## 2023-08-10 PROCEDURE — 96374 THER/PROPH/DIAG INJ IV PUSH: CPT

## 2023-08-10 PROCEDURE — 700102 HCHG RX REV CODE 250 W/ 637 OVERRIDE(OP)

## 2023-08-10 PROCEDURE — 85025 COMPLETE CBC W/AUTO DIFF WBC: CPT

## 2023-08-10 PROCEDURE — A9270 NON-COVERED ITEM OR SERVICE: HCPCS

## 2023-08-10 PROCEDURE — 83605 ASSAY OF LACTIC ACID: CPT

## 2023-08-10 PROCEDURE — 96375 TX/PRO/DX INJ NEW DRUG ADDON: CPT

## 2023-08-10 PROCEDURE — 36415 COLL VENOUS BLD VENIPUNCTURE: CPT

## 2023-08-10 PROCEDURE — 99223 1ST HOSP IP/OBS HIGH 75: CPT | Mod: AI,25 | Performed by: STUDENT IN AN ORGANIZED HEALTH CARE EDUCATION/TRAINING PROGRAM

## 2023-08-10 PROCEDURE — 99418 PROLNG IP/OBS E/M EA 15 MIN: CPT | Performed by: STUDENT IN AN ORGANIZED HEALTH CARE EDUCATION/TRAINING PROGRAM

## 2023-08-10 RX ORDER — MORPHINE SULFATE 4 MG/ML
4 INJECTION INTRAVENOUS EVERY 4 HOURS PRN
Status: DISCONTINUED | OUTPATIENT
Start: 2023-08-10 | End: 2023-08-11

## 2023-08-10 RX ORDER — PROCHLORPERAZINE EDISYLATE 5 MG/ML
5-10 INJECTION INTRAMUSCULAR; INTRAVENOUS EVERY 4 HOURS PRN
Status: DISCONTINUED | OUTPATIENT
Start: 2023-08-10 | End: 2023-08-12 | Stop reason: HOSPADM

## 2023-08-10 RX ORDER — PROMETHAZINE HYDROCHLORIDE 25 MG/1
12.5-25 SUPPOSITORY RECTAL EVERY 4 HOURS PRN
Status: DISCONTINUED | OUTPATIENT
Start: 2023-08-10 | End: 2023-08-12 | Stop reason: HOSPADM

## 2023-08-10 RX ORDER — ONDANSETRON 4 MG/1
4 TABLET, ORALLY DISINTEGRATING ORAL EVERY 4 HOURS PRN
Status: DISCONTINUED | OUTPATIENT
Start: 2023-08-10 | End: 2023-08-12 | Stop reason: HOSPADM

## 2023-08-10 RX ORDER — ONDANSETRON 2 MG/ML
4 INJECTION INTRAMUSCULAR; INTRAVENOUS EVERY 4 HOURS PRN
Status: DISCONTINUED | OUTPATIENT
Start: 2023-08-10 | End: 2023-08-12 | Stop reason: HOSPADM

## 2023-08-10 RX ORDER — ACETAMINOPHEN 325 MG/1
650 TABLET ORAL EVERY 6 HOURS PRN
Status: DISCONTINUED | OUTPATIENT
Start: 2023-08-10 | End: 2023-08-12 | Stop reason: HOSPADM

## 2023-08-10 RX ORDER — SODIUM CHLORIDE 9 MG/ML
INJECTION, SOLUTION INTRAVENOUS CONTINUOUS
Status: DISCONTINUED | OUTPATIENT
Start: 2023-08-10 | End: 2023-08-11

## 2023-08-10 RX ORDER — PROMETHAZINE HYDROCHLORIDE 25 MG/1
12.5-25 TABLET ORAL EVERY 4 HOURS PRN
Status: DISCONTINUED | OUTPATIENT
Start: 2023-08-10 | End: 2023-08-12 | Stop reason: HOSPADM

## 2023-08-10 RX ORDER — PANTOPRAZOLE SODIUM 40 MG/10ML
40 INJECTION, POWDER, LYOPHILIZED, FOR SOLUTION INTRAVENOUS DAILY
Status: DISCONTINUED | OUTPATIENT
Start: 2023-08-11 | End: 2023-08-12 | Stop reason: HOSPADM

## 2023-08-10 RX ADMIN — MORPHINE SULFATE 4 MG: 4 INJECTION, SOLUTION INTRAMUSCULAR; INTRAVENOUS at 14:59

## 2023-08-10 RX ADMIN — MORPHINE SULFATE 4 MG: 4 INJECTION, SOLUTION INTRAMUSCULAR; INTRAVENOUS at 04:18

## 2023-08-10 RX ADMIN — ONDANSETRON 4 MG: 2 INJECTION INTRAMUSCULAR; INTRAVENOUS at 04:50

## 2023-08-10 RX ADMIN — MORPHINE SULFATE 4 MG: 4 INJECTION, SOLUTION INTRAMUSCULAR; INTRAVENOUS at 20:44

## 2023-08-10 RX ADMIN — PHENOL 1 SPRAY: 1.5 LIQUID ORAL at 14:59

## 2023-08-10 RX ADMIN — MORPHINE SULFATE 4 MG: 4 INJECTION, SOLUTION INTRAMUSCULAR; INTRAVENOUS at 10:14

## 2023-08-10 RX ADMIN — ONDANSETRON 4 MG: 2 INJECTION INTRAMUSCULAR; INTRAVENOUS at 10:13

## 2023-08-10 RX ADMIN — ONDANSETRON 4 MG: 2 INJECTION INTRAMUSCULAR; INTRAVENOUS at 20:42

## 2023-08-10 RX ADMIN — SODIUM CHLORIDE: 9 INJECTION, SOLUTION INTRAVENOUS at 04:54

## 2023-08-10 RX ADMIN — SODIUM CHLORIDE: 9 INJECTION, SOLUTION INTRAVENOUS at 19:09

## 2023-08-10 RX ADMIN — ONDANSETRON 4 MG: 2 INJECTION INTRAMUSCULAR; INTRAVENOUS at 14:58

## 2023-08-10 ASSESSMENT — COGNITIVE AND FUNCTIONAL STATUS - GENERAL
SUGGESTED CMS G CODE MODIFIER DAILY ACTIVITY: CH
DAILY ACTIVITIY SCORE: 24
MOBILITY SCORE: 24
SUGGESTED CMS G CODE MODIFIER MOBILITY: CH

## 2023-08-10 ASSESSMENT — PATIENT HEALTH QUESTIONNAIRE - PHQ9
2. FEELING DOWN, DEPRESSED, IRRITABLE, OR HOPELESS: NOT AT ALL
SUM OF ALL RESPONSES TO PHQ9 QUESTIONS 1 AND 2: 0
1. LITTLE INTEREST OR PLEASURE IN DOING THINGS: NOT AT ALL

## 2023-08-10 ASSESSMENT — ENCOUNTER SYMPTOMS
MUSCULOSKELETAL NEGATIVE: 1
FALLS: 0
NAUSEA: 1
BLOOD IN STOOL: 0
VOMITING: 1
FLANK PAIN: 0
SPEECH CHANGE: 0
SHORTNESS OF BREATH: 0
NEUROLOGICAL NEGATIVE: 1
RESPIRATORY NEGATIVE: 1
PSYCHIATRIC NEGATIVE: 1
SENSORY CHANGE: 0
ABDOMINAL PAIN: 1
CARDIOVASCULAR NEGATIVE: 1
SORE THROAT: 1
COUGH: 0
EYES NEGATIVE: 1
MYALGIAS: 0

## 2023-08-10 ASSESSMENT — PAIN DESCRIPTION - PAIN TYPE
TYPE: ACUTE PAIN

## 2023-08-10 ASSESSMENT — FIBROSIS 4 INDEX: FIB4 SCORE: 0.69

## 2023-08-10 NOTE — HOSPITAL COURSE
This is a 33-year-old male with past medical history of colon cancer s/p total colectomy with ileocolic anastamosis who presented to the ED on 8/10/2023 with abdominal pain, nausea and vomiting x 4 days.    CTAP noted mildly distended bowel loop with extensive in upper abdomen down to the rectum, twisting the bowel loop in the mid abdomen with swirling of the mesenteric vascular chair, and mild mesenteric edema suspicious for internal hernia or possible volvulus.  General surgery was consulted, recommended NGT placement.  Patient is s/p SBFT - negative for obstruction.  Patient's diet advanced to clear liquid, full liquid with no evidence of nausea, vomiting or abdominal pain.  Patient to continue advancing his diet as tolerated.

## 2023-08-10 NOTE — ED NOTES
Pt still resting in bed with even and unlabored breaths. No distress noted and pt denies any needs at this time.

## 2023-08-10 NOTE — ED NOTES
Pt resting in bed with eyes closed in no apparent distress. Pt has even and unlabored breaths. Will continue to monitor pt.

## 2023-08-10 NOTE — PROGRESS NOTES
Follow-up exam  Full consult done this morning by Dr. Del Cid  NG tube is now in place  Patient states he feels much better  States that he had multiple episodes of flatus  Abdomen is benign  Plan:  Continue nonoperative management with NG suction  Continues to do well clinically will be a candidate for NG clamping and clears tomorrow.

## 2023-08-10 NOTE — PROGRESS NOTES
Hospital Medicine Daily Progress Note    Date of Service  8/10/2023    Chief Complaint  Eddie Daniel is a 33 y.o. male admitted 8/10/2023 with abdominal pain    Hospital Course  Eddie Daniel is a 33 y.o. male who presented 8/10/2023 with abdominal pain.  Patient presents with abdominal pain for the last 4 days.  Describes it as dull and intermittent.  Associated with nausea and multiple episodes of nonbloody nonbilious vomiting.     Patient has a history of of colon cancer status post ileorectal anastomosis and previous SBO's.  He presented to outside facility ER.     CAT scan abdomen pelvis with contrast showing mildly distended bowel loop which extends from the upper abdomen down to the rectum.  Twist in the bowel loop in the mid abdomen with swirling of the mesenteric vascular chair and mild mesenteric edema suspicious for internal hernia or possibly volvulus of the bowel loop.     Labs at outside facility:  White blood cell count 5.4 hemoglobin 13.7 platelet 257  Glucose 132 BUN 12 creatinine 0.9  sodium 136 potassium 4 chloride 102 bicarb 23 magnesium 1.8 liver function test within normal limits  UA negative for infection.     In ER, patient found to have normal vital signs.  General surgery was consulted, recommends NG tube and medical management for now.    Interval Problem Update  8/10/2023: SBO, general surgery following, medical management. Feeling some improvement in N/V this afternoon. Passing gas. Primary complaint is discomfort with NG tube. Per surgery hope is to possibly advance to clamp trial tomorrow if he does well overnight. Continue IVF while NPO.     I have discussed this patient's plan of care and discharge plan at IDT rounds today with Case Management, Nursing, Nursing leadership, and other members of the IDT team.    Consultants/Specialty  general surgery    Code Status  Full Code    Disposition  The patient is not medically cleared for discharge to home or a  post-acute facility.  Anticipate discharge to: home with close outpatient follow-up    I have placed the appropriate orders for post-discharge needs.    Review of Systems  Review of Systems   Constitutional:  Positive for malaise/fatigue.   HENT:  Positive for sore throat.    Respiratory:  Negative for cough and shortness of breath.    Cardiovascular:  Negative for chest pain and leg swelling.   Gastrointestinal:  Positive for abdominal pain, nausea and vomiting. Negative for blood in stool.   Genitourinary:  Negative for dysuria and flank pain.   Musculoskeletal:  Negative for falls and myalgias.   Neurological:  Negative for sensory change and speech change.        Physical Exam  Temp:  [36.3 °C (97.3 °F)-36.6 °C (97.9 °F)] 36.6 °C (97.9 °F)  Pulse:  [57-91] 91  Resp:  [12-18] 18  BP: (110-152)/(66-90) 152/86  SpO2:  [89 %-96 %] 96 %    Physical Exam  Vitals and nursing note reviewed.   Constitutional:       Appearance: Normal appearance. He is obese.   HENT:      Head: Normocephalic.      Nose:      Comments: NG tube     Mouth/Throat:      Mouth: Mucous membranes are moist.   Eyes:      Pupils: Pupils are equal, round, and reactive to light.   Cardiovascular:      Rate and Rhythm: Normal rate and regular rhythm.      Pulses: Normal pulses.   Pulmonary:      Effort: Pulmonary effort is normal.      Breath sounds: Normal breath sounds.   Abdominal:      General: Abdomen is flat.      Tenderness: There is abdominal tenderness.      Comments: Midline scar   Musculoskeletal:         General: Normal range of motion.      Cervical back: Normal range of motion and neck supple.   Skin:     General: Skin is warm.      Capillary Refill: Capillary refill takes less than 2 seconds.   Neurological:      General: No focal deficit present.      Mental Status: He is alert and oriented to person, place, and time. Mental status is at baseline.   Psychiatric:         Mood and Affect: Mood normal.         Behavior: Behavior normal.          Fluids    Intake/Output Summary (Last 24 hours) at 8/10/2023 1612  Last data filed at 8/10/2023 1139  Gross per 24 hour   Intake --   Output 350 ml   Net -350 ml       Laboratory  Recent Labs     08/10/23  0235   WBC 4.8   RBC 5.21   HEMOGLOBIN 13.9*   HEMATOCRIT 43.5   MCV 83.5   MCH 26.7*   MCHC 32.0*   RDW 42.1   PLATELETCT 267   MPV 9.6     Recent Labs     08/10/23  0235   SODIUM 139   POTASSIUM 4.1   CHLORIDE 105   CO2 24   GLUCOSE 105*   BUN 13   CREATININE 0.85   CALCIUM 8.9     Imaging  OUTSIDE IMAGES-CT ABDOMEN /PELVIS   Final Result           Assessment/Plan  * SBO (small bowel obstruction) (HCC)- (present on admission)  Assessment & Plan  General surgery following    Hx colectomy with ileocolic anastamosis 2/2 cancer  Patient found to have possible volvulus or small bowel obstruction as per CT abdomen.    General surgery recommended NG tube placement and symptomatic management for now.    IV fluids, Zofran, morphine as needed.    Monitor for respiratory depression from morphine administration.    This afternoon passing gas, feeling better   Per surgery possible clamp & clears in AM     History of colon cancer- (present on admission)  Assessment & Plan  Currently in remission         VTE prophylaxis:   SCDs/TEDs      I have performed a physical exam and reviewed and updated ROS and Plan today (8/10/2023). In review of yesterday's note (8/9/2023), there are no changes except as documented above.

## 2023-08-10 NOTE — ED NOTES
Bedside report received from Ethel CORBETT RN. Upon shift change pt is sleeping in bed with even and unlabored breaths, in no apparent distress. Pt is on all monitoring devices and NG tube is in place, connected to low wall suction. Will continue to monitor pt while awaiting bed assignment upstairs and further orders.

## 2023-08-10 NOTE — H&P
Hospital Medicine History & Physical Note    Date of Service  8/10/2023    Primary Care Physician  Pcp Pt States None    Consultants  General Surgery    Code Status  Full Code    Chief Complaint  Chief Complaint   Patient presents with    Bowel Obstruction     Transfer from [facility] for SBO with volvulus. Pt c/o abdominal pain for 4 days with N/V. Pt c/o 7/10 abdominal pain RLQ.       History of Presenting Illness  Eddie Daniel is a 33 y.o. male who presented 8/10/2023 with abdominal pain.  Patient presents with abdominal pain for the last 4 days.  Describes it as dull and intermittent.  Associated with nausea and multiple episodes of nonbloody nonbilious vomiting.    Patient has a history of of colon cancer status post ileorectal anastomosis and previous SBO's.  He presented to outside facility ER.    CAT scan abdomen pelvis with contrast showing mildly distended bowel loop which extends from the upper abdomen down to the rectum.  Twist in the bowel loop in the mid abdomen with swirling of the mesenteric vascular chair and mild mesenteric edema suspicious for internal hernia or possibly volvulus of the bowel loop.    Labs at outside facility:  White blood cell count 5.4 hemoglobin 13.7 platelet 257  Glucose 132 BUN 12 creatinine 0.9  sodium 136 potassium 4 chloride 102 bicarb 23 magnesium 1.8 liver function test within normal limits  UA negative for infection.    In ER, patient found to have normal vital signs.  General surgery was consulted, recommends NG tube and medical management for now.    I discussed the plan of care with patient.    Review of Systems  Review of Systems   Constitutional:  Positive for malaise/fatigue.   HENT: Negative.     Eyes: Negative.    Respiratory: Negative.     Cardiovascular: Negative.    Gastrointestinal:  Positive for abdominal pain, nausea and vomiting.   Genitourinary: Negative.    Musculoskeletal: Negative.    Skin: Negative.    Neurological: Negative.     Endo/Heme/Allergies: Negative.    Psychiatric/Behavioral: Negative.         Past Medical History   has a past medical history of Blood clotting disorder ([facility]) (2014), Cancer ([facility]) (dx 2010), Colon cancer ([facility]) (2011), Colon polyps, Dental disorder, Heart burn, Indigestion, Migraine, MRSA (methicillin resistant Staphylococcus aureus) (05/2011), Other specified disorder of intestines, Pain, Right ankle pain (08/03/2020), Seasonal allergies, and Snoring.    Surgical History   has a past surgical history that includes pr repair bladder wound/inj,complic (2007); arthroscopy, knee (Right, 02/28/2014); ileostomy (3/8/2011); colectomy (3/8/2011); ileostomy (5/3/2011); exploratory laparotomy (5/23/2011); ileostomy (5/23/2011); ileostomy (4/3/2012); knee arthrotomy (2007); knee arthroscopy (11/6/2014); open osteochondral allograft (11/6/2014); knee arthroscopy (Right, 2014); shoulder arthroscopy w/ rotator cuff repair (Right, 2018); tonsillectomy (06/2020); achilles tendon repair (Right, 8/6/2020); exostosis excision (Right, 8/6/2020); tendon transfer (Right, 8/6/2020); pr colonoscopy,diagnostic (N/A, 4/18/2023); and colonoscopy with apc (N/A, 4/18/2023).     Family History  family history includes Cancer in his father, maternal grandmother, paternal grandmother, and another family member; Diabetes in his paternal grandmother.   Family history reviewed with patient. There is no family history that is pertinent to the chief complaint.     Social History   reports that he has never smoked. His smokeless tobacco use includes chew. He reports current alcohol use of about 4.2 - 8.4 oz of alcohol per week. He reports that he does not use drugs.    Allergies  Allergies   Allergen Reactions    Hydrocodone-Acetaminophen Rash and Vomiting     Rash on neck and nausea/vomiting.    Tomato Hives     Fresh only       Medications  Prior to Admission Medications   Prescriptions Last Dose Informant Patient Reported? Taking?   acetaminophen  (TYLENOL) 500 MG Tab  Patient Yes No   Sig: Take 1,000 mg by mouth every 6 hours as needed. Indications: Pain      Facility-Administered Medications: None       Physical Exam  Temp:  [36.3 °C (97.3 °F)] 36.3 °C (97.3 °F)  Pulse:  [66] 66  Resp:  [17] 17  BP: (145)/(84) 145/84  SpO2:  [92 %] 92 %  Blood Pressure: (!) 145/84   Temperature: 36.3 °C (97.3 °F)   Pulse: 66   Respiration: 17   Pulse Oximetry: 92 %       Physical Exam  Constitutional:       Appearance: Normal appearance. He is normal weight.   HENT:      Head: Normocephalic.      Nose: Nose normal.      Mouth/Throat:      Mouth: Mucous membranes are moist.   Eyes:      Pupils: Pupils are equal, round, and reactive to light.   Cardiovascular:      Rate and Rhythm: Normal rate and regular rhythm.      Pulses: Normal pulses.   Pulmonary:      Effort: Pulmonary effort is normal.      Breath sounds: Normal breath sounds.   Abdominal:      General: Abdomen is flat.      Comments: Several abdominal scars are noted.  Healed well.  Abdomen not fully soft, but not rigid/peritoneal either.  Tenderness upon light palpation.     Musculoskeletal:         General: Normal range of motion.      Cervical back: Neck supple.   Skin:     General: Skin is warm.   Neurological:      General: No focal deficit present.      Mental Status: He is alert and oriented to person, place, and time. Mental status is at baseline.   Psychiatric:         Mood and Affect: Mood normal.         Behavior: Behavior normal.         Thought Content: Thought content normal.         Judgment: Judgment normal.         Laboratory:          No results for input(s): ALTSGPT, ASTSGOT, ALKPHOSPHAT, TBILIRUBIN, DBILIRUBIN, GAMMAGT, AMYLASE, LIPASE, ALB, PREALBUMIN, GLUCOSE in the last 72 hours.      No results for input(s): NTPROBNP in the last 72 hours.      No results for input(s): TROPONINT in the last 72 hours.    Imaging:  OUTSIDE IMAGES-CT ABDOMEN /PELVIS   Final Result          no X-Ray or EKG  requiring interpretation    Assessment/Plan:  Justification for Admission Status  I anticipate this patient will require at least two midnights for appropriate medical management, necessitating inpatient admission because patient has small bowel obstruction and may need surgical intervention    Patient will need a Med/Surg bed on MEDICAL service .  The need is secondary to small bowel obstruction.    * SBO (small bowel obstruction) (HCC)- (present on admission)  Assessment & Plan  Spoke with ERP, who consulted general surgery.  Patient found to have possible volvulus or small bowel obstruction as per CT abdomen.  General surgery recommended NG tube placement and symptomatic management for now.  Patient will be treated with fluids, Zofran, morphine as needed.  Monitor for respiratory depression from morphine ministration.    History of colon cancer- (present on admission)  Assessment & Plan  Currently in remission        VTE prophylaxis: pharmacologic prophylaxis contraindicated due to possible intervention by surgery    Total time spent on visit 90 minutes reviewing records from outside facility, discussing plan, diagnosis, treatment, plan of care.

## 2023-08-10 NOTE — ED PROVIDER NOTES
ED Provider Note    CHIEF COMPLAINT  Chief Complaint   Patient presents with    Bowel Obstruction     Transfer from [facility] for SBO with volvulus. Pt c/o abdominal pain for 4 days with N/V. Pt c/o 7/10 abdominal pain RLQ.       EXTERNAL RECORDS REVIEWED  Outpatient Notes office visit on 8/21/2022 for neck pain    HPI/ROS  LIMITATION TO HISTORY   Select: : None  OUTSIDE HISTORIAN(S):  EMS orts the patient is being transferred for possible small bowel obstruction or volvulus    Eddie Daniel is a 33 y.o. male who presents with reported small bowel obstruction for volvulus.  Patient has had abdominal pain nausea vomiting for 4 days.  Patient reports the pain is moderate to severe in the right upper right lower quadrant.  Patient has had similar findings in the past and was observed for partial small bowel obstruction.  Patient reports history of colectomy.  Patient denies fevers, chills, bodies, sweats.    PAST MEDICAL HISTORY   has a past medical history of Blood clotting disorder ([facility]) (2014), Cancer ([facility]) (dx 2010), Colon cancer ([facility]) (2011), Colon polyps, Dental disorder, Heart burn, Indigestion, Migraine, MRSA (methicillin resistant Staphylococcus aureus) (05/2011), Other specified disorder of intestines, Pain, Right ankle pain (08/03/2020), Seasonal allergies, and Snoring.    SURGICAL HISTORY   has a past surgical history that includes repair bladder wound/inj,complic (2007); arthroscopy, knee (Right, 02/28/2014); ileostomy (3/8/2011); colectomy (3/8/2011); ileostomy (5/3/2011); exploratory laparotomy (5/23/2011); ileostomy (5/23/2011); ileostomy (4/3/2012); knee arthrotomy (2007); knee arthroscopy (11/6/2014); open osteochondral allograft (11/6/2014); knee arthroscopy (Right, 2014); shoulder arthroscopy w/ rotator cuff repair (Right, 2018); tonsillectomy (06/2020); achilles tendon repair (Right, 8/6/2020); exostosis excision (Right, 8/6/2020); tendon transfer (Right, 8/6/2020);  "colonoscopy,diagnostic (N/A, 4/18/2023); and colonoscopy with apc (N/A, 4/18/2023).    FAMILY HISTORY  Family History   Problem Relation Age of Onset    Cancer Other     Cancer Father     Cancer Maternal Grandmother     Cancer Paternal Grandmother     Diabetes Paternal Grandmother        SOCIAL HISTORY  Social History     Tobacco Use    Smoking status: Never    Smokeless tobacco: Current     Types: Chew   Vaping Use    Vaping Use: Never used   Substance and Sexual Activity    Alcohol use: Yes     Alcohol/week: 4.2 - 8.4 oz     Types: 7 - 14 Cans of beer per week     Comment: Occ    Drug use: Never    Sexual activity: Yes     Partners: Female     Comment: , works for [employer] as a        CURRENT MEDICATIONS  Home Medications       Reviewed by Ethel Barragan R.N. (Registered Nurse) on 08/10/23 at 0233  Med List Status: Not Addressed     Medication Last Dose Status   acetaminophen (TYLENOL) 500 MG Tab  Active                    ALLERGIES  Allergies   Allergen Reactions    Hydrocodone-Acetaminophen Rash and Vomiting     Rash on neck and nausea/vomiting.    Tomato Hives     Fresh only       PHYSICAL EXAM  VITAL SIGNS: BP (!) 145/84   Pulse 66   Temp 36.3 °C (97.3 °F)   Resp 17   Ht 1.753 m (5' 9\")   Wt 88.9 kg (196 lb)   SpO2 92%   BMI 28.94 kg/m²    Vitals and nursing note reviewed.   Constitutional:       Comments: Patient is lying in bed supine, pleasant, conversant, speaking in complete sentences, uncomfortable appearing  HENT:      Head: Normocephalic and atraumatic.   Eyes:      Extraocular Movements: Extraocular movements intact.      Conjunctiva/sclera: Conjunctivae normal.      Pupils: Pupils are equal, round, and reactive to light.   Cardiovascular:      Pulses: Normal pulses.      Comments: HR 66  Pulmonary:      Effort: Pulmonary effort is normal. No respiratory distress.   Abdominal:      Comments: Abdomen is soft, right upper quadrant and right lower quadrant tenderness to " palpation with voluntary guarding  Musculoskeletal:         General: No swelling. Normal range of motion.      Cervical back: Normal range of motion. No rigidity.   Skin:     General: Skin is warm and dry.      Capillary Refill: Capillary refill takes less than 2 seconds.   Neurological:      Mental Status: Alert.         DIAGNOSTIC STUDIES / PROCEDURES      RADIOLOGY  I have independently interpreted the diagnostic imaging associated with this visit and am waiting the final reading from the radiologist.   My preliminary interpretation is as follows: No perforation  Radiologist interpretation: Partial small bowel obstruction    COURSE & MEDICAL DECISION MAKING      INITIAL ASSESSMENT, COURSE AND PLAN  Care Narrative: CBC to evaluate for acute anemia and leukocytosis.  CMP to evaluate for acute electrolyte abnormality, acute kidney injury, acute liver failure or dysfunction.  CT abdomen pelvis demonstrates findings concerning for partial small bowel obstruction.  I spoke to the radiologist at length however and they believe that the mesenteric swirling and small bowel dilation is unchanged from previous scans in April.  They think volvulus is unlikely and she will SBO is possible.  Dr. Marie of general surgery recommends admission to the hospital medicine service for observation and NG tube placement.    This dictation has been created using voice recognition software. I am continuously working with the software to minimize the number of voice recognition errors and I have made every attempt to manually correct the errors within my dictation. However errors  related to this voice recognition software may still exist and should be interpreted within the appropriate context.     Electronically signed by: Al Garcia M.D., 8/10/2023 3:48 AM      DISPOSITION AND DISCUSSIONS  I have discussed management of the patient with the following physicians and SANKET's: Dr. Ceron, Dr Marie    FINAL DIAGNOSIS  1. SBO  (small bowel obstruction) (HCC)           Electronically signed by: Al Garcia M.D., 8/10/2023 3:34 AM

## 2023-08-10 NOTE — ED TRIAGE NOTES
"Chief Complaint   Patient presents with    Bowel Obstruction     Transfer from [facility] for SBO with volvulus. Pt c/o abdominal pain for 4 days with N/V. Pt c/o 7/10 abdominal pain RLQ.     Pt BIB EMS for above complaint. Pt received 4mg Zofran and 50 mcg Fentanyl en-route.   Last PO intake 1900 - mac and cheese and water.     BP (!) 145/84   Pulse 66   Temp 36.3 °C (97.3 °F)   Resp 17   Ht 1.753 m (5' 9\")   Wt 88.9 kg (196 lb)   SpO2 92%   BMI 28.94 kg/m²     "

## 2023-08-10 NOTE — ASSESSMENT & PLAN NOTE
History of colon cancer s/p total colectomy with ileocolic anastamosis  CTAP noted mildly distended bowel loop with extensive in upper abdomen down to the rectum, twisting the bowel loop in the mid abdomen with swirling of the mesenteric vascular chair, and mild mesenteric edema suspicious for internal hernia or possible volvulus.    General surgery was consulted, recommended NGT placement.  IVF, serial abdominal exam, pain control   Patient is for SBFT

## 2023-08-10 NOTE — ED NOTES
Report called to CHRISTINA Quezada. Pt being taken upstairs at this time by Transport Tech via gurney on 2L O2 and with NG tube in place. Pt is awake and alert, talking to staff, in no apparent distress at time of transfer. Pt's paperwork and belongings sent upstairs with pt and Transport Tech.

## 2023-08-10 NOTE — ASSESSMENT & PLAN NOTE
Hx of FAP  SP total colectomy with ileocolic anastamosis  4 d hx of NV  CT shows SBO  NGT  Non op management  8/12 SBFT negative for obstruction.  -ADAT, surgery will sign off.

## 2023-08-10 NOTE — ED NOTES
Received call from [facility] regarding pt's CT scan. They wanted to fax over the official read as the one they sent with the pt was only the preliminary read. Once fax was received Hospitalist (REAGAN Barone) was called with this information. Report was read to him over the phone and paper copy was placed in pt's chart, per Hospitalist.

## 2023-08-10 NOTE — ED NOTES
Pt back in bed now and requesting pain medication for 7/10 RLQ abdominal pain. Pt medicated for pain per MAR and also given Zofran. Pt declines any other needs at this time. Pt also placed on 2L O2 via NC for borderline low SpO2 while dozing. Will continue to monitor pt while awaiting bed assignment upstairs.

## 2023-08-10 NOTE — CONSULTS
DATE OF CONSULTATION:  8/10/2023     REFERRING PHYSICIAN:   Al Garcia M.D.     CONSULTING PHYSICIAN:  Madeline Marie M.D.     REASON FOR CONSULTATION:  I have been asked by  to see the patient in surgical consultation for evaluation of SBO.    HISTORY OF PRESENT ILLNESS: The patient is a 33 year-old White man who presents to the Emergency Department with aWhite 4- day history of moderate epigastric abdominal pain. The pain is associated with nausea and vomiting. The patient denies any recent or intercurrent illness. The patient has undergone colectomy with ileocolic anastamosis.. The patient denies any previous surgery for obstructive symptoms.. He has had sx of SBO treated non operatively in remote past.     PAST MEDICAL HISTORY:  has a past medical history of Blood clotting disorder ([facility]) (2014), Cancer ([facility]) (dx 2010), Colon cancer ([facility]) (2011), Colon polyps, Dental disorder, Heart burn, Indigestion, Migraine, MRSA (methicillin resistant Staphylococcus aureus) (05/2011), Other specified disorder of intestines, Pain, Right ankle pain (08/03/2020), Seasonal allergies, and Snoring.    PAST SURGICAL HISTORY:  has a past surgical history that includes pr repair bladder wound/inj,complic (2007); arthroscopy, knee (Right, 02/28/2014); ileostomy (3/8/2011); colectomy (3/8/2011); ileostomy (5/3/2011); exploratory laparotomy (5/23/2011); ileostomy (5/23/2011); ileostomy (4/3/2012); knee arthrotomy (2007); knee arthroscopy (11/6/2014); open osteochondral allograft (11/6/2014); knee arthroscopy (Right, 2014); shoulder arthroscopy w/ rotator cuff repair (Right, 2018); tonsillectomy (06/2020); achilles tendon repair (Right, 8/6/2020); exostosis excision (Right, 8/6/2020); tendon transfer (Right, 8/6/2020); pr colonoscopy,diagnostic (N/A, 4/18/2023); and colonoscopy with apc (N/A, 4/18/2023).    ALLERGIES:   Allergies   Allergen Reactions    Hydrocodone-Acetaminophen Rash and Vomiting     Rash on neck and  nausea/vomiting.    Tomato Hives     Fresh only       CURRENT MEDICATIONS:    Home Medications       Reviewed by Ethel Barragan R.N. (Registered Nurse) on 08/10/23 at 0233  Med List Status: Not Addressed     Medication Last Dose Status   acetaminophen (TYLENOL) 500 MG Tab  Active                    FAMILY HISTORY: family history includes Cancer in his father, maternal grandmother, paternal grandmother, and another family member; Diabetes in his paternal grandmother.    SOCIAL HISTORY:  reports that he has never smoked. His smokeless tobacco use includes chew. He reports current alcohol use of about 4.2 - 8.4 oz of alcohol per week. He reports that he does not use drugs.    REVIEW OF SYSTEMS: Comprehensive review of systems is negative with the exception of the aforementioned HPI, PMH, and PSH bullets in accordance with CMS guidelines.    PHYSICAL EXAMINATION:    Physical Exam  Cardiovascular:      Rate and Rhythm: Tachycardia present.   Pulmonary:      Effort: Pulmonary effort is normal.   Abdominal:      Tenderness: There is abdominal tenderness. There is no rebound.      Comments: Midline scar   Musculoskeletal:         General: Normal range of motion.      Cervical back: Neck supple.   Skin:     General: Skin is warm.   Neurological:      General: No focal deficit present.      Mental Status: He is alert.         LABORATORY VALUES:                      IMAGING:   OUTSIDE IMAGES-CT ABDOMEN /PELVIS   Final Result          ASSESSMENT AND PLAN:     * SBO (small bowel obstruction) (HCC)- (present on admission)  Assessment & Plan  Hx of FAP  SP total colectomy with ileocolic anastamosis  4 d hx of NV  CT shows SBO  NGT  Non op management        DISPOSITION: Medical evaluation and admission. The patient was admitted to the Medical Service prior to surgical consultation. [facility] Service will follow.     ____________________________________     Madeline Marie M.D.    DD: 8/10/2023  4:00 AM    HUSSEIN  Grading System for EGS Conditions  ACS NSQIP Surgical Risk Calculator

## 2023-08-10 NOTE — ED NOTES
Med Rec complete per Pt at bedside. Pt reports he took 1 tab of older RX Tramadol @ 1830.  Allergies reviewed.  Home Pharmacy:  [facility]

## 2023-08-11 ENCOUNTER — APPOINTMENT (OUTPATIENT)
Dept: RADIOLOGY | Facility: MEDICAL CENTER | Age: 33
DRG: 390 | End: 2023-08-11
Attending: GENERAL PRACTICE
Payer: MEDICAID

## 2023-08-11 ENCOUNTER — APPOINTMENT (OUTPATIENT)
Dept: RADIOLOGY | Facility: MEDICAL CENTER | Age: 33
DRG: 390 | End: 2023-08-11
Attending: REGISTERED NURSE
Payer: MEDICAID

## 2023-08-11 LAB
ANION GAP SERPL CALC-SCNC: 10 MMOL/L (ref 7–16)
BASOPHILS # BLD AUTO: 0.4 % (ref 0–1.8)
BASOPHILS # BLD: 0.03 K/UL (ref 0–0.12)
BUN SERPL-MCNC: 14 MG/DL (ref 8–22)
CALCIUM SERPL-MCNC: 8.5 MG/DL (ref 8.5–10.5)
CHLORIDE SERPL-SCNC: 107 MMOL/L (ref 96–112)
CO2 SERPL-SCNC: 24 MMOL/L (ref 20–33)
CREAT SERPL-MCNC: 0.99 MG/DL (ref 0.5–1.4)
EOSINOPHIL # BLD AUTO: 0.01 K/UL (ref 0–0.51)
EOSINOPHIL NFR BLD: 0.1 % (ref 0–6.9)
ERYTHROCYTE [DISTWIDTH] IN BLOOD BY AUTOMATED COUNT: 41.5 FL (ref 35.9–50)
ERYTHROCYTE [DISTWIDTH] IN BLOOD BY AUTOMATED COUNT: 42.5 FL (ref 35.9–50)
GFR SERPLBLD CREATININE-BSD FMLA CKD-EPI: 102 ML/MIN/1.73 M 2
GLUCOSE SERPL-MCNC: 90 MG/DL (ref 65–99)
HCT VFR BLD AUTO: 39.1 % (ref 42–52)
HCT VFR BLD AUTO: 40 % (ref 42–52)
HGB BLD-MCNC: 12.5 G/DL (ref 14–18)
HGB BLD-MCNC: 12.9 G/DL (ref 14–18)
IMM GRANULOCYTES # BLD AUTO: 0.03 K/UL (ref 0–0.11)
IMM GRANULOCYTES NFR BLD AUTO: 0.4 % (ref 0–0.9)
LYMPHOCYTES # BLD AUTO: 0.47 K/UL (ref 1–4.8)
LYMPHOCYTES NFR BLD: 5.7 % (ref 22–41)
MCH RBC QN AUTO: 27 PG (ref 27–33)
MCH RBC QN AUTO: 27.1 PG (ref 27–33)
MCHC RBC AUTO-ENTMCNC: 32 G/DL (ref 32.3–36.5)
MCHC RBC AUTO-ENTMCNC: 32.3 G/DL (ref 32.3–36.5)
MCV RBC AUTO: 83.9 FL (ref 81.4–97.8)
MCV RBC AUTO: 84.8 FL (ref 81.4–97.8)
MONOCYTES # BLD AUTO: 0.56 K/UL (ref 0–0.85)
MONOCYTES NFR BLD AUTO: 6.8 % (ref 0–13.4)
NEUTROPHILS # BLD AUTO: 7.08 K/UL (ref 1.82–7.42)
NEUTROPHILS NFR BLD: 86.6 % (ref 44–72)
NRBC # BLD AUTO: 0 K/UL
NRBC BLD-RTO: 0 /100 WBC (ref 0–0.2)
PLATELET # BLD AUTO: 237 K/UL (ref 164–446)
PLATELET # BLD AUTO: 238 K/UL (ref 164–446)
PMV BLD AUTO: 9 FL (ref 9–12.9)
PMV BLD AUTO: 9.2 FL (ref 9–12.9)
POTASSIUM SERPL-SCNC: 4.3 MMOL/L (ref 3.6–5.5)
RBC # BLD AUTO: 4.61 M/UL (ref 4.7–6.1)
RBC # BLD AUTO: 4.77 M/UL (ref 4.7–6.1)
SODIUM SERPL-SCNC: 141 MMOL/L (ref 135–145)
WBC # BLD AUTO: 6.5 K/UL (ref 4.8–10.8)
WBC # BLD AUTO: 8.2 K/UL (ref 4.8–10.8)

## 2023-08-11 PROCEDURE — 36415 COLL VENOUS BLD VENIPUNCTURE: CPT

## 2023-08-11 PROCEDURE — 85027 COMPLETE CBC AUTOMATED: CPT

## 2023-08-11 PROCEDURE — 99285 EMERGENCY DEPT VISIT HI MDM: CPT

## 2023-08-11 PROCEDURE — C9113 INJ PANTOPRAZOLE SODIUM, VIA: HCPCS

## 2023-08-11 PROCEDURE — 96376 TX/PRO/DX INJ SAME DRUG ADON: CPT

## 2023-08-11 PROCEDURE — 700105 HCHG RX REV CODE 258: Performed by: GENERAL PRACTICE

## 2023-08-11 PROCEDURE — 85025 COMPLETE CBC W/AUTO DIFF WBC: CPT

## 2023-08-11 PROCEDURE — 700111 HCHG RX REV CODE 636 W/ 250 OVERRIDE (IP): Mod: JZ | Performed by: GENERAL PRACTICE

## 2023-08-11 PROCEDURE — 700111 HCHG RX REV CODE 636 W/ 250 OVERRIDE (IP)

## 2023-08-11 PROCEDURE — 700111 HCHG RX REV CODE 636 W/ 250 OVERRIDE (IP): Mod: JZ | Performed by: STUDENT IN AN ORGANIZED HEALTH CARE EDUCATION/TRAINING PROGRAM

## 2023-08-11 PROCEDURE — 99231 SBSQ HOSP IP/OBS SF/LOW 25: CPT | Performed by: REGISTERED NURSE

## 2023-08-11 PROCEDURE — 80048 BASIC METABOLIC PNL TOTAL CA: CPT

## 2023-08-11 PROCEDURE — 304538 HCHG NG TUBE

## 2023-08-11 PROCEDURE — 770006 HCHG ROOM/CARE - MED/SURG/GYN SEMI*

## 2023-08-11 PROCEDURE — 96375 TX/PRO/DX INJ NEW DRUG ADDON: CPT

## 2023-08-11 PROCEDURE — 700105 HCHG RX REV CODE 258: Performed by: STUDENT IN AN ORGANIZED HEALTH CARE EDUCATION/TRAINING PROGRAM

## 2023-08-11 PROCEDURE — 99232 SBSQ HOSP IP/OBS MODERATE 35: CPT | Performed by: GENERAL PRACTICE

## 2023-08-11 PROCEDURE — 96374 THER/PROPH/DIAG INJ IV PUSH: CPT

## 2023-08-11 RX ORDER — KETOROLAC TROMETHAMINE 30 MG/ML
15 INJECTION, SOLUTION INTRAMUSCULAR; INTRAVENOUS EVERY 6 HOURS PRN
Status: DISCONTINUED | OUTPATIENT
Start: 2023-08-11 | End: 2023-08-12 | Stop reason: HOSPADM

## 2023-08-11 RX ORDER — SODIUM CHLORIDE 9 MG/ML
INJECTION, SOLUTION INTRAVENOUS CONTINUOUS
Status: ACTIVE | OUTPATIENT
Start: 2023-08-11 | End: 2023-08-11

## 2023-08-11 RX ORDER — MORPHINE SULFATE 4 MG/ML
2 INJECTION INTRAVENOUS EVERY 4 HOURS PRN
Status: DISCONTINUED | OUTPATIENT
Start: 2023-08-11 | End: 2023-08-12 | Stop reason: HOSPADM

## 2023-08-11 RX ADMIN — SODIUM CHLORIDE: 9 INJECTION, SOLUTION INTRAVENOUS at 03:35

## 2023-08-11 RX ADMIN — MORPHINE SULFATE 2 MG: 4 INJECTION, SOLUTION INTRAMUSCULAR; INTRAVENOUS at 20:00

## 2023-08-11 RX ADMIN — MORPHINE SULFATE 4 MG: 4 INJECTION, SOLUTION INTRAMUSCULAR; INTRAVENOUS at 03:33

## 2023-08-11 RX ADMIN — ONDANSETRON 4 MG: 2 INJECTION INTRAMUSCULAR; INTRAVENOUS at 20:01

## 2023-08-11 RX ADMIN — PANTOPRAZOLE SODIUM 40 MG: 40 INJECTION, POWDER, FOR SOLUTION INTRAVENOUS at 06:11

## 2023-08-11 RX ADMIN — KETOROLAC TROMETHAMINE 15 MG: 30 INJECTION, SOLUTION INTRAMUSCULAR; INTRAVENOUS at 08:29

## 2023-08-11 RX ADMIN — SODIUM CHLORIDE: 9 INJECTION, SOLUTION INTRAVENOUS at 08:31

## 2023-08-11 RX ADMIN — MORPHINE SULFATE 2 MG: 4 INJECTION, SOLUTION INTRAMUSCULAR; INTRAVENOUS at 12:59

## 2023-08-11 RX ADMIN — SODIUM CHLORIDE: 9 INJECTION, SOLUTION INTRAVENOUS at 12:58

## 2023-08-11 RX ADMIN — ONDANSETRON 4 MG: 2 INJECTION INTRAMUSCULAR; INTRAVENOUS at 03:33

## 2023-08-11 RX ADMIN — ONDANSETRON 4 MG: 2 INJECTION INTRAMUSCULAR; INTRAVENOUS at 08:34

## 2023-08-11 ASSESSMENT — ENCOUNTER SYMPTOMS: ABDOMINAL PAIN: 1

## 2023-08-11 ASSESSMENT — PAIN DESCRIPTION - PAIN TYPE
TYPE: ACUTE PAIN

## 2023-08-11 ASSESSMENT — PATIENT HEALTH QUESTIONNAIRE - PHQ9
2. FEELING DOWN, DEPRESSED, IRRITABLE, OR HOPELESS: NOT AT ALL
1. LITTLE INTEREST OR PLEASURE IN DOING THINGS: NOT AT ALL
SUM OF ALL RESPONSES TO PHQ9 QUESTIONS 1 AND 2: 0

## 2023-08-11 NOTE — PROGRESS NOTES
Hospital Medicine Daily Progress Note    Date of Service  8/11/2023    Chief Complaint  Eddie Daniel is a 33 y.o. male admitted 8/10/2023 with abdominal pain    Hospital Course  This is a 33-year-old male with past medical history of colon cancer s/p total colectomy with ileocolic anastamosis who presented to the ED on 8/10/2023 with abdominal pain, nausea and vomiting x 4 days.    CTAP noted mildly distended bowel loop with extensive in upper abdomen down to the rectum, twisting the bowel loop in the mid abdomen with swirling of the mesenteric vascular chair, and mild mesenteric edema suspicious for internal hernia or possible volvulus.  General surgery was consulted, recommended NGT placement.  Patient is for small bowel follow-through.    Interval Problem Update  General surgery was consulted, recommended NGT placement.  Patient had about 500 cc output since placement.  Admits to flatus and small bowel movement.      Patient is for small bowel follow-through.    I have discussed this patient's plan of care and discharge plan at IDT rounds today with Case Management, Nursing, Nursing leadership, and other members of the IDT team.    Consultants/Specialty  general surgery    Code Status  Full Code    Disposition  The patient is not medically cleared for discharge to home or a post-acute facility.  Anticipate discharge to: home with close outpatient follow-up    I have placed the appropriate orders for post-discharge needs.    Review of Systems  Review of Systems   Gastrointestinal:  Positive for abdominal pain.   All other systems reviewed and are negative.       Physical Exam  Temp:  [36.1 °C (97 °F)-37.2 °C (99 °F)] 36.1 °C (97 °F)  Pulse:  [60-71] 71  Resp:  [16-18] 18  BP: (107-126)/(69-80) 120/80  SpO2:  [96 %-98 %] 98 %    Physical Exam  Vitals and nursing note reviewed.   Constitutional:       General: He is not in acute distress.     Appearance: Normal appearance.   HENT:      Head: Normocephalic  and atraumatic.      Nose:      Comments: NGT in place  Eyes:      Extraocular Movements: Extraocular movements intact.      Conjunctiva/sclera: Conjunctivae normal.      Pupils: Pupils are equal, round, and reactive to light.   Cardiovascular:      Rate and Rhythm: Normal rate and regular rhythm.      Pulses: Normal pulses.      Heart sounds: No murmur heard.     No friction rub. No gallop.   Pulmonary:      Effort: Pulmonary effort is normal. No respiratory distress.      Breath sounds: Normal breath sounds. No wheezing, rhonchi or rales.   Abdominal:      General: Bowel sounds are normal. There is no distension.      Palpations: Abdomen is soft.      Tenderness: There is abdominal tenderness.   Musculoskeletal:         General: No swelling or tenderness. Normal range of motion.      Cervical back: Normal range of motion and neck supple. No muscular tenderness.      Right lower leg: No edema.      Left lower leg: No edema.   Skin:     General: Skin is warm and dry.      Capillary Refill: Capillary refill takes less than 2 seconds.      Findings: No bruising, erythema or rash.   Neurological:      General: No focal deficit present.      Mental Status: He is alert and oriented to person, place, and time.         Fluids    Intake/Output Summary (Last 24 hours) at 8/11/2023 1527  Last data filed at 8/11/2023 0600  Gross per 24 hour   Intake 1000 ml   Output 100 ml   Net 900 ml       Laboratory  Recent Labs     08/10/23  0235 08/11/23  0652   WBC 4.8 6.5   RBC 5.21 4.77   HEMOGLOBIN 13.9* 12.9*   HEMATOCRIT 43.5 40.0*   MCV 83.5 83.9   MCH 26.7* 27.0   MCHC 32.0* 32.3   RDW 42.1 42.5   PLATELETCT 267 238   MPV 9.6 9.0     Recent Labs     08/10/23  0235 08/11/23  0652   SODIUM 139 141   POTASSIUM 4.1 4.3   CHLORIDE 105 107   CO2 24 24   GLUCOSE 105* 90   BUN 13 14   CREATININE 0.85 0.99   CALCIUM 8.9 8.5                   Imaging  DX-ABDOMEN FOR TUBE PLACEMENT   Final Result      1.  NG tube extends in the fundus the  stomach      OUTSIDE IMAGES-CT ABDOMEN /PELVIS   Final Result      DX-SMALL BOWEL SERIES    (Results Pending)        Assessment/Plan  * SBO (small bowel obstruction) (HCC)- (present on admission)  Assessment & Plan  History of colon cancer s/p total colectomy with ileocolic anastamosis  CTAP noted mildly distended bowel loop with extensive in upper abdomen down to the rectum, twisting the bowel loop in the mid abdomen with swirling of the mesenteric vascular chair, and mild mesenteric edema suspicious for internal hernia or possible volvulus.    General surgery was consulted, recommended NGT placement.  IVF, serial abdominal exam, pain control   Patient is for SBFT    History of colon cancer- (present on admission)  Assessment & Plan  Currently in remission         VTE prophylaxis:   SCDs/TEDs      I have performed a physical exam and reviewed and updated ROS and Plan today (8/11/2023). In review of yesterday's note (8/10/2023), there are no changes except as documented above.

## 2023-08-11 NOTE — CARE PLAN
The patient is Stable - Low risk of patient condition declining or worsening    Shift Goals  Clinical Goals: NPO  Patient Goals: Pain    Progress made toward(s) clinical / shift goals:    Problem: Pain - Standard  Goal: Alleviation of pain or a reduction in pain to the patient’s comfort goal  Description: Target End Date:  Prior to discharge or change in level of care    Document on Vitals flowsheet    1.  Document pain using the appropriate pain scale per order or unit policy  2.  Educate and implement non-pharmacologic comfort measures (i.e. relaxation, distraction, massage, cold/heat therapy, etc.)  3.  Pain management medications as ordered  4.  Reassess pain after pain med administration per policy  5.  If opiods administered assess patient's response to pain medication is appropriate per POSS sedation scale  6.  Follow pain management plan developed in collaboration with patient and interdisciplinary team (including palliative care or pain specialists if applicable)  Outcome: Progressing     Problem: Knowledge Deficit - Standard  Goal: Patient and family/care givers will demonstrate understanding of plan of care, disease process/condition, diagnostic tests and medications  Description: Target End Date:  1-3 days or as soon as patient condition allows    Document in Patient Education    1.  Patient and family/caregiver oriented to unit, equipment, visitation policy and means for communicating concern  2.  Complete/review Learning Assessment  3.  Assess knowledge level of disease process/condition, treatment plan, diagnostic tests and medications  4.  Explain disease process/condition, treatment plan, diagnostic tests and medications  Outcome: Progressing       Patient is not progressing towards the following goals:

## 2023-08-11 NOTE — PROGRESS NOTES
.4 Eyes Skin Assessment Completed by CHRISTINA Quezada and CHRISTINA Cheng.    Head WDL  Ears WDL  Nose WDL  Mouth WDL  Neck WDL  Breast/Chest WDL  Shoulder Blades WDL  Spine WDL  (R) Arm/Elbow/Hand WDL  (L) Arm/Elbow/Hand WDL  Abdomen WDL  Groin WDL  Scrotum/Coccyx/Buttocks WDL  (R) Leg WDL  (L) Leg WDL  (R) Heel/Foot/Toe WDL  (L) Heel/Foot/Toe WDL          Devices In Places OG/NG      Interventions In Place Pillows    Possible Skin Injury No    Pictures Uploaded Into Epic N/A  Wound Consult Placed N/A  RN Wound Prevention Protocol Ordered No

## 2023-08-11 NOTE — PROGRESS NOTES
Bedside report from ED RN, pt care assumed. VSS on 2L o2, pt assessment complete. Pt A&Ox4,  c/o 7/10 pain at this time. POC discussed with pt and verbalizes no questions. Pt denies any additional needs at this time. Bed locked and in lowest position, bed alarm off. Pt educated on fall risk and verbalized understanding, call light within reach, hourly rounding initiated.     NGT placement Xray to be able to flush due to clogged. Order placed. PRN pain medication given this shift. APRN notified of coffee ground NGT fluid. New orders

## 2023-08-11 NOTE — CARE PLAN
The patient is Stable - Low risk of patient condition declining or worsening    Shift Goals  Clinical Goals: monitor NG output.  check x ray for placement  Patient Goals: Pain    Progress made toward(s) clinical / shift goals:  Prn morphine and Zofran given.  Emesis x 1.  NG out put small.  X ray showed NG in stomach.      Problem: Pain - Standard  Goal: Alleviation of pain or a reduction in pain to the patient’s comfort goal  Outcome: Progressing       Patient is not progressing towards the following goals:

## 2023-08-11 NOTE — PROGRESS NOTES
Notified attending Bia Mcleod DO pt will be getting small bowel series tomorrow, radiology notified RN pt will need tomorrow, keep pt NPO, notified attending pt continues NPO with plan to do tomorrow.

## 2023-08-11 NOTE — CARE PLAN
The patient is Watcher - Medium risk of patient condition declining or worsening    Shift Goals  Clinical Goals: monitor NG output, clarify flushing needs  Patient Goals: pain managment, comfort with nausea/vomiting no happening anymore  Family Goals: efren    Progress made toward(s) clinical / shift goals:        Problem: Pain - Standard  Goal: Alleviation of pain or a reduction in pain to the patient’s comfort goal  Outcome: Progressing     Problem: Knowledge Deficit - Standard  Goal: Patient and family/care givers will demonstrate understanding of plan of care, disease process/condition, diagnostic tests and medications  Outcome: Progressing     Problem: Discharge Barriers/Planning  Goal: Patient's continuum of care needs are met  Outcome: Progressing     Problem: Mobility  Goal: Patient's capacity to carry out activities will improve  Outcome: Progressing     Problem: Self Care  Goal: Patient will have the ability to perform ADLs independently or with assistance (bathe, groom, dress, toilet and feed)  Outcome: Progressing       Patient is not progressing towards the following goals:      Problem: Nutrition  Goal: Patient's nutritional and fluid intake will be adequate or improve  Outcome: Not Progressing  Flowsheets (Taken 8/10/2023 1400 by Divya Jackson, C.N.A.)  Oral Nutrition: NPO at this Time     Problem: Bowel Elimination  Goal: Establish and maintain regular bowel function  Outcome: Not Progressing  Flowsheets (Taken 8/11/2023 0830)  Last BM: 08/11/23  Note: Pt states has multiple loose/soft bm everyday, noted one time bm today, pt awaiting small bowel series, notified provider radiology states has to do tomorrow, unable to do today, continues npo

## 2023-08-11 NOTE — PROGRESS NOTES
"  DATE: 8/11/2023    Hospital Day 2  small bowel obstruction .    INTERVAL EVENTS:  NG with gastric contents.  1 episode of emesis overnight.  +flatus +BM  SBFT pending.    PHYSICAL EXAMINATION:  Vital Signs: /80   Pulse 71   Temp 36.1 °C (97 °F) (Temporal)   Resp 18   Ht 1.753 m (5' 9\")   Wt 88.9 kg (196 lb)   SpO2 96%     Abdomen is soft, non distended, non tender.    LABORATORY VALUES:  Recent Labs     08/10/23  0235 08/11/23  0652   WBC 4.8 6.5   RBC 5.21 4.77   HEMOGLOBIN 13.9* 12.9*   HEMATOCRIT 43.5 40.0*   MCV 83.5 83.9   MCH 26.7* 27.0   MCHC 32.0* 32.3   RDW 42.1 42.5   PLATELETCT 267 238   MPV 9.6 9.0     Recent Labs     08/10/23  0235 08/11/23  0652   SODIUM 139 141   POTASSIUM 4.1 4.3   CHLORIDE 105 107   CO2 24 24   GLUCOSE 105* 90   BUN 13 14   CREATININE 0.85 0.99   CALCIUM 8.9 8.5     Recent Labs     08/10/23  0235   ASTSGOT 21   ALTSGPT 24   TBILIRUBIN 0.3   ALKPHOSPHAT 78   GLOBULIN 2.7            IMAGING:  DX-ABDOMEN FOR TUBE PLACEMENT   Final Result      1.  NG tube extends in the fundus the stomach      OUTSIDE IMAGES-CT ABDOMEN /PELVIS   Final Result      DX-SMALL BOWEL SERIES    (Results Pending)       ASSESSMENT AND PLAN:  * SBO (small bowel obstruction) (HCC)- (present on admission)  Assessment & Plan  Hx of FAP  SP total colectomy with ileocolic anastamosis  4 d hx of NV  CT shows SBO  NGT  Non op management  8/11 SBFT pending.           ____________________________________     DIMA Gurrola    DD: 8/11/2023  10:00 AM    "

## 2023-08-12 ENCOUNTER — APPOINTMENT (OUTPATIENT)
Dept: RADIOLOGY | Facility: MEDICAL CENTER | Age: 33
DRG: 390 | End: 2023-08-12
Attending: GENERAL PRACTICE
Payer: MEDICAID

## 2023-08-12 VITALS
HEART RATE: 91 BPM | RESPIRATION RATE: 18 BRPM | HEIGHT: 69 IN | SYSTOLIC BLOOD PRESSURE: 115 MMHG | WEIGHT: 196 LBS | TEMPERATURE: 98.5 F | DIASTOLIC BLOOD PRESSURE: 70 MMHG | OXYGEN SATURATION: 94 % | BODY MASS INDEX: 29.03 KG/M2

## 2023-08-12 LAB
ANION GAP SERPL CALC-SCNC: 11 MMOL/L (ref 7–16)
BUN SERPL-MCNC: 14 MG/DL (ref 8–22)
CALCIUM SERPL-MCNC: 8.9 MG/DL (ref 8.5–10.5)
CHLORIDE SERPL-SCNC: 104 MMOL/L (ref 96–112)
CO2 SERPL-SCNC: 27 MMOL/L (ref 20–33)
CREAT SERPL-MCNC: 1 MG/DL (ref 0.5–1.4)
GFR SERPLBLD CREATININE-BSD FMLA CKD-EPI: 101 ML/MIN/1.73 M 2
GLUCOSE SERPL-MCNC: 89 MG/DL (ref 65–99)
HCT VFR BLD AUTO: 38.7 % (ref 42–52)
HCT VFR BLD AUTO: 40.8 % (ref 42–52)
HGB BLD-MCNC: 12.1 G/DL (ref 14–18)
HGB BLD-MCNC: 13.2 G/DL (ref 14–18)
MAGNESIUM SERPL-MCNC: 2 MG/DL (ref 1.5–2.5)
PHOSPHATE SERPL-MCNC: 3.5 MG/DL (ref 2.5–4.5)
POTASSIUM SERPL-SCNC: 3.9 MMOL/L (ref 3.6–5.5)
SODIUM SERPL-SCNC: 142 MMOL/L (ref 135–145)

## 2023-08-12 PROCEDURE — 85018 HEMOGLOBIN: CPT | Mod: 91

## 2023-08-12 PROCEDURE — 85014 HEMATOCRIT: CPT | Mod: 91

## 2023-08-12 PROCEDURE — 83735 ASSAY OF MAGNESIUM: CPT

## 2023-08-12 PROCEDURE — 99239 HOSP IP/OBS DSCHRG MGMT >30: CPT | Performed by: GENERAL PRACTICE

## 2023-08-12 PROCEDURE — 700111 HCHG RX REV CODE 636 W/ 250 OVERRIDE (IP): Performed by: GENERAL PRACTICE

## 2023-08-12 PROCEDURE — 700111 HCHG RX REV CODE 636 W/ 250 OVERRIDE (IP)

## 2023-08-12 PROCEDURE — 700117 HCHG RX CONTRAST REV CODE 255: Performed by: GENERAL PRACTICE

## 2023-08-12 PROCEDURE — 84100 ASSAY OF PHOSPHORUS: CPT

## 2023-08-12 PROCEDURE — 80048 BASIC METABOLIC PNL TOTAL CA: CPT

## 2023-08-12 PROCEDURE — 36415 COLL VENOUS BLD VENIPUNCTURE: CPT

## 2023-08-12 PROCEDURE — 74250 X-RAY XM SM INT 1CNTRST STD: CPT

## 2023-08-12 PROCEDURE — C9113 INJ PANTOPRAZOLE SODIUM, VIA: HCPCS

## 2023-08-12 PROCEDURE — 700111 HCHG RX REV CODE 636 W/ 250 OVERRIDE (IP): Mod: JZ | Performed by: STUDENT IN AN ORGANIZED HEALTH CARE EDUCATION/TRAINING PROGRAM

## 2023-08-12 RX ADMIN — IOHEXOL 220 ML: 350 INJECTION, SOLUTION INTRAVENOUS at 09:40

## 2023-08-12 RX ADMIN — MORPHINE SULFATE 2 MG: 4 INJECTION, SOLUTION INTRAMUSCULAR; INTRAVENOUS at 05:37

## 2023-08-12 RX ADMIN — ONDANSETRON 4 MG: 2 INJECTION INTRAMUSCULAR; INTRAVENOUS at 05:37

## 2023-08-12 RX ADMIN — ONDANSETRON 4 MG: 2 INJECTION INTRAMUSCULAR; INTRAVENOUS at 01:20

## 2023-08-12 RX ADMIN — KETOROLAC TROMETHAMINE 15 MG: 30 INJECTION, SOLUTION INTRAMUSCULAR; INTRAVENOUS at 11:12

## 2023-08-12 RX ADMIN — MORPHINE SULFATE 2 MG: 4 INJECTION, SOLUTION INTRAMUSCULAR; INTRAVENOUS at 01:20

## 2023-08-12 RX ADMIN — PANTOPRAZOLE SODIUM 40 MG: 40 INJECTION, POWDER, FOR SOLUTION INTRAVENOUS at 05:38

## 2023-08-12 RX ADMIN — ONDANSETRON 4 MG: 2 INJECTION INTRAMUSCULAR; INTRAVENOUS at 11:15

## 2023-08-12 ASSESSMENT — PATIENT HEALTH QUESTIONNAIRE - PHQ9
SUM OF ALL RESPONSES TO PHQ9 QUESTIONS 1 AND 2: 0
1. LITTLE INTEREST OR PLEASURE IN DOING THINGS: NOT AT ALL
2. FEELING DOWN, DEPRESSED, IRRITABLE, OR HOPELESS: NOT AT ALL

## 2023-08-12 ASSESSMENT — PAIN DESCRIPTION - PAIN TYPE
TYPE: ACUTE PAIN
TYPE: ACUTE PAIN

## 2023-08-12 NOTE — PROGRESS NOTES
"  DATE: 8/12/2023    Hospital Day 3  small bowel obstruction .    INTERVAL EVENTS:  SBFT without evidence of obstruction.  ADAT, discontinue NG.  No surgical intervention indicated.  Surgery will sign off, please reach out with any questions.     PHYSICAL EXAMINATION:  Vital Signs: /69   Pulse 62   Temp 36.8 °C (98.2 °F) (Temporal)   Resp 16   Ht 1.753 m (5' 9\")   Wt 88.9 kg (196 lb)   SpO2 93%     Abdomen is soft, non distended, non tender. Reports flatus and bowel movement.     LABORATORY VALUES:  Recent Labs     08/10/23  0235 08/11/23  0652 08/11/23  2046 08/12/23  0839   WBC 4.8 6.5 8.2  --    RBC 5.21 4.77 4.61*  --    HEMOGLOBIN 13.9* 12.9* 12.5* 12.1*   HEMATOCRIT 43.5 40.0* 39.1* 38.7*   MCV 83.5 83.9 84.8  --    MCH 26.7* 27.0 27.1  --    MCHC 32.0* 32.3 32.0*  --    RDW 42.1 42.5 41.5  --    PLATELETCT 267 238 237  --    MPV 9.6 9.0 9.2  --      Recent Labs     08/10/23  0235 08/11/23  0652 08/12/23  0321   SODIUM 139 141 142   POTASSIUM 4.1 4.3 3.9   CHLORIDE 105 107 104   CO2 24 24 27   GLUCOSE 105* 90 89   BUN 13 14 14   CREATININE 0.85 0.99 1.00   CALCIUM 8.9 8.5 8.9     Recent Labs     08/10/23  0235   ASTSGOT 21   ALTSGPT 24   TBILIRUBIN 0.3   ALKPHOSPHAT 78   GLOBULIN 2.7            IMAGING:  DX-SMALL BOWEL SERIES   Final Result      1.  Negative for obstruction      2.  Prior total colectomy      3.  Contrast reaches the rectum or ileal pouch at 30 minutes      DX-ABDOMEN FOR TUBE PLACEMENT   Final Result      1.  NG tube extends in the fundus the stomach      OUTSIDE IMAGES-CT ABDOMEN /PELVIS   Final Result          ASSESSMENT AND PLAN:  * SBO (small bowel obstruction) (HCC)- (present on admission)  Assessment & Plan  Hx of FAP  SP total colectomy with ileocolic anastamosis  4 d hx of NV  CT shows SBO  NGT  Non op management  8/12 SBFT negative for obstruction.  -ADAT, surgery will sign off.          ____________________________________     DIMA Gurrola    DD: 8/11/2023 "  10:00 AM

## 2023-08-12 NOTE — PROGRESS NOTES
Communication with attending hospitalist Bia Mcleod, pt does not need to wait for diet upgrade, pt is able to discharge home prior to upgrade if there is no nausea/vomiting and pt is comfortable going home, pt states comfortable going home, tolerating with no nausea/vomiting

## 2023-08-12 NOTE — PROGRESS NOTES
RN noticed that pt's NG output is bloody looking than previous night shift.  /61 HR 75.  Also, noted that pt's NG tube is coming out every time he sneezed.  Reinforced NG with tapes.  Notified Yassine KIRK re: bloody NG output.

## 2023-08-12 NOTE — CARE PLAN
The patient is Watcher - Medium risk of patient condition declining or worsening    Shift Goals  Clinical Goals: monitor NG output, nausea control  Patient Goals:   Family Goals: efren    Progress made toward(s) clinical / shift goals:  NG output not bloody anymore.  Prn morphine given for abdominal pain.  Prn Zofran given with morphine.  Pt has been NPO for more than 2 days.  To have small bowel series today.      Problem: Pain - Standard  Goal: Alleviation of pain or a reduction in pain to the patient’s comfort goal  Outcome: Progressing       Patient is not progressing towards the following goals:      Problem: Nutrition  Goal: Patient's nutritional and fluid intake will be adequate or improve  Outcome: Not Progressing

## 2023-08-12 NOTE — PROGRESS NOTES
Communication with attending Bia Mcleod that output has increased from day shift yesterday overnight and that the drainage appears red which is new from yesterday. Notified pt small bowel series  with plan to be completed around noon per communications. Notified pt cannot be on low suction during small bowel series. Advised to notify surgery, will notify.

## 2023-08-12 NOTE — DISCHARGE SUMMARY
Discharge Summary    CHIEF COMPLAINT ON ADMISSION  Chief Complaint   Patient presents with    Bowel Obstruction     Transfer from [facility] for SBO with volvulus. Pt c/o abdominal pain for 4 days with N/V. Pt c/o 7/10 abdominal pain RLQ.       Reason for Admission  ems     Admission Date  8/10/2023    CODE STATUS  Full Code    HPI & HOSPITAL COURSE  This is a 33-year-old male with past medical history of colon cancer s/p total colectomy with ileocolic anastamosis who presented to the ED on 8/10/2023 with abdominal pain, nausea and vomiting x 4 days.    CTAP noted mildly distended bowel loop with extensive in upper abdomen down to the rectum, twisting the bowel loop in the mid abdomen with swirling of the mesenteric vascular chair, and mild mesenteric edema suspicious for internal hernia or possible volvulus.  General surgery was consulted, recommended NGT placement.  Patient is s/p SBFT - negative for obstruction.  Patient's diet advanced to clear liquid, full liquid with no evidence of nausea, vomiting or abdominal pain.  Patient to continue advancing his diet as tolerated.    Therefore, he is discharged in good and stable condition to home with close outpatient follow-up.    The patient met 2-midnight criteria for an inpatient stay at the time of discharge.    Discharge Date  8/12/2023    FOLLOW UP ITEMS POST DISCHARGE  Primary care physician    DISCHARGE DIAGNOSES  Principal Problem:    SBO (small bowel obstruction) (HCC) (POA: Yes)  Active Problems:    History of colon cancer (POA: Yes)  Resolved Problems:    * No resolved hospital problems. *      FOLLOW UP  [facility] ([facility]) - Primary Care and Family Medicine  [address] [place] [id number]  [phone]  Follow up      [place] [address] [facility] [address]  [phone]          MEDICATIONS ON DISCHARGE     Medication List      You have not been prescribed any medications.         Allergies  Allergies   Allergen  Reactions    Hydrocodone-Acetaminophen Rash and Vomiting     Rash on neck and nausea/vomiting.    Tomato Hives     Fresh only       DIET  Orders Placed This Encounter   Procedures    Diet Order Diet: Clear Liquid     Standing Status:   Standing     Number of Occurrences:   1     Order Specific Question:   Diet:     Answer:   Clear Liquid [10]       ACTIVITY  As tolerated.  Weight bearing as tolerated    CONSULTATIONS  General Surgery    PROCEDURES  None    LABORATORY  Lab Results   Component Value Date    SODIUM 142 08/12/2023    POTASSIUM 3.9 08/12/2023    CHLORIDE 104 08/12/2023    CO2 27 08/12/2023    GLUCOSE 89 08/12/2023    BUN 14 08/12/2023    CREATININE 1.00 08/12/2023    CREATININE 1.0 01/11/2007        Lab Results   Component Value Date    WBC 8.2 08/11/2023    HEMOGLOBIN 12.1 (L) 08/12/2023    HEMATOCRIT 38.7 (L) 08/12/2023    PLATELETCT 237 08/11/2023      DX-SMALL BOWEL SERIES   Final Result      1.  Negative for obstruction      2.  Prior total colectomy      3.  Contrast reaches the rectum or ileal pouch at 30 minutes      DX-ABDOMEN FOR TUBE PLACEMENT   Final Result      1.  NG tube extends in the fundus the stomach      OUTSIDE IMAGES-CT ABDOMEN /PELVIS   Final Result           Total time of the discharge process exceeds 45 minutes.

## 2023-08-25 ENCOUNTER — OFFICE VISIT (OUTPATIENT)
Dept: INTERNAL MEDICINE | Facility: OTHER | Age: 33
End: 2023-08-25
Payer: MEDICAID

## 2023-08-25 VITALS
DIASTOLIC BLOOD PRESSURE: 74 MMHG | SYSTOLIC BLOOD PRESSURE: 120 MMHG | WEIGHT: 202.6 LBS | HEART RATE: 85 BPM | OXYGEN SATURATION: 97 % | BODY MASS INDEX: 30.01 KG/M2 | TEMPERATURE: 97.7 F | HEIGHT: 69 IN

## 2023-08-25 DIAGNOSIS — Z76.89 ENCOUNTER TO ESTABLISH CARE: ICD-10-CM

## 2023-08-25 DIAGNOSIS — Z87.19 HISTORY OF SMALL BOWEL OBSTRUCTION: ICD-10-CM

## 2023-08-25 PROBLEM — Z78.9 ALCOHOL USE: Status: RESOLVED | Noted: 2022-05-26 | Resolved: 2023-08-25

## 2023-08-25 PROBLEM — F10.90 ALCOHOL USE: Status: RESOLVED | Noted: 2022-05-26 | Resolved: 2023-08-25

## 2023-08-25 PROCEDURE — 99204 OFFICE O/P NEW MOD 45 MIN: CPT | Mod: GC

## 2023-08-25 PROCEDURE — 3074F SYST BP LT 130 MM HG: CPT

## 2023-08-25 PROCEDURE — 3078F DIAST BP <80 MM HG: CPT

## 2023-08-25 ASSESSMENT — FIBROSIS 4 INDEX: FIB4 SCORE: 0.63

## 2023-08-25 NOTE — PROGRESS NOTES
Chief Complaint   Patient presents with    New Patient    Transitional Care Management Hospital Follow-up     Hospital follow up bowel obstruction       HPI: Eddie Daniel is a 33 y.o. male who presented to the clinic for the following.     #Recent bowel obstruction hospitalization follow-up # patient has a recent hospitalization because of suspected bowel obstruction, received NGT placement in the hospital, SBFT is negative for obstruction. Then he can gradually start diet, currently is on the normal diet as usual, no vomiting, bowel movement is normal.  However he still claims abdominal pain, locating major on the the right side of abdomen.  Describes the pain happens before the hospitalization and was getting worse, which is a major reason he went to the hospital.  At this moment the pain is feeling better, sometimes working or physical activities can increase the pain, sitting or laying down can make the pain better.  He has not seen his GI doctor since discharge and eager to know which kind of food he can eat to decrease the risk of obstruction in the future as he has the colon cancer surgery history and help him decrease his BMI.    #Establish Care# past medical history is significant for colon cancer and multiple orthopedic injuries.  Recent lipid profile showed triglyceride more than 600, this issue has not been addressed before.  Drinks alcohol occasionally, and agrees to receive a hepatitis C screening test.    Patient Active Problem List    Diagnosis Date Noted    SBO (small bowel obstruction) (HCC) 08/10/2023    Obesity (BMI 30-39.9) 04/17/2023    Rectal bleed 05/26/2022    Acute blood loss anemia 05/26/2022    Acute right ankle pain 05/15/2020    Acute pain of right knee 02/18/2020    Acute pain of right shoulder 11/25/2019    Ear fullness, right 11/25/2019    History of DVT (deep vein thrombosis) 11/25/2019    Tonsillar hypertrophy 11/25/2019    Environmental allergies 06/07/2019     "History of colon cancer 06/07/2019    Vasectomy evaluation 06/07/2019    Diarrhea 04/05/2019    Right rotator cuff tear 04/05/2019    GERD (gastroesophageal reflux disease) 04/05/2019    Family history of colon cancer 04/05/2019    Familial polyposis of colon 04/05/2019    Anxiety 04/05/2019    Internal derangement of knee 11/06/2014    History of small bowel obstruction 05/13/2014       No current outpatient medications on file.     No current facility-administered medications for this visit.       ROS: As per HPI. Additional pertinent systems as noted below.  Constitutional:  Negative for fever, chills   HENT:  Negative for ear pain, sore throat, runny nose   Eyes:  Negative for blurred vision and double vision   Cardiovascular:  Negative for chest pain, palpitations   Respiratory:  Negative for cough, sputum production, shortness of breath   Gastrointestinal: Positive for abdominal pain, negative for nausea, vomiting, diarrhea, or blood in stools.  Genitourinary: Negative for dysuria, flank pain and hematuria  Skin: Negative for rash, itching  Extremities: Negative for leg swelling   Neurologic: Negative for headaches, dizziness, seizures, weakness   Endo/Heme/Allergies: Negative for polydipsia. Does not bruise/bleed easily  Psychiatric: Negative for suicidal or homicidal ideas     /74 (BP Location: Right arm, Patient Position: Sitting, BP Cuff Size: Adult)   Pulse 85   Temp 36.5 °C (97.7 °F) (Temporal)   Ht 1.75 m (5' 8.9\")   Wt 91.9 kg (202 lb 9.6 oz)   SpO2 97%   BMI 30.01 kg/m²     Physical Exam   Constitutional:  Well developed, well nourished. Not in acute distress   HENT:  Normocephalic, Atraumatic, Oropharynx is pink and moist. No oral exudates, Nose normal   Eyes:  EOMI, Conjunctiva normal, No discharge. PERRLA   Neck:  Normal range of motion, No cervical lymphadenopathy. No thyromegaly.   Cardiovascular:  Regular rate and rhythm, No pedal edema, Intact distal pulses   Respiratory: Clear to " auscultation bilaterally, No use of accessory muscles   Gastrointestinal: Bowel sounds normal, Soft, positive for tenderness on the middle and right abdomen, No palpable masses/hepatosplenomegaly   Skin: Warm, Dry, No erythema, No rash, no induration.   Musculoskeletal: No cyanosis or clubbing, normal ROM   Neurologic: Alert & oriented x 4, No focal deficits noted, cranial nerves II through X are grossly intact.   Psychiatric: Judgment normal, Mood normal, Memory normal     Note: I have reviewed all pertinent labs and diagnostic tests associated with this visit with specific comments listed under the assessment and plan below    Assessment and Plan  1. History of small bowel obstruction   Recovered from the recent hospitalization because of suspected small bowel obstruction, still has some abdominal pain.  - CBC WITH DIFFERENTIAL; Future  - Referral to Nutrition Services  - Need follow up with his GI doctor     2. Encounter to establish care  Abnormal lipid profile: triglycerides more than 600.  Recheck lipid profile today, if it is still high,  may need fibrate medications  - HEP C VIRUS ANTIBODY; Future  - Lipid Profile; Future  - Follow up in 2 weeks     No problem-specific Assessment & Plan notes found for this encounter.      Followup: Return in about 2 weeks (around 9/8/2023).        Signed by: Alton Galarza M.D.  [facility] Med, PGY-1

## 2023-09-08 ENCOUNTER — APPOINTMENT (OUTPATIENT)
Dept: INTERNAL MEDICINE | Facility: OTHER | Age: 33
End: 2023-09-08
Payer: MEDICAID

## 2023-09-22 ENCOUNTER — APPOINTMENT (OUTPATIENT)
Dept: INTERNAL MEDICINE | Facility: OTHER | Age: 33
End: 2023-09-22
Payer: MEDICAID

## 2024-04-01 ENCOUNTER — HOSPITAL ENCOUNTER (EMERGENCY)
Facility: MEDICAL CENTER | Age: 34
End: 2024-04-01
Attending: EMERGENCY MEDICINE
Payer: MEDICAID

## 2024-04-01 ENCOUNTER — APPOINTMENT (OUTPATIENT)
Dept: RADIOLOGY | Facility: MEDICAL CENTER | Age: 34
End: 2024-04-01
Attending: EMERGENCY MEDICINE
Payer: MEDICAID

## 2024-04-01 VITALS
HEIGHT: 69 IN | WEIGHT: 195.99 LBS | BODY MASS INDEX: 29.03 KG/M2 | HEART RATE: 77 BPM | RESPIRATION RATE: 18 BRPM | SYSTOLIC BLOOD PRESSURE: 119 MMHG | TEMPERATURE: 97.6 F | DIASTOLIC BLOOD PRESSURE: 78 MMHG | OXYGEN SATURATION: 94 %

## 2024-04-01 DIAGNOSIS — R10.9 ABDOMINAL PAIN, UNSPECIFIED ABDOMINAL LOCATION: ICD-10-CM

## 2024-04-01 DIAGNOSIS — R11.2 NAUSEA AND VOMITING, UNSPECIFIED VOMITING TYPE: ICD-10-CM

## 2024-04-01 LAB
ALBUMIN SERPL BCP-MCNC: 5.5 G/DL (ref 3.2–4.9)
ALBUMIN/GLOB SERPL: 1.4 G/DL
ALP SERPL-CCNC: 124 U/L (ref 30–99)
ALT SERPL-CCNC: 24 U/L (ref 2–50)
ANION GAP SERPL CALC-SCNC: 16 MMOL/L (ref 7–16)
APPEARANCE UR: ABNORMAL
AST SERPL-CCNC: 25 U/L (ref 12–45)
BACTERIA #/AREA URNS HPF: NEGATIVE /HPF
BASOPHILS # BLD AUTO: 0.4 % (ref 0–1.8)
BASOPHILS # BLD: 0.05 K/UL (ref 0–0.12)
BILIRUB SERPL-MCNC: 0.6 MG/DL (ref 0.1–1.5)
BILIRUB UR QL STRIP.AUTO: ABNORMAL
BUN SERPL-MCNC: 19 MG/DL (ref 8–22)
CALCIUM ALBUM COR SERPL-MCNC: 9.1 MG/DL (ref 8.5–10.5)
CALCIUM SERPL-MCNC: 10.3 MG/DL (ref 8.5–10.5)
CHLORIDE SERPL-SCNC: 98 MMOL/L (ref 96–112)
CO2 SERPL-SCNC: 16 MMOL/L (ref 20–33)
COLOR UR: ABNORMAL
CREAT SERPL-MCNC: 1.36 MG/DL (ref 0.5–1.4)
EOSINOPHIL # BLD AUTO: 0.04 K/UL (ref 0–0.51)
EOSINOPHIL NFR BLD: 0.3 % (ref 0–6.9)
EPI CELLS #/AREA URNS HPF: ABNORMAL /HPF
ERYTHROCYTE [DISTWIDTH] IN BLOOD BY AUTOMATED COUNT: 41.1 FL (ref 35.9–50)
GFR SERPLBLD CREATININE-BSD FMLA CKD-EPI: 69 ML/MIN/1.73 M 2
GLOBULIN SER CALC-MCNC: 3.9 G/DL (ref 1.9–3.5)
GLUCOSE SERPL-MCNC: 121 MG/DL (ref 65–99)
GLUCOSE UR STRIP.AUTO-MCNC: NEGATIVE MG/DL
GRAN CASTS #/AREA URNS LPF: ABNORMAL /LPF
HCT VFR BLD AUTO: 54.3 % (ref 42–52)
HGB BLD-MCNC: 17.3 G/DL (ref 14–18)
HYALINE CASTS #/AREA URNS LPF: >20 /LPF
IMM GRANULOCYTES # BLD AUTO: 0.05 K/UL (ref 0–0.11)
IMM GRANULOCYTES NFR BLD AUTO: 0.4 % (ref 0–0.9)
KETONES UR STRIP.AUTO-MCNC: 15 MG/DL
LEUKOCYTE ESTERASE UR QL STRIP.AUTO: ABNORMAL
LIPASE SERPL-CCNC: 45 U/L (ref 11–82)
LYMPHOCYTES # BLD AUTO: 0.95 K/UL (ref 1–4.8)
LYMPHOCYTES NFR BLD: 6.9 % (ref 22–41)
MCH RBC QN AUTO: 26.3 PG (ref 27–33)
MCHC RBC AUTO-ENTMCNC: 31.9 G/DL (ref 32.3–36.5)
MCV RBC AUTO: 82.5 FL (ref 81.4–97.8)
MICRO URNS: ABNORMAL
MONOCYTES # BLD AUTO: 0.62 K/UL (ref 0–0.85)
MONOCYTES NFR BLD AUTO: 4.5 % (ref 0–13.4)
MUCOUS THREADS #/AREA URNS HPF: ABNORMAL /HPF
NEUTROPHILS # BLD AUTO: 12.01 K/UL (ref 1.82–7.42)
NEUTROPHILS NFR BLD: 87.5 % (ref 44–72)
NITRITE UR QL STRIP.AUTO: NEGATIVE
NRBC # BLD AUTO: 0 K/UL
NRBC BLD-RTO: 0 /100 WBC (ref 0–0.2)
PH UR STRIP.AUTO: 5 [PH] (ref 5–8)
PLATELET # BLD AUTO: 402 K/UL (ref 164–446)
PMV BLD AUTO: 9.2 FL (ref 9–12.9)
POTASSIUM SERPL-SCNC: 5.2 MMOL/L (ref 3.6–5.5)
PROT SERPL-MCNC: 9.4 G/DL (ref 6–8.2)
PROT UR QL STRIP: 300 MG/DL
RBC # BLD AUTO: 6.58 M/UL (ref 4.7–6.1)
RBC # URNS HPF: ABNORMAL /HPF
RBC UR QL AUTO: NEGATIVE
RENAL EPI CELLS #/AREA URNS HPF: ABNORMAL /HPF
SODIUM SERPL-SCNC: 130 MMOL/L (ref 135–145)
SP GR UR STRIP.AUTO: 1.03
UROBILINOGEN UR STRIP.AUTO-MCNC: 0.2 MG/DL
WBC # BLD AUTO: 13.7 K/UL (ref 4.8–10.8)
WBC #/AREA URNS HPF: ABNORMAL /HPF

## 2024-04-01 PROCEDURE — 99285 EMERGENCY DEPT VISIT HI MDM: CPT

## 2024-04-01 PROCEDURE — 83690 ASSAY OF LIPASE: CPT

## 2024-04-01 PROCEDURE — 96375 TX/PRO/DX INJ NEW DRUG ADDON: CPT

## 2024-04-01 PROCEDURE — 700105 HCHG RX REV CODE 258: Mod: UD | Performed by: EMERGENCY MEDICINE

## 2024-04-01 PROCEDURE — 96374 THER/PROPH/DIAG INJ IV PUSH: CPT

## 2024-04-01 PROCEDURE — 36415 COLL VENOUS BLD VENIPUNCTURE: CPT

## 2024-04-01 PROCEDURE — 700111 HCHG RX REV CODE 636 W/ 250 OVERRIDE (IP): Mod: JZ,UD | Performed by: EMERGENCY MEDICINE

## 2024-04-01 PROCEDURE — 700117 HCHG RX CONTRAST REV CODE 255: Mod: UD | Performed by: EMERGENCY MEDICINE

## 2024-04-01 PROCEDURE — 81001 URINALYSIS AUTO W/SCOPE: CPT

## 2024-04-01 PROCEDURE — 80053 COMPREHEN METABOLIC PANEL: CPT

## 2024-04-01 PROCEDURE — 74177 CT ABD & PELVIS W/CONTRAST: CPT

## 2024-04-01 PROCEDURE — 85025 COMPLETE CBC W/AUTO DIFF WBC: CPT

## 2024-04-01 RX ORDER — ONDANSETRON 2 MG/ML
4 INJECTION INTRAMUSCULAR; INTRAVENOUS ONCE
Status: COMPLETED | OUTPATIENT
Start: 2024-04-01 | End: 2024-04-01

## 2024-04-01 RX ORDER — ONDANSETRON 4 MG/1
4 TABLET, ORALLY DISINTEGRATING ORAL EVERY 6 HOURS PRN
Qty: 15 TABLET | Refills: 0 | Status: SHIPPED | OUTPATIENT
Start: 2024-04-01

## 2024-04-01 RX ORDER — SODIUM CHLORIDE 9 MG/ML
1000 INJECTION, SOLUTION INTRAVENOUS ONCE
Status: COMPLETED | OUTPATIENT
Start: 2024-04-01 | End: 2024-04-01

## 2024-04-01 RX ORDER — MORPHINE SULFATE 4 MG/ML
4 INJECTION INTRAVENOUS ONCE
Status: COMPLETED | OUTPATIENT
Start: 2024-04-01 | End: 2024-04-01

## 2024-04-01 RX ADMIN — MORPHINE SULFATE 4 MG: 4 INJECTION, SOLUTION INTRAMUSCULAR; INTRAVENOUS at 10:36

## 2024-04-01 RX ADMIN — IOHEXOL 96 ML: 350 INJECTION, SOLUTION INTRAVENOUS at 12:09

## 2024-04-01 RX ADMIN — ONDANSETRON 4 MG: 2 INJECTION INTRAMUSCULAR; INTRAVENOUS at 10:36

## 2024-04-01 RX ADMIN — SODIUM CHLORIDE 1000 ML: 9 INJECTION, SOLUTION INTRAVENOUS at 10:36

## 2024-04-01 ASSESSMENT — FIBROSIS 4 INDEX: FIB4 SCORE: 0.63
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
## 2024-04-01 NOTE — ED TRIAGE NOTES
"Chief Complaint   Patient presents with    Abdominal Pain     RUQ pain since last night around 2230. Diffuse abd pain throughout the day.  +N/V since this am 0230.   Diarrhea since yesterday morning.     Nausea/Vomiting/Diarrhea     Ambulatory to triage for above.   Protocol ordered.   Hx of colon CA 2010 charito.     /88   Pulse 98   Temp 35.8 °C (96.5 °F) (Temporal)   Resp 16   Ht 1.753 m (5' 9\")   Wt 88.9 kg (195 lb 15.8 oz)   SpO2 97%   BMI 28.94 kg/m²     "

## 2024-04-01 NOTE — ED NOTES
Patient back to 27 green. Patient placed on monitors. Ua collected and sent to lab. ERP at bedside.

## 2025-01-12 NOTE — ED NOTES
Face to face shift report received from Kuldeep RANDHAWA. Rounding completed, pt observed in lounge at the start of the shift.    Patient in lounge throughout the day socializing with peers. Alert and making needs known. Eating well for meals. Pleasant during conversation with this writer. Compliant with scheduled medication and nursing assessment. Denies anxiety, hallucinations, SI/HI, and pain. Frustrated being here while waiting for court but doing well overall. Attended group and spent the afternoon resting in bed.    Problem: Adult Behavioral Health Plan of Care  Goal: Patient-Specific Goal (Individualization)  Description: Patient will eat >75% of meals.   Patient will maintain ADLs without prompting.   Patient will attend >50% of groups when able.     Outcome: Progressing     Problem: Thought Process Alteration  Goal: Optimal Thought Clarity  Description: Patient will be able have a reality based conversation by discharge.   Patient will be free of paranoia by discharge.  Patient will sleep >6 hours of sleep per night.   Outcome: Progressing    Face to face report will be communicated to oncoming RN.    Daily Mann RN  1/12/2025                           XR at bedside  Patient medicated per MAR, tolerated well, VSS, C Spine cleared by ERP and collar removed at this time.

## 2025-03-14 ENCOUNTER — OFFICE VISIT (OUTPATIENT)
Dept: MEDICAL GROUP | Facility: LAB | Age: 35
End: 2025-03-14
Payer: COMMERCIAL

## 2025-03-14 VITALS
WEIGHT: 198.41 LBS | HEIGHT: 69 IN | HEART RATE: 72 BPM | RESPIRATION RATE: 16 BRPM | TEMPERATURE: 98.1 F | DIASTOLIC BLOOD PRESSURE: 68 MMHG | BODY MASS INDEX: 29.39 KG/M2 | SYSTOLIC BLOOD PRESSURE: 126 MMHG | OXYGEN SATURATION: 96 %

## 2025-03-14 DIAGNOSIS — S09.90XS HEADACHES DUE TO OLD HEAD INJURY: ICD-10-CM

## 2025-03-14 DIAGNOSIS — Z23 NEED FOR VACCINATION: ICD-10-CM

## 2025-03-14 DIAGNOSIS — Z11.4 ENCOUNTER FOR SCREENING FOR HIV: ICD-10-CM

## 2025-03-14 DIAGNOSIS — Z11.59 NEED FOR HEPATITIS C SCREENING TEST: ICD-10-CM

## 2025-03-14 DIAGNOSIS — Z13.1 DIABETES MELLITUS SCREENING: ICD-10-CM

## 2025-03-14 DIAGNOSIS — F32.A DEPRESSION, UNSPECIFIED DEPRESSION TYPE: ICD-10-CM

## 2025-03-14 DIAGNOSIS — Z85.038 HISTORY OF COLON CANCER: ICD-10-CM

## 2025-03-14 DIAGNOSIS — Z87.820 HISTORY OF BRAIN CONCUSSION: ICD-10-CM

## 2025-03-14 DIAGNOSIS — Z76.89 ENCOUNTER TO ESTABLISH CARE: ICD-10-CM

## 2025-03-14 DIAGNOSIS — G44.309 HEADACHES DUE TO OLD HEAD INJURY: ICD-10-CM

## 2025-03-14 DIAGNOSIS — Z13.29 SCREENING FOR THYROID DISORDER: ICD-10-CM

## 2025-03-14 DIAGNOSIS — Z13.220 LIPID SCREENING: ICD-10-CM

## 2025-03-14 PROCEDURE — 99204 OFFICE O/P NEW MOD 45 MIN: CPT | Mod: 25 | Performed by: PHYSICIAN ASSISTANT

## 2025-03-14 PROCEDURE — 90471 IMMUNIZATION ADMIN: CPT | Performed by: PHYSICIAN ASSISTANT

## 2025-03-14 PROCEDURE — 90472 IMMUNIZATION ADMIN EACH ADD: CPT | Performed by: PHYSICIAN ASSISTANT

## 2025-03-14 PROCEDURE — 90677 PCV20 VACCINE IM: CPT | Performed by: PHYSICIAN ASSISTANT

## 2025-03-14 PROCEDURE — 90632 HEPA VACCINE ADULT IM: CPT | Mod: JZ | Performed by: PHYSICIAN ASSISTANT

## 2025-03-14 RX ORDER — SERTRALINE HYDROCHLORIDE 25 MG/1
25 TABLET, FILM COATED ORAL DAILY
Qty: 30 TABLET | Refills: 1 | Status: SHIPPED | OUTPATIENT
Start: 2025-03-14

## 2025-03-14 ASSESSMENT — PATIENT HEALTH QUESTIONNAIRE - PHQ9
5. POOR APPETITE OR OVEREATING: 1 - SEVERAL DAYS
SUM OF ALL RESPONSES TO PHQ QUESTIONS 1-9: 12
CLINICAL INTERPRETATION OF PHQ2 SCORE: 4

## 2025-03-14 ASSESSMENT — FIBROSIS 4 INDEX: FIB4 SCORE: 0.46
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
## 2025-03-20 NOTE — Clinical Note
REFERRAL APPROVAL NOTICE         Sent on March 19, 2025                   Eddie Daniel  [address]  [place] [address]                   Dear Mr. Daniel,    After a careful review of the medical information and benefit coverage, [facility] has processed your referral. See below for additional details.    If applicable, you must be actively enrolled with your insurance for coverage of the authorized service. If you have any questions regarding your coverage, please contact your insurance directly.    REFERRAL INFORMATION   Referral #:  [id number]  Referred-To Provider    Referred-By Provider:  Behavioral Health    BJ Jaramillo, BRIANNA MICHAELA      [address]  [place] [address]  [phone] [address]  [place] [address]  [phone]    Referral Start Date:  03/14/2025  Referral End Date:   03/14/2026             SCHEDULING  If you do not already have an appointment, please call [phone] to make an appointment.     MORE INFORMATION  If you do not already have a [employer] account, sign up at: [email or web address]  You can access your medical information, make appointments, see lab results, billing information, and more.  If you have questions regarding this referral, please contact  the [facility] Referrals department at:             [phone]. Monday - Friday 8:00AM - 5:00PM.     Sincerely,    [facility]

## 2025-03-20 NOTE — Clinical Note
REFERRAL APPROVAL NOTICE         Sent on March 19, 2025                   Eddie Daniel  [address]  [place] [address]                   Dear Mr. Daniel,    After a careful review of the medical information and benefit coverage, [facility] has processed your referral. See below for additional details.    If applicable, you must be actively enrolled with your insurance for coverage of the authorized service. If you have any questions regarding your coverage, please contact your insurance directly.    REFERRAL INFORMATION   Referral #:  [id number]  Referred-To Provider    Referred-By Provider:  Gastroenterology    Jolynn Ospina P.A.-C.   [facility]      [address]  [place] [address]  [phone] [address]  [place] [address]  [phone]    Referral Start Date:  03/14/2025  Referral End Date:   03/14/2026             SCHEDULING  If you do not already have an appointment, please call [phone] to make an appointment.     MORE INFORMATION  If you do not already have a [facility] account, sign up at: [email or web address]  You can access your medical information, make appointments, see lab results, billing information, and more.  If you have questions regarding this referral, please contact  the [facility] Referrals department at:             [phone]. Monday - Friday 8:00AM - 5:00PM.     Sincerely,    [facility]

## 2025-03-20 NOTE — Clinical Note
REFERRAL APPROVAL NOTICE         Sent on March 19, 2025                   Eddie Daniel  [address]  [place] [address]                   Dear Mr. Daniel,    After a careful review of the medical information and benefit coverage, [facility] has processed your referral. See below for additional details.    If applicable, you must be actively enrolled with your insurance for coverage of the authorized service. If you have any questions regarding your coverage, please contact your insurance directly.    REFERRAL INFORMATION   Referral #:  [id number]  Referred-To Department    Referred-By Provider:  Neurology    Jolynn Ospina P.A.-C.   Neurology [facility]      [address]  [place] [id number] [phone] [address]  [place] [address] [phone]    Referral Start Date:  03/14/2025  Referral End Date:   03/14/2026           SCHEDULING  If you do not already have an appointment, please call [phone] to make an appointment.   MORE INFORMATION  As a reminder, [facility] ownership has changed, meaning this location is now owned and operated by [facility]. As such, we want to clarify that our patients should expect to receive two separate bills for the services received at [facility] - one representing the [facility] facility fees as the owner of the establishment, and the other to represent the physician's services and subsequent fees. You can speak with your insurance carrier for a pricing estimate by calling the customer service number on the back of your card and ask about charges for a hospital outpatient visit.  If you do not already have a [employer] account, sign up at: [email or web address]  You can access your medical information, make appointments, see lab results, billing information, and more.  If you have questions regarding this referral, please contact  the [facility] Referrals department at:             [phone].  Monday - Friday 7:30AM - 5:00PM.      Sincerely,  [facility]

## 2025-04-02 ENCOUNTER — TELEPHONE (OUTPATIENT)
Dept: HEALTH INFORMATION MANAGEMENT | Facility: OTHER | Age: 35
End: 2025-04-02
Payer: COMMERCIAL

## 2025-04-18 ENCOUNTER — APPOINTMENT (OUTPATIENT)
Dept: MEDICAL GROUP | Facility: LAB | Age: 35
End: 2025-04-18
Payer: COMMERCIAL

## 2025-05-07 ENCOUNTER — HOSPITAL ENCOUNTER (OUTPATIENT)
Dept: LAB | Facility: MEDICAL CENTER | Age: 35
End: 2025-05-07
Attending: PHYSICIAN ASSISTANT
Payer: COMMERCIAL

## 2025-05-07 DIAGNOSIS — Z13.1 DIABETES MELLITUS SCREENING: ICD-10-CM

## 2025-05-07 DIAGNOSIS — Z13.220 LIPID SCREENING: ICD-10-CM

## 2025-05-07 DIAGNOSIS — Z11.4 ENCOUNTER FOR SCREENING FOR HIV: ICD-10-CM

## 2025-05-07 DIAGNOSIS — Z11.59 NEED FOR HEPATITIS C SCREENING TEST: ICD-10-CM

## 2025-05-07 DIAGNOSIS — Z13.29 SCREENING FOR THYROID DISORDER: ICD-10-CM

## 2025-05-07 LAB
ALBUMIN SERPL BCP-MCNC: 4.1 G/DL (ref 3.2–4.9)
ALBUMIN/GLOB SERPL: 1.5 G/DL
ALP SERPL-CCNC: 77 U/L (ref 30–99)
ALT SERPL-CCNC: 24 U/L (ref 2–50)
ANION GAP SERPL CALC-SCNC: 7 MMOL/L (ref 7–16)
AST SERPL-CCNC: 22 U/L (ref 12–45)
BASOPHILS # BLD AUTO: 0.8 % (ref 0–1.8)
BASOPHILS # BLD: 0.04 K/UL (ref 0–0.12)
BILIRUB SERPL-MCNC: 0.4 MG/DL (ref 0.1–1.5)
BUN SERPL-MCNC: 12 MG/DL (ref 8–22)
CALCIUM ALBUM COR SERPL-MCNC: 8.7 MG/DL (ref 8.5–10.5)
CALCIUM SERPL-MCNC: 8.8 MG/DL (ref 8.5–10.5)
CHLORIDE SERPL-SCNC: 105 MMOL/L (ref 96–112)
CHOLEST SERPL-MCNC: 172 MG/DL (ref 100–199)
CO2 SERPL-SCNC: 25 MMOL/L (ref 20–33)
CREAT SERPL-MCNC: 0.96 MG/DL (ref 0.5–1.4)
EOSINOPHIL # BLD AUTO: 0.16 K/UL (ref 0–0.51)
EOSINOPHIL NFR BLD: 3 % (ref 0–6.9)
ERYTHROCYTE [DISTWIDTH] IN BLOOD BY AUTOMATED COUNT: 42.4 FL (ref 35.9–50)
EST. AVERAGE GLUCOSE BLD GHB EST-MCNC: 117 MG/DL
FASTING STATUS PATIENT QL REPORTED: NORMAL
GFR SERPLBLD CREATININE-BSD FMLA CKD-EPI: 104 ML/MIN/1.73 M 2
GLOBULIN SER CALC-MCNC: 2.8 G/DL (ref 1.9–3.5)
GLUCOSE SERPL-MCNC: 95 MG/DL (ref 65–99)
HBA1C MFR BLD: 5.7 % (ref 4–5.6)
HCT VFR BLD AUTO: 40.8 % (ref 42–52)
HCV AB SER QL: NORMAL
HDLC SERPL-MCNC: 34 MG/DL
HGB BLD-MCNC: 13 G/DL (ref 14–18)
HIV 1+2 AB+HIV1 P24 AG SERPL QL IA: NORMAL
IMM GRANULOCYTES # BLD AUTO: 0.02 K/UL (ref 0–0.11)
IMM GRANULOCYTES NFR BLD AUTO: 0.4 % (ref 0–0.9)
LDLC SERPL CALC-MCNC: 106 MG/DL
LYMPHOCYTES # BLD AUTO: 1.28 K/UL (ref 1–4.8)
LYMPHOCYTES NFR BLD: 24.2 % (ref 22–41)
MCH RBC QN AUTO: 26.4 PG (ref 27–33)
MCHC RBC AUTO-ENTMCNC: 31.9 G/DL (ref 32.3–36.5)
MCV RBC AUTO: 82.8 FL (ref 81.4–97.8)
MONOCYTES # BLD AUTO: 0.53 K/UL (ref 0–0.85)
MONOCYTES NFR BLD AUTO: 10 % (ref 0–13.4)
NEUTROPHILS # BLD AUTO: 3.27 K/UL (ref 1.82–7.42)
NEUTROPHILS NFR BLD: 61.6 % (ref 44–72)
NRBC # BLD AUTO: 0 K/UL
NRBC BLD-RTO: 0 /100 WBC (ref 0–0.2)
PLATELET # BLD AUTO: 291 K/UL (ref 164–446)
PMV BLD AUTO: 9.7 FL (ref 9–12.9)
POTASSIUM SERPL-SCNC: 4.3 MMOL/L (ref 3.6–5.5)
PROT SERPL-MCNC: 6.9 G/DL (ref 6–8.2)
RBC # BLD AUTO: 4.93 M/UL (ref 4.7–6.1)
SODIUM SERPL-SCNC: 137 MMOL/L (ref 135–145)
TRIGL SERPL-MCNC: 159 MG/DL (ref 0–149)
TSH SERPL DL<=0.005 MIU/L-ACNC: 0.87 UIU/ML (ref 0.38–5.33)
WBC # BLD AUTO: 5.3 K/UL (ref 4.8–10.8)

## 2025-05-07 PROCEDURE — 87389 HIV-1 AG W/HIV-1&-2 AB AG IA: CPT

## 2025-05-07 PROCEDURE — 80061 LIPID PANEL: CPT

## 2025-05-07 PROCEDURE — 80053 COMPREHEN METABOLIC PANEL: CPT

## 2025-05-07 PROCEDURE — 36415 COLL VENOUS BLD VENIPUNCTURE: CPT

## 2025-05-07 PROCEDURE — 86803 HEPATITIS C AB TEST: CPT

## 2025-05-07 PROCEDURE — 84443 ASSAY THYROID STIM HORMONE: CPT

## 2025-05-07 PROCEDURE — 83036 HEMOGLOBIN GLYCOSYLATED A1C: CPT

## 2025-05-07 PROCEDURE — 85025 COMPLETE CBC W/AUTO DIFF WBC: CPT

## 2025-05-09 ENCOUNTER — OFFICE VISIT (OUTPATIENT)
Dept: MEDICAL GROUP | Facility: LAB | Age: 35
End: 2025-05-09
Payer: COMMERCIAL

## 2025-05-09 VITALS
TEMPERATURE: 97.5 F | RESPIRATION RATE: 15 BRPM | DIASTOLIC BLOOD PRESSURE: 62 MMHG | WEIGHT: 190 LBS | BODY MASS INDEX: 28.14 KG/M2 | OXYGEN SATURATION: 97 % | HEIGHT: 69 IN | HEART RATE: 64 BPM | SYSTOLIC BLOOD PRESSURE: 104 MMHG

## 2025-05-09 DIAGNOSIS — E78.5 DYSLIPIDEMIA: ICD-10-CM

## 2025-05-09 DIAGNOSIS — R73.03 PREDIABETES: ICD-10-CM

## 2025-05-09 DIAGNOSIS — D50.9 MICROCYTIC ANEMIA: ICD-10-CM

## 2025-05-09 DIAGNOSIS — F32.A DEPRESSION, UNSPECIFIED DEPRESSION TYPE: ICD-10-CM

## 2025-05-09 PROCEDURE — 3074F SYST BP LT 130 MM HG: CPT | Performed by: PHYSICIAN ASSISTANT

## 2025-05-09 PROCEDURE — 99214 OFFICE O/P EST MOD 30 MIN: CPT | Performed by: PHYSICIAN ASSISTANT

## 2025-05-09 PROCEDURE — 3078F DIAST BP <80 MM HG: CPT | Performed by: PHYSICIAN ASSISTANT

## 2025-05-09 ASSESSMENT — PATIENT HEALTH QUESTIONNAIRE - PHQ9
SUM OF ALL RESPONSES TO PHQ QUESTIONS 1-9: 12
5. POOR APPETITE OR OVEREATING: 1 - SEVERAL DAYS
CLINICAL INTERPRETATION OF PHQ2 SCORE: 4

## 2025-05-09 ASSESSMENT — ANXIETY QUESTIONNAIRES
IF YOU CHECKED OFF ANY PROBLEMS ON THIS QUESTIONNAIRE, HOW DIFFICULT HAVE THESE PROBLEMS MADE IT FOR YOU TO DO YOUR WORK, TAKE CARE OF THINGS AT HOME, OR GET ALONG WITH OTHER PEOPLE: SOMEWHAT DIFFICULT
2. NOT BEING ABLE TO STOP OR CONTROL WORRYING: MORE THAN HALF THE DAYS
7. FEELING AFRAID AS IF SOMETHING AWFUL MIGHT HAPPEN: SEVERAL DAYS
5. BEING SO RESTLESS THAT IT IS HARD TO SIT STILL: SEVERAL DAYS
1. FEELING NERVOUS, ANXIOUS, OR ON EDGE: MORE THAN HALF THE DAYS
4. TROUBLE RELAXING: SEVERAL DAYS
GAD7 TOTAL SCORE: 10
6. BECOMING EASILY ANNOYED OR IRRITABLE: SEVERAL DAYS
3. WORRYING TOO MUCH ABOUT DIFFERENT THINGS: MORE THAN HALF THE DAYS

## 2025-05-09 ASSESSMENT — FIBROSIS 4 INDEX: FIB4 SCORE: 0.56

## 2025-05-09 NOTE — PROGRESS NOTES
Subjective:     CC: Follow-up on depression    HPI:   Eddie here today with   Verbal consent was acquired by the patient to use "[employer] ambient listening note generation during this visit Yes     History of Present Illness  The patient presents for follow-up on lab work and depression.    Depression  -Previously evaluated in clinic 03/14/2025 for depressive symptoms, started on Zoloft 25 mg and referred to behavioral health to establish with a therapist/counselor  - Reports positive response to Zoloft 25 mg without side effects.  - Requests dosage increase to 50 mg.  - Symptoms have improved, and they are actively managing their condition through lifestyle changes.  - Attempted to contact a counselor without success.  - Open to counseling services at this facility rather than in [place]    Intermittent bright red blood in stool  - Reports varying severity, with some days blood-free.  - History of anemia predates colon cancer diagnosis.  - Due for repeat colonoscopy this year.  - Needs to schedule colonoscopy as GI department has not contacted them following referral.    Elevated blood sugar  - Admits to consuming soda and sugary drinks.  - Claims to significantly reduce intake of sodas and energy drinks.    Supplemental information: None provided.         5/9/2025     8:00 AM 3/14/2025    11:20 AM 5/15/2020     4:20 PM 4/5/2019     4:40 PM   PHQ-9 Screening   Little interest or pleasure in doing things 3 - nearly every day 2 - more than half the days 0 - not at all 0 - not at all   Feeling down, depressed, or hopeless 1 - several days 2 - more than half the days 0 - not at all 0 - not at all   Trouble falling or staying asleep, or sleeping too much 2 - more than half the days 3 - nearly every day     Feeling tired or having little energy 1 - several days 3 - nearly every day     Poor appetite or overeating 1 - several days 1 - several days     Feeling bad about yourself - or that you are a failure or have let  "yourself or your family down 1 - several days 0 - not at all     Trouble concentrating on things, such as reading the newspaper or watching television 1 - several days 0 - not at all     Moving or speaking so slowly that other people could have noticed. Or the opposite - being so fidgety or restless that you have been moving around a lot more than usual 1 - several days 0 - not at all     Thoughts that you would be better off dead, or of hurting yourself in some way 1 - several days 1 - several days     PHQ-2 Total Score 4 4 0 0   PHQ-9 Total Score 12 12         Interpretation of PHQ-9 Total Score   Score Severity   1-4 No Depression   5-9 Mild Depression   10-14 Moderate Depression   15-19 Moderately Severe Depression   20-27 Severe Depression      ROS:  Gen: no fevers/chills, no changes in weight  Eyes: no changes in vision  ENT: no sore throat, no hearing loss, no bloody nose  Pulm: no sob, no cough  CV: no chest pain, no palpitations  GI: no nausea/vomiting, no diarrhea  : no dysuria  MSk: no myalgias  Skin: no rash  Neuro: no headaches, no numbness/tingling  Heme/Lymph: no easy bruising    Current Outpatient Medications Ordered in Epic   Medication Sig Dispense Refill    sertraline (ZOLOFT) 50 MG Tab Take 1 Tablet by mouth every day. 90 Tablet 1     No current Epic-ordered facility-administered medications on file.       Objective:     Exam:  /62 (BP Location: Right arm, Patient Position: Sitting, BP Cuff Size: Large adult)   Pulse 64   Temp 36.4 °C (97.5 °F) (Temporal)   Resp 15   Ht 1.753 m (5' 9.02\")   Wt 86.2 kg (190 lb)   SpO2 97%   BMI 28.05 kg/m²  Body mass index is 28.05 kg/m².    Gen: Alert and oriented, No apparent distress.  Neck: Neck is supple without lymphadenopathy.  Lungs: Normal effort, CTA bilaterally, no wheezes, rhonchi, or rales  CV: Regular rate and rhythm. No murmurs, rubs, or gallops.  Ext: No clubbing, cyanosis, edema.      Assessment & Plan:     34 y.o. male with the " following -     1. Depression, unspecified depression type  sertraline (ZOLOFT) 50 MG Tab    Referral to Behavioral Health      2. Microcytic anemia  CBC WITHOUT DIFFERENTIAL    IRON/TOTAL IRON BIND      3. Prediabetes  HEMOGLOBIN A1C      4. Dyslipidemia  Lipid Profile            Assessment & Plan  1. Depression  Improved symptoms with sertraline 25 mg; no side effects.  Treatment plan: Behavioral health referral initiated. Increased dosage to 50 mg; prescription sent.    2. Anemia  Mild anemia with microcytosis, likely iron deficiency, chronic blood loss  Treatment plan: Start ferrous sulfate 325 mg daily and increase iron-rich foods. Reports bright red blood in stool; due for repeat colonoscopy this year.  Patient will contact GI to schedule.    3. Elevated blood sugar  A1c slightly elevated, likely dietary-related.  Treatment plan: Reduce sugary drinks; increase fruits, vegetables, fiber, and cardiovascular exercise. Follow-up labs in 3-6 months.    4. Hyperlipidemia  Cholesterol slightly elevated; triglycerides and LDL just outside goal; HDL slightly low.  Treatment plan: Dietary changes and regular cardiovascular exercise advised. Follow-up labs in 3-6 months.    Follow-up: 1 month.      I spent a total of 15 minutes with record review, exam, communication with the patient, communication with other providers, and documentation of this encounter.      No follow-ups on file.    Please note that this dictation was created using voice recognition software. I have made every reasonable attempt to correct obvious errors, but there may be errors of grammar and possibly content that I did not discover before finalizing the note.

## 2025-05-15 NOTE — Clinical Note
REFERRAL APPROVAL NOTICE         Sent on May 15, 2025                   Eddie Daniel  [address]  [place] [address]                   Dear Mr. Daniel,    After a careful review of the medical information and benefit coverage, [facility] has processed your referral. See below for additional details.    If applicable, you must be actively enrolled with your insurance for coverage of the authorized service. If you have any questions regarding your coverage, please contact your insurance directly.    REFERRAL INFORMATION   Referral #:  [id number]  Referred-To Provider    Referred-By Provider:  Behavioral Health    Jolynn Ospina P.A.-C.   [facility]      [address]  [place] [address]  [phone] [address]  [place] [address]  [phone]    Referral Start Date:  05/09/2025  Referral End Date:   05/09/2026             SCHEDULING  If you do not already have an appointment, please call [phone] to make an appointment.     MORE INFORMATION  If you do not already have a [employer] account, sign up at: [email or web address]  You can access your medical information, make appointments, see lab results, billing information, and more.  If you have questions regarding this referral, please contact  the [facility] Referrals department at:             [phone]. Monday - Friday 8:00AM - 5:00PM.     Sincerely,    [facility]

## 2025-06-16 ENCOUNTER — APPOINTMENT (OUTPATIENT)
Dept: MEDICAL GROUP | Facility: LAB | Age: 35
End: 2025-06-16
Payer: COMMERCIAL

## 2025-06-27 ENCOUNTER — OFFICE VISIT (OUTPATIENT)
Dept: MEDICAL GROUP | Facility: LAB | Age: 35
End: 2025-06-27
Payer: COMMERCIAL

## 2025-06-27 VITALS
HEART RATE: 63 BPM | OXYGEN SATURATION: 96 % | TEMPERATURE: 97.2 F | SYSTOLIC BLOOD PRESSURE: 124 MMHG | BODY MASS INDEX: 29.06 KG/M2 | HEIGHT: 69 IN | WEIGHT: 196.2 LBS | DIASTOLIC BLOOD PRESSURE: 68 MMHG | RESPIRATION RATE: 16 BRPM

## 2025-06-27 DIAGNOSIS — Z91.09 ENVIRONMENTAL ALLERGIES: Primary | ICD-10-CM

## 2025-06-27 DIAGNOSIS — K92.1 BLOODY STOOL: ICD-10-CM

## 2025-06-27 DIAGNOSIS — F32.A DEPRESSION, UNSPECIFIED DEPRESSION TYPE: ICD-10-CM

## 2025-06-27 RX ORDER — ALBUTEROL SULFATE 90 UG/1
2 INHALANT RESPIRATORY (INHALATION) EVERY 4 HOURS PRN
Qty: 1 EACH | Refills: 0 | Status: SHIPPED | OUTPATIENT
Start: 2025-06-27

## 2025-06-27 ASSESSMENT — PATIENT HEALTH QUESTIONNAIRE - PHQ9
5. POOR APPETITE OR OVEREATING: 1 - SEVERAL DAYS
SUM OF ALL RESPONSES TO PHQ QUESTIONS 1-9: 12
CLINICAL INTERPRETATION OF PHQ2 SCORE: 3

## 2025-06-27 ASSESSMENT — FIBROSIS 4 INDEX: FIB4 SCORE: 0.57

## 2025-06-27 NOTE — PROGRESS NOTES
Subjective:     CC: Follow-up Zoloft    HPI:   Eddie here today with   Verbal consent was acquired by the patient to use [employer] ambient listening note generation during this visit Yes     History of Present Illness  The patient presents for follow-up on increased Zoloft dose, hematochezia, allergies, and rib injury.    Increased Zoloft Dose  - Positive response to the increased Zoloft dosage, with no adverse effects.  - Counseling sessions are beneficial, with biweekly appointments scheduled.  - Passive suicidal ideation is expressed, but no active plan.  - Therapist is aware.    Hematochezia  - Bright red blood observed in stool.  - Awaiting gastroenterologist consultation.  - Dietary modifications include reduced sugar intake and increased water consumption.  - Energy drink and beer intake significantly reduced.    Allergies  - Severe allergies attributed to windy weather and desert weeds exposure.  - Requests prescription for inhaler    Supplemental information: None.    SOCIAL HISTORY  Significantly reduced beer consumption; one beer in the last 2 days. Two sodas and a couple of beers this week.         6/27/2025     8:00 AM 5/9/2025     8:00 AM 3/14/2025    11:20 AM 5/15/2020     4:20 PM 4/5/2019     4:40 PM   PHQ-9 Screening   Little interest or pleasure in doing things 2 - more than half the days 3 - nearly every day 2 - more than half the days 0 - not at all 0 - not at all   Feeling down, depressed, or hopeless 1 - several days 1 - several days 2 - more than half the days 0 - not at all 0 - not at all   Trouble falling or staying asleep, or sleeping too much 2 - more than half the days 2 - more than half the days 3 - nearly every day     Feeling tired or having little energy 2 - more than half the days 1 - several days 3 - nearly every day     Poor appetite or overeating 1 - several days 1 - several days 1 - several days     Feeling bad about yourself - or that you are a failure or have let yourself  "or your family down 1 - several days 1 - several days 0 - not at all     Trouble concentrating on things, such as reading the newspaper or watching television 1 - several days 1 - several days 0 - not at all     Moving or speaking so slowly that other people could have noticed. Or the opposite - being so fidgety or restless that you have been moving around a lot more than usual 1 - several days 1 - several days 0 - not at all     Thoughts that you would be better off dead, or of hurting yourself in some way 1 - several days 1 - several days 1 - several days     PHQ-2 Total Score 3 4 4 0 0   PHQ-9 Total Score 12 12 12         Interpretation of PHQ-9 Total Score   Score Severity   1-4 No Depression   5-9 Mild Depression   10-14 Moderate Depression   15-19 Moderately Severe Depression   20-27 Severe Depression        ROS:  All ROS negative except for pertinent positives listed above.       Current Medications and Prescriptions Ordered in Epic[1]    Objective:     Exam:  /68 (BP Location: Right arm, Patient Position: Sitting, BP Cuff Size: Adult)   Pulse 63   Temp 36.2 °C (97.2 °F) (Temporal)   Resp 16   Ht 1.753 m (5' 9.02\")   Wt 89 kg (196 lb 3.2 oz)   SpO2 96%   BMI 28.96 kg/m²  Body mass index is 28.96 kg/m².    Constitutional: Alert, no distress, well-groomed.  Skin: Warm, dry, good turgor, no rashes in visible areas.  Eye: Equal, round and reactive, conjunctiva clear, lids normal.  ENMT: Lips without lesions, good dentition, moist mucous membranes.  Neck: Trachea midline, no masses, no thyromegaly.  Respiratory: Unlabored respiratory effort, no cough.  MSK: Normal gait, moves all extremities.  Neuro: Grossly non-focal.   Psych: Alert and oriented x3, normal affect and mood.        Assessment & Plan:     35 y.o. male with the following -     1. Environmental allergies  albuterol 108 (90 Base) MCG/ACT Aero Soln inhalation aerosol      2. Depression, unspecified depression type  sertraline (ZOLOFT) 50 MG " Tab      3. Bloody stool              Assessment & Plan  Depression  No side effects from increased Zoloft dose. Counseling sessions beneficial; patient enjoys therapist. PHQ-9 positive for passive suicidal ideation; therapist aware.  Patient denies specific plan or intention  Treatment plan: Continue current Zoloft dose; refills sent to pharmacy.    Hematochezia  Continue over-the-counter iron supplementation.  Patient is due for updated colonoscopy and does plan to reach out to GI to get this scheduled    Allergies  Severe symptoms exacerbated by wind and outdoor activities.  Treatment plan: Prescription for inhaler provided.        I spent a total of 15 minutes with record review, exam, communication with the patient, communication with other providers, and documentation of this encounter.      No follow-ups on file.    Please note that this dictation was created using voice recognition software. I have made every reasonable attempt to correct obvious errors, but there may be errors of grammar and possibly content that I did not discover before finalizing the note.             [1]   Current Outpatient Medications Ordered in Epic   Medication Sig Dispense Refill    sertraline (ZOLOFT) 50 MG Tab Take 1 Tablet by mouth every day. 90 Tablet 1     No current Epic-ordered facility-administered medications on file.

## 2025-08-04 ENCOUNTER — TELEPHONE (OUTPATIENT)
Dept: HEALTH INFORMATION MANAGEMENT | Facility: OTHER | Age: 35
End: 2025-08-04
Payer: COMMERCIAL

## (undated) DEVICE — ERBE SIDE FIRE - (10/CA)

## (undated) DEVICE — BANDAGEESMARK  4X9' BLUE LF - 20/CS"

## (undated) DEVICE — CATHETER IV 20 GA X 1-1/4 ---SURG.& SDS ONLY--- (50EA/BX)

## (undated) DEVICE — BANDAGE ELASTIC 6 IN X 5 YDS - LATEX FREE (10/BX)

## (undated) DEVICE — GOWN SURGICAL XX-LARGE - (28EA/CA) SIRUS NON REINFORCED

## (undated) DEVICE — ADHESIVE MASTISOL - (48/BX)

## (undated) DEVICE — WATER IRRIGATION STERILE 1000ML (12EA/CA)

## (undated) DEVICE — BLADE SAGITTAL SAW 9.4MM X 25.5MM X .4MM FINE TOOTH (1/EA)

## (undated) DEVICE — LACTATED RINGERS INJ 1000 ML - (14EA/CA 60CA/PF)

## (undated) DEVICE — NEEDLE W/FACET TIP DULL VERSION W/STIMULATION CABLE SONOPLEX 21G X 4 (10/EA)"

## (undated) DEVICE — BAG SPONGE COUNT 10.25 X 32 - BLUE (250/CA)

## (undated) DEVICE — TUBE CONNECTING SUCTION - CLEAR PLASTIC STERILE 72 IN (50EA/CA)

## (undated) DEVICE — GLOVE, LITE (PAIR)

## (undated) DEVICE — PAD PREP 24 X 48 CUFFED - (100/CA)

## (undated) DEVICE — GOWN WARMING STANDARD FLEX - (30/CA)

## (undated) DEVICE — PACK LOWER EXTREMITY - (2/CA)

## (undated) DEVICE — SET LEADWIRE 5 LEAD BEDSIDE DISPOSABLE ECG (1SET OF 5/EA)

## (undated) DEVICE — CANISTER SUCTION RIGID RED 1500CC (40EA/CA)

## (undated) DEVICE — SODIUM CHL IRRIGATION 0.9% 1000ML (12EA/CA)

## (undated) DEVICE — HUMID-VENT HEAT AND MOISTURE EXCHANGE- (50/BX)

## (undated) DEVICE — CHLORAPREP 26 ML APPLICATOR - ORANGE TINT(25/CA)

## (undated) DEVICE — DRAPE C-ARM LARGE 41IN X 74 IN - (10/BX 2BX/CA)

## (undated) DEVICE — MASK ANESTHESIA ADULT  - (100/CA)

## (undated) DEVICE — PADDING CAST 6 IN STERILE - 6 X 4 YDS (24/CA)

## (undated) DEVICE — DRAPE LARGE 3 QUARTER - (20/CA)

## (undated) DEVICE — FILM CASSETTE ENDO

## (undated) DEVICE — SLEEVE, VASO, THIGH, MED

## (undated) DEVICE — SUTURE GENERAL

## (undated) DEVICE — HEAD HOLDER JUNIOR/ADULT

## (undated) DEVICE — SUCTION INSTRUMENT YANKAUER BULBOUS TIP W/O VENT (50EA/CA)

## (undated) DEVICE — STAPLER SKIN DISP - (6/BX 10BX/CA) VISISTAT

## (undated) DEVICE — GLOVE BIOGEL INDICATOR SZ 8 SURGICAL PF LTX - (50/BX 4BX/CA)

## (undated) DEVICE — ELECTRODE 850 FOAM ADHESIVE - HYDROGEL RADIOTRNSPRNT (50/PK)

## (undated) DEVICE — PROTECTOR ULNA NERVE - (36PR/CA)

## (undated) DEVICE — TUBING CLEARLINK DUO-VENT - C-FLO (48EA/CA)

## (undated) DEVICE — BANDAGE ELASTIC 4 IN X 5 YDS - LATEX FREE(10/BX 5BX/CA)

## (undated) DEVICE — GLOVE BIOGEL SZ 8 SURGICAL PF LTX - (50PR/BX 4BX/CA)

## (undated) DEVICE — NEPTUNE 4 PORT MANIFOLD - (20/PK)

## (undated) DEVICE — MASK PANORAMIC OXYGEN PRO2 (30EA/CA)

## (undated) DEVICE — SUTURE 3-0 VICRYL PLUS SH - 8X 18 INCH (12/BX)

## (undated) DEVICE — ELECTRODE DUAL RETURN W/ CORD - (50/PK)

## (undated) DEVICE — KIT  I.V. START (100EA/CA)

## (undated) DEVICE — TUBE E-T HI-LO CUFF 7.5MM (10EA/PK)

## (undated) DEVICE — SENSOR OXIMETER ADULT SPO2 RD SET (20EA/BX)

## (undated) DEVICE — BLADE SURGICAL #15 - (50/BX 3BX/CA)

## (undated) DEVICE — BURR OVAL CUT 4.0 MM

## (undated) DEVICE — STOCKINET BIAS 6 IN STERILE - (20/CA)

## (undated) DEVICE — BONE WAX (12PK/BX)

## (undated) DEVICE — TOWEL STOP TIMEOUT SAFETY FLAG (40EA/CA)

## (undated) DEVICE — KIT ANESTHESIA W/CIRCUIT & 3/LT BAG W/FILTER (20EA/CA)

## (undated) DEVICE — KIT BIO-TENDONESIS SUTURING

## (undated) DEVICE — KIT CUSTOM PROCEDURE SINGLE FOR ENDO  (15/CA)

## (undated) DEVICE — SPLINT PLASTER 5 IN X 30 IN - (50EA/BX 6BX/CA)

## (undated) DEVICE — BOVIE GRNDNG PAD FOR ARGON - 10/BX 4BX/CS

## (undated) DEVICE — PROBE CIRCUMFERENTIAL 2.3CM X 220CM (10EA/CA)

## (undated) DEVICE — SENSOR SPO2 NEO LNCS ADHESIVE (20/BX) SEE USER NOTES

## (undated) DEVICE — SUTURE 2-0 VICRYL PLUS SH - 8 X 18 INCH (12/BX)

## (undated) DEVICE — CLOSURE SKIN STRIP 1/2 X 4 IN - (STERI STRIP) (50/BX 4BX/CA)

## (undated) DEVICE — PADDING CAST 4 IN STERILE - 4 X 4 YDS (24/CA)

## (undated) DEVICE — SUTURE 3-0 ETHILON FS-1 - (36/BX) 30 INCH

## (undated) DEVICE — BLADE SURGICAL #10 - (50/BX)

## (undated) DEVICE — SUTURE 4-0 MONOCRYL PLUS PS-2 - 27 INCH (36/BX)

## (undated) DEVICE — SUTURE TAPE 1.3 MM (MUST ORDER 12 EA AT A TIME)